# Patient Record
Sex: MALE | Race: BLACK OR AFRICAN AMERICAN | Employment: OTHER | URBAN - METROPOLITAN AREA
[De-identification: names, ages, dates, MRNs, and addresses within clinical notes are randomized per-mention and may not be internally consistent; named-entity substitution may affect disease eponyms.]

---

## 2018-12-20 ENCOUNTER — HOSPITAL ENCOUNTER (OUTPATIENT)
Dept: MRI IMAGING | Age: 63
Discharge: HOME | End: 2018-12-20
Attending: PHYSICAL MEDICINE & REHABILITATION

## 2018-12-20 ENCOUNTER — HOSPITAL ENCOUNTER (OUTPATIENT)
Dept: MRI IMAGING | Age: 63
Discharge: HOME | End: 2018-12-20
Attending: PHYSICAL MEDICINE & REHABILITATION
Payer: MEDICARE

## 2018-12-20 DIAGNOSIS — M54.2 CERVICALGIA: ICD-10-CM

## 2018-12-20 DIAGNOSIS — R20.1 HYPOESTHESIA OF SKIN: ICD-10-CM

## 2018-12-20 DIAGNOSIS — M47.817 LUMBOSACRAL SPONDYLOSIS WITHOUT MYELOPATHY: ICD-10-CM

## 2018-12-20 DIAGNOSIS — M47.812 CERVICAL SPONDYLOSIS WITHOUT MYELOPATHY: ICD-10-CM

## 2018-12-20 DIAGNOSIS — M54.32 LEFT SIDED SCIATICA: ICD-10-CM

## 2018-12-20 PROCEDURE — 72148 MRI LUMBAR SPINE W/O DYE: CPT

## 2019-01-07 ENCOUNTER — HOSPITAL ENCOUNTER (OUTPATIENT)
Dept: MRI IMAGING | Age: 64
Discharge: HOME | End: 2019-01-07
Attending: PHYSICAL MEDICINE & REHABILITATION
Payer: MEDICARE

## 2019-01-07 PROCEDURE — 72141 MRI NECK SPINE W/O DYE: CPT

## 2019-03-16 ENCOUNTER — APPOINTMENT (OUTPATIENT)
Dept: CT IMAGING | Age: 64
End: 2019-03-16
Attending: NURSE PRACTITIONER
Payer: MEDICARE

## 2019-03-16 ENCOUNTER — APPOINTMENT (OUTPATIENT)
Dept: GENERAL RADIOLOGY | Age: 64
End: 2019-03-16
Attending: NURSE PRACTITIONER
Payer: MEDICARE

## 2019-03-16 ENCOUNTER — HOSPITAL ENCOUNTER (EMERGENCY)
Age: 64
Discharge: HOME | End: 2019-03-16
Attending: EMERGENCY MEDICINE
Payer: MEDICARE

## 2019-03-16 VITALS
DIASTOLIC BLOOD PRESSURE: 82 MMHG | HEIGHT: 74 IN | BODY MASS INDEX: 33.11 KG/M2 | WEIGHT: 258 LBS | SYSTOLIC BLOOD PRESSURE: 121 MMHG | RESPIRATION RATE: 18 BRPM | TEMPERATURE: 97.2 F | HEART RATE: 57 BPM
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
## 2019-03-16 DIAGNOSIS — S33.5XXA LUMBAR SPRAIN, INITIAL ENCOUNTER: ICD-10-CM

## 2019-03-16 DIAGNOSIS — S13.9XXA NECK SPRAIN, INITIAL ENCOUNTER: ICD-10-CM

## 2019-03-16 PROCEDURE — 70450 CT HEAD/BRAIN W/O DYE: CPT

## 2019-03-16 PROCEDURE — 99283 EMERGENCY DEPT VISIT LOW MDM: CPT

## 2019-03-16 PROCEDURE — 72050 X-RAY EXAM NECK SPINE 4/5VWS: CPT

## 2019-03-16 RX ORDER — METHOCARBAMOL 500 MG/1
500 TABLET, FILM COATED ORAL 4 TIMES DAILY
Qty: 30 TAB | Refills: 0 | OUTPATIENT
Start: 2019-03-16 | End: 2020-04-26

## 2019-03-16 RX ORDER — DEXTROMETHORPHAN HYDROBROMIDE, GUAIFENESIN 5; 100 MG/5ML; MG/5ML
650 LIQUID ORAL EVERY 8 HOURS
Qty: 20 TAB | Refills: 0 | Status: SHIPPED | OUTPATIENT
Start: 2019-03-16
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
## 2019-03-16 NOTE — DISCHARGE INSTRUCTIONS
Patient Education        Back Care and Preventing Injuries: Care Instructions  Your Care Instructions    You can hurt your back doing many everyday activities: lifting a heavy box, bending down to garden, exercising at the gym, and even getting out of bed. But you can keep your back strong and healthy by doing some exercises. You also can follow a few tips for sitting, sleeping, and lifting to avoid hurting your back again. Talk to your doctor before you start an exercise program. Ask for help if you want to learn more about keeping your back healthy. Follow-up care is a key part of your treatment and safety. Be sure to make and go to all appointments, and call your doctor if you are having problems. It's also a good idea to know your test results and keep a list of the medicines you take. How can you care for yourself at home? · Stay at a healthy weight to avoid strain on your lower back. · Do not smoke. Smoking increases the risk of osteoporosis, which weakens the spine. If you need help quitting, talk to your doctor about stop-smoking programs and medicines. These can increase your chances of quitting for good. · Make sure you sleep in a position that maintains your back's normal curves and on a mattress that feels comfortable. Sleep on your side with a pillow between your knees, or sleep on your back with a pillow under your knees. These positions can reduce strain on your back. · When you get out of bed, lie on your side and bend both knees. Drop your feet over the edge of the bed as you push up with both arms. Scoot to the edge of the bed. Make sure your feet are in line with your rear end (buttocks), and then stand up. · If you must stand for a long time, put one foot on a stool, ledge, or box. Exercise to strengthen your back and other muscles  · Get at least 30 minutes of exercise on most days of the week. Walking is a good choice.  You also may want to do other activities, such as running, swimming, cycling, or playing tennis or team sports. · Stretch your back muscles. Here are few exercises to try:  ? Lie on your back with your knees bent and your feet flat on the floor. Gently pull one bent knee to your chest. Put that foot back on the floor, and then pull the other knee to your chest. Hold for 15 to 30 seconds. Repeat 2 to 4 times. ? Do pelvic tilts. Lie on your back with your knees bent. Tighten your stomach muscles. Pull your belly button (navel) in and up toward your ribs. You should feel like your back is pressing to the floor and your hips and pelvis are slightly lifting off the floor. Hold for 6 seconds while breathing smoothly. · Keep your core muscles strong. The muscles of your back, belly (abdomen), and buttocks support your spine. ? Pull in your belly, and imagine pulling your navel toward your spine. Hold this for 6 seconds, then relax. Remember to keep breathing normally as you tense your muscles. ? Do curl-ups. Always do them with your knees bent. Keep your low back on the floor, and curl your shoulders toward your knees using a smooth, slow motion. Keep your arms folded across your chest. If this bothers your neck, try putting your hands behind your neck (not your head), with your elbows spread apart. ? Lie on your back with your knees bent and your feet flat on the floor. Tighten your belly muscles, and then push with your feet and raise your buttocks up a few inches. Hold this position 6 seconds as you continue to breathe normally, then lower yourself slowly to the floor. Repeat 8 to 12 times. ? If you like group exercise, try Pilates or yoga. These classes have poses that strengthen the core muscles. Protect your back when you sit  · Place a small pillow, a rolled-up towel, or a lumbar roll in the curve of your back if you need extra support. · Sit in a chair that is low enough to let you place both feet flat on the floor with both knees nearly level with your hips.  If your chair or desk is too high, use a foot rest to raise your knees. · When driving, keep your knees nearly level with your hips. Sit straight, and drive with both hands on the steering wheel. Your arms should be in a slightly bent position. · Try a kneeling chair, which helps tilt your hips forward. This takes pressure off your lower back. · Try sitting on an exercise ball. It can rock from side to side, which helps keep your back loose. Lift properly  · Squat down, bending at the hips and knees only. If you need to, put one knee to the floor and extend your other knee in front of you, bent at a right angle (half kneeling). · Press your chest straight forward. This helps keep your upper back straight while keeping a slight arch in your low back. · Hold the load as close to your body as possible, at the level of your navel. · Use your feet to change direction, taking small steps. · Lead with your hips as you change direction. Keep your shoulders in line with your hips as you move. Do not twist your body. · Set down your load carefully, squatting with your knees and hips only. When should you call for help? Watch closely for changes in your health, and be sure to contact your doctor if you have any problems. Where can you learn more? Go to http://aden-jhoana.info/. Enter S810 in the search box to learn more about \"Back Care and Preventing Injuries: Care Instructions. \"  Current as of: September 20, 2018  Content Version: 11.9  © 0906-1856 [employer]. Care instructions adapted under license by [employer] (which disclaims liability or warranty for this information). If you have questions about a medical condition or this instruction, always ask your healthcare professional. [place] [address] any warranty or liability for your use of this information.          Patient Education        Neck Strain: Care Instructions  Your Care Instructions    You have strained the muscles and ligaments in your neck. A sudden, awkward movement can strain the neck. This often occurs with falls or car accidents or during certain sports. Everyday activities like working on a computer or sleeping can also cause neck strain if they force you to hold your neck in an awkward position for a long time. It is common for neck pain to get worse for a day or two after an injury, but it should start to feel better after that. You may have more pain and stiffness for several days before it gets better. This is expected. It may take a few weeks or longer for it to heal completely. Good home treatment can help you get better faster and avoid future neck problems. Follow-up care is a key part of your treatment and safety. Be sure to make and go to all appointments, and call your doctor if you are having problems. It's also a good idea to know your test results and keep a list of the medicines you take. How can you care for yourself at home? · If you were given a neck brace (cervical collar) to limit neck motion, wear it as instructed for as many days as your doctor tells you to. Do not wear it longer than you were told to. Wearing a brace for too long can make neck stiffness worse and weaken the neck muscles. · You can try using heat or ice to see if it helps. ? Try using a heating pad on a low or medium setting for 15 to 20 minutes every 2 to 3 hours. Try a warm shower in place of one session with the heating pad. You can also buy single-use heat wraps that last up to 8 hours. ? You can also try an ice pack for 10 to 15 minutes every 2 to 3 hours. · Take pain medicines exactly as directed. ? If the doctor gave you a prescription medicine for pain, take it as prescribed. ? If you are not taking a prescription pain medicine, ask your doctor if you can take an over-the-counter medicine. · Gently rub the area to relieve pain and help with blood flow.  Do not massage the area if it hurts to do so. · Do not do anything that makes the pain worse. Take it easy for a couple of days. You can do your usual activities if they do not hurt your neck or put it at risk for more stress or injury. · Try sleeping on a special neck pillow. Place it under your neck, not under your head. Placing a tightly rolled-up towel under your neck while you sleep will also work. If you use a neck pillow or rolled towel, do not use your regular pillow at the same time. · To prevent future neck pain, do exercises to stretch and strengthen your neck and back. Learn how to use good posture, safe lifting techniques, and proper body mechanics. When should you call for help? Call 911 anytime you think you may need emergency care. For example, call if:    · You are unable to move an arm or a leg at all.   [facility] your doctor now or seek immediate medical care if:    · You have new or worse symptoms in your arms, legs, chest, belly, or buttocks. Symptoms may include:  ? Numbness or tingling. ? Weakness. ? Pain.     · You lose bladder or bowel control.    Watch closely for changes in your health, and be sure to contact your doctor if:    · You are not getting better as expected. Where can you learn more? Go to http://aden-jhoana.info/. Enter M253 in the search box to learn more about \"Neck Strain: Care Instructions. \"  Current as of: September 20, 2018  Content Version: 11.9  © 4610-8222 [employer]. Care instructions adapted under license by [employer] (which disclaims liability or warranty for this information). If you have questions about a medical condition or this instruction, always ask your healthcare professional. [place] [address] any warranty or liability for your use of this information.
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
## 2019-04-17 ENCOUNTER — OFFICE VISIT (OUTPATIENT)
Dept: NEUROLOGY | Age: 64
End: 2019-04-17

## 2019-04-17 VITALS
RESPIRATION RATE: 16 BRPM | BODY MASS INDEX: 33.24 KG/M2 | WEIGHT: 259 LBS | HEART RATE: 60 BPM | TEMPERATURE: 98.4 F | DIASTOLIC BLOOD PRESSURE: 84 MMHG | OXYGEN SATURATION: 97 % | HEIGHT: 74 IN | SYSTOLIC BLOOD PRESSURE: 125 MMHG

## 2019-04-17 DIAGNOSIS — G62.9 NEUROPATHY: ICD-10-CM

## 2019-04-17 DIAGNOSIS — M79.18 MYOFASCIAL MUSCLE PAIN: ICD-10-CM

## 2019-04-17 DIAGNOSIS — G44.311 INTRACTABLE ACUTE POST-TRAUMATIC HEADACHE: ICD-10-CM

## 2019-04-17 DIAGNOSIS — M54.2 NECK PAIN: ICD-10-CM

## 2019-04-17 DIAGNOSIS — F07.81 POST CONCUSSION SYNDROME: Primary | ICD-10-CM
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
## 2019-04-17 DIAGNOSIS — R53.83 OTHER FATIGUE: ICD-10-CM

## 2019-04-17 DIAGNOSIS — G56.01 CARPAL TUNNEL SYNDROME OF RIGHT WRIST: ICD-10-CM

## 2019-04-17 RX ORDER — ERGOCALCIFEROL 1.25 MG/1
50000 CAPSULE ORAL
COMMUNITY

## 2019-04-17 RX ORDER — GABAPENTIN 100 MG/1
100 CAPSULE ORAL 3 TIMES DAILY
Qty: 90 CAP | Refills: 1 | Status: SHIPPED | OUTPATIENT
Start: 2019-04-17 | End: 2019-06-14 | Stop reason: DRUGHIGH

## 2019-04-17 RX ORDER — HYDROCHLOROTHIAZIDE 12.5 MG/1
12.5 TABLET ORAL DAILY
COMMUNITY

## 2019-04-17 RX ORDER — PREDNISONE 10 MG/1
10 TABLET ORAL 2 TIMES DAILY
Qty: 20 TAB | Refills: 0 | Status: SHIPPED | OUTPATIENT
Start: 2019-04-17 | End: 2019-06-14 | Stop reason: SDUPTHER

## 2019-04-17 RX ORDER — ACETAMINOPHEN AND CODEINE PHOSPHATE 300; 30 MG/1; MG/1
TABLET ORAL
COMMUNITY
Start: 2019-03-20

## 2019-04-17 RX ORDER — AMLODIPINE BESYLATE 5 MG/1
TABLET ORAL DAILY
Refills: 3 | COMMUNITY
Start: 2019-04-12
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
## 2019-04-26 ENCOUNTER — HOSPITAL ENCOUNTER (OUTPATIENT)
Dept: CT IMAGING | Age: 64
Discharge: HOME | End: 2019-04-26
Attending: PSYCHIATRY & NEUROLOGY
Payer: MEDICARE

## 2019-04-26 DIAGNOSIS — M79.18 MYOFASCIAL MUSCLE PAIN: ICD-10-CM

## 2019-04-26 DIAGNOSIS — M54.2 NECK PAIN: ICD-10-CM

## 2019-04-26 PROCEDURE — 72125 CT NECK SPINE W/O DYE: CPT

## 2019-06-05 ENCOUNTER — HOSPITAL ENCOUNTER (OUTPATIENT)
Dept: PHYSICAL THERAPY | Age: 64
Discharge: HOME | End: 2019-06-05
Payer: MEDICARE

## 2019-06-05 PROCEDURE — 97161 PT EVAL LOW COMPLEX 20 MIN: CPT

## 2019-06-05 PROCEDURE — 97110 THERAPEUTIC EXERCISES: CPT
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
## 2019-06-10 ENCOUNTER — HOSPITAL ENCOUNTER (OUTPATIENT)
Dept: PHYSICAL THERAPY | Age: 64
Discharge: HOME | End: 2019-06-10
Payer: MEDICARE

## 2019-06-10 PROCEDURE — 97110 THERAPEUTIC EXERCISES: CPT

## 2019-06-10 NOTE — PROGRESS NOTES
[facility]  [address], [address] [facility]    OUTPATIENT physical Therapy DAILY Treatment NOTe  Visit: 2    NAME: Cady Guadarrama AGE: 75 y.o. GENDER: male  DATE: 6/10/2019  REFERRING PHYSICIAN: Ella Cintron MD        GOALS  Short term goals  Time frame: 4  1. Patient will be compliant and independent with the initial HEP as evidenced by being able to perform without cuing. 2. Patient will report a 40% improvement in symptoms. 3. Patient will have an increased tolerance for standing to allow 10 minutes of the activity before symptoms start. 6. Patient will tolerate 30 minutes of clinic activities before onset of symptoms. Long term goals  Time frame: 8  1. Patient will report pain level decrease to 3/10 to allow increased ease of movement. 2. Patient will have an improved score on the Asia Reyna outcome measure by 5 points to demonstrate an increase in functional activity tolerance. 3. Patient will be independent in final individualized HEP. 4. Patient will return to bike riding >5 miles without being limited by symptoms. SUBJECTIVE:   Neck Pain, feels stiff and with movement hurts 5/10  Cervical extension increases pain, at base of skull. Low back pain increases with sitting, walking 6/10, lying down doesn't really help  L>right  No report of pain in hips or LE's  Pain In:     OBJECTIVE DATA SUMMARY:     Pain:  Location: Lumbar spine L4-5, base of C spine to mid thoracic  Quality: aching and tingling  Now: 8/10  Best:  Worst:  Factors that improve pain: ice, TENs unit,    Posture:    Increased thoracic kyphosis    Strength:   Grossly WNL except left bicep 4/5  Patient demonstrated double knee lift in supine with LEs moving in a circular pattern and no change in pain reported    Range of Motion:   Cervical Spine (AROM)  Flexion  Chin to chest  Extension 32  R side bend   L side bend   R rotation 54  L rotation 42    Lumbar Spine (AROM)  (*Measured 3rd finger from the floor)  Flexion* Fingers to floor  Extension 50% available  R side bend* 62  L side bend* 58  R rotation 75% available  L rotation 74% available      Spinal Assessment:   Cervical PA glides WNL, patient indicated pain with Gr 1 glides  Increased thoracic kyphosis      Joint Mobility:   Cavitation with right lumbar rotation      Palpation:   TTP bilateral cervical paraspinals, right UT > left  Increased turgor right UT and periscap compared to left    Neurologic Assessment:   Tone: normal   Sensation: Patient indicated diminished right hand 1, 2nd, 3rd fingers   Reflexes: NT    Special Tests:   - SLR  - Spurling        Functional Measure:   NDI: 15/50  Gabo Marco Antonio: 16/24     Physical Therapy Evaluation Charge Determination   History Examination Presentation Decision-Making   MEDIUM  Complexity : 1-2 comorbidities / personal factors will impact the outcome/ POC  MEDIUM Complexity : 3 Standardized tests and measures addressing body structure, function, activity limitation and / or participation in recreation  LOW Complexity : Stable, uncomplicated  LOW Complexity : FOTO score of       Based on the above components, the patient evaluation is determined to be of the following complexity level: LOW     TREATMENT/INTERVENTION:  Modalities (Rationale): MHP low back and neck post treatment to decrease pain and muscle gurading      Therapeutic Exercises:  Initial HEP: LTR, SKTC, cervical retraction, lower cervical/upper thoracic stretch    Clinic Activities    Supine  Cervical ROM with towel support  Flex/ext  Rotation  Chin retraction with Cx support 7 reps with 10 sec hold  LTR x 10 reps  SKTC x 10 reps  PPT 7 reps with 10 sec hold      Seated  UT stretch 3 reps 20 sec hold B  Shoulder blade squeeze  Trunk SB stretch 10 reps B  Lower trunk rotation 10 reps, arms across chest  Trunk flexion with blue physioball 10 reps  HS stretch 3 reps b with 15 sec hold    UBE/LBE 5 min resistance of 4      Manual Therapy:  None today    Activity tolerance and post treatment pain report:   Good tolerance  Pain Out:     Education:  Education was provided to the patient on the following topics: Pt instructed in PPT and encouraged to hold pelvic tilt as he performs core exercises. .  [x]    No changes were made to the home exercise program.  []    The following changes were made to the home exercise program: none this date  Patient verbalized understanding of the topics presented. ASSESSMENT:   Pt returns following IE, he reports continued low back pain L>R and neck pain with occasional HA's. Advanced clinic activities as above with good tolerance. Pt performed some core exercises he does frequently, very aggressive with little low back support. Pt instructed in PPT and encouraged to use this position for core exercises. , and delay aggressive core exercises until pain free. Advance clinic activities with transition to  HEP and gym. Head forward and rounded shoulder posture noted,   Patients progression toward goals is as follows:  []     Improving appropriately and progressing toward goals  []     Improving slowly and progressing toward goals  []     Not making progress toward goals and plan of care will be adjusted    PLAN OF CARE:   Patient continues to benefit from skilled intervention to address the above impairments. [x]    Continue treatment per established plan of care. []     Recommend the following changes to the plan of care:     Recommendations/Intent for next treatment: IASTM Cx paraspinals, UT and levator. LE band exercises. Instruct in use of tennis ball for lumbar paraspinals and over PSIS.     Radha Solis PT   Time Calculation: 55 mins  Patient Time in clinic:   Start Time: 0930   Stop Time: 1025

## 2019-06-12 ENCOUNTER — HOSPITAL ENCOUNTER (OUTPATIENT)
Dept: PHYSICAL THERAPY | Age: 64
Discharge: HOME | End: 2019-06-12
Payer: MEDICARE

## 2019-06-12 NOTE — PROGRESS NOTES
[facility]  [address], [address] [facility]    OUTPATIENT physical Therapy      6/12/2019:  Luis Armando Santana was not seen on this date for physical therapy for the following reasons:    [x]     Patient called to cancel the visit for the following reasons: conflict  []     Patient missed the visit and did not call to cancel.     Ede Rothman, PT

## 2019-06-14 ENCOUNTER — OFFICE VISIT (OUTPATIENT)
Dept: NEUROLOGY | Age: 64
End: 2019-06-14

## 2019-06-14 VITALS
SYSTOLIC BLOOD PRESSURE: 142 MMHG | RESPIRATION RATE: 16 BRPM | BODY MASS INDEX: 35.12 KG/M2 | DIASTOLIC BLOOD PRESSURE: 88 MMHG | WEIGHT: 265 LBS | OXYGEN SATURATION: 98 % | TEMPERATURE: 98.3 F | HEART RATE: 80 BPM | HEIGHT: 73 IN

## 2019-06-14 DIAGNOSIS — F07.81 POSTCONCUSSION SYNDROME: Primary | ICD-10-CM

## 2019-06-14 DIAGNOSIS — G44.309 POST-TRAUMATIC HEADACHE, NOT INTRACTABLE, UNSPECIFIED CHRONICITY PATTERN: ICD-10-CM

## 2019-06-14 DIAGNOSIS — G62.9 NEUROPATHY: ICD-10-CM

## 2019-06-14 DIAGNOSIS — G44.311 INTRACTABLE ACUTE POST-TRAUMATIC HEADACHE: ICD-10-CM

## 2019-06-14 DIAGNOSIS — M54.12 CERVICAL RADICULOPATHY: ICD-10-CM

## 2019-06-14 DIAGNOSIS — E04.1 THYROID NODULE: ICD-10-CM

## 2019-06-14 DIAGNOSIS — M79.18 MYOFASCIAL MUSCLE PAIN: ICD-10-CM

## 2019-06-14 RX ORDER — TIZANIDINE 4 MG/1
4 TABLET ORAL
Qty: 30 TAB | Refills: 2 | Status: SHIPPED | OUTPATIENT
Start: 2019-06-14 | End: 2020-04-26 | Stop reason: CLARIF

## 2019-06-14 RX ORDER — PREDNISONE 10 MG/1
10 TABLET ORAL 2 TIMES DAILY
Qty: 20 TAB | Refills: 0 | Status: SHIPPED | OUTPATIENT
Start: 2019-06-14 | End: 2019-08-14 | Stop reason: SDUPTHER

## 2019-06-14 RX ORDER — GABAPENTIN 300 MG/1
300 CAPSULE ORAL 2 TIMES DAILY
Qty: 60 CAP | Refills: 3 | Status: SHIPPED | OUTPATIENT
Start: 2019-06-14 | End: 2021-04-14 | Stop reason: SDUPTHER
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
## 2019-06-20 ENCOUNTER — HOSPITAL ENCOUNTER (OUTPATIENT)
Dept: PHYSICAL THERAPY | Age: 64
Discharge: HOME | End: 2019-06-20
Payer: MEDICARE

## 2019-06-20 NOTE — PROGRESS NOTES
[facility]  [address], [address] [facility]    OUTPATIENT physical Therapy      6/20/2019:  Alberto Vaughn was not seen on this date for physical therapy for the following reasons:    [x]     Patient called to cancel the visit for the following reasons: awaiting insurance approval  []     Patient missed the visit and did not call to cancel.     Jorge Caldwell, PT

## 2019-07-03 NOTE — PROGRESS NOTES
[facility]  [address], [address] [facility]    OUTPATIENT physical Therapy discharge note      7/3/2019:  Patient will be discharged from physical therapy at this time. Criteria for termination of care:    []           Patient has plateaued  []           Patient has not returned to therapy  []           Patient has missed three or more visits without prior notification  [x]           Other: insurance coverage did not cover this PT clinic. Patient to set up appointment with [facility]. Patient was seen for 2 visits from 6/5/19 to 6/10/19. Please refer to the most recent progress note for the latest PT info available. If you need anything further faxed to you, please contact us at [phone].     Thank you for this referral.  Chrissie West, PT

## 2019-07-09 ENCOUNTER — HOSPITAL ENCOUNTER (OUTPATIENT)
Dept: ULTRASOUND IMAGING | Age: 64
Discharge: HOME | End: 2019-07-09
Attending: PSYCHIATRY & NEUROLOGY
Payer: MEDICARE

## 2019-07-09 DIAGNOSIS — E04.1 THYROID NODULE: ICD-10-CM

## 2019-07-09 PROCEDURE — 76536 US EXAM OF HEAD AND NECK: CPT

## 2019-07-10 ENCOUNTER — TELEPHONE (OUTPATIENT)
Dept: NEUROLOGY | Age: 64
End: 2019-07-10

## 2019-07-10 NOTE — TELEPHONE ENCOUNTER
Patient had an Ultrasound of the parathyroid on 7/9/19 inquiring if Dr. Celina Mullen received the results.

## 2019-07-18 ENCOUNTER — TELEPHONE (OUTPATIENT)
Dept: NEUROLOGY | Age: 64
End: 2019-07-18

## 2019-07-18 DIAGNOSIS — G44.301 INTRACTABLE POST-TRAUMATIC HEADACHE, UNSPECIFIED CHRONICITY PATTERN: ICD-10-CM

## 2019-07-18 DIAGNOSIS — F07.81 POSTCONCUSSION SYNDROME: ICD-10-CM

## 2019-07-18 DIAGNOSIS — F07.81 POSTTRAUMATIC BRAIN SYNDROME, NONPSYCHOTIC: Primary | ICD-10-CM

## 2019-07-18 NOTE — TELEPHONE ENCOUNTER
Tere sequeira from [employer] for therapist, Marcos No. She wants to know if she can get an order to treat him for concussion therapy.  Please fax to [phone]

## 2019-08-14 ENCOUNTER — HOSPITAL ENCOUNTER (OUTPATIENT)
Dept: GENERAL RADIOLOGY | Age: 64
Discharge: HOME | End: 2019-08-14
Payer: MEDICARE

## 2019-08-14 ENCOUNTER — OFFICE VISIT (OUTPATIENT)
Dept: NEUROLOGY | Age: 64
End: 2019-08-14

## 2019-08-14 VITALS
DIASTOLIC BLOOD PRESSURE: 85 MMHG | HEART RATE: 71 BPM | TEMPERATURE: 97.9 F | HEIGHT: 73 IN | OXYGEN SATURATION: 97 % | RESPIRATION RATE: 16 BRPM | WEIGHT: 266.4 LBS | SYSTOLIC BLOOD PRESSURE: 144 MMHG | BODY MASS INDEX: 35.31 KG/M2

## 2019-08-14 DIAGNOSIS — G31.84 MCI (MILD COGNITIVE IMPAIRMENT): ICD-10-CM

## 2019-08-14 DIAGNOSIS — R42 DIZZINESS: ICD-10-CM

## 2019-08-14 DIAGNOSIS — M54.12 CERVICAL RADICULOPATHY: ICD-10-CM

## 2019-08-14 DIAGNOSIS — E04.9 ENLARGEMENT OF THYROID: ICD-10-CM

## 2019-08-14 DIAGNOSIS — F07.81 POST CONCUSSION SYNDROME: Primary | ICD-10-CM

## 2019-08-14 DIAGNOSIS — G44.321 INTRACTABLE CHRONIC POST-TRAUMATIC HEADACHE: ICD-10-CM

## 2019-08-14 DIAGNOSIS — M79.18 MYOFASCIAL MUSCLE PAIN: ICD-10-CM

## 2019-08-14 DIAGNOSIS — M79.641 PAIN OF RIGHT HAND: ICD-10-CM

## 2019-08-14 DIAGNOSIS — G62.9 NEUROPATHY: ICD-10-CM

## 2019-08-14 PROBLEM — E66.01 SEVERE OBESITY (HCC): Status: ACTIVE | Noted: 2019-08-14

## 2019-08-14 PROCEDURE — 73130 X-RAY EXAM OF HAND: CPT

## 2019-08-14 RX ORDER — PREDNISONE 10 MG/1
10 TABLET ORAL 2 TIMES DAILY
Qty: 20 TAB | Refills: 0 | OUTPATIENT
Start: 2019-08-14 | End: 2022-02-20
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
## 2019-08-15 ENCOUNTER — TELEPHONE (OUTPATIENT)
Dept: NEUROLOGY | Age: 64
End: 2019-08-15

## 2019-08-15 DIAGNOSIS — G31.84 MCI (MILD COGNITIVE IMPAIRMENT): ICD-10-CM

## 2019-08-15 DIAGNOSIS — F07.81 POSTCONCUSSION SYNDROME: Primary | ICD-10-CM

## 2019-08-15 DIAGNOSIS — R42 DIZZINESS: ICD-10-CM

## 2019-08-26 ENCOUNTER — TELEPHONE (OUTPATIENT)
Dept: NEUROLOGY | Age: 64
End: 2019-08-26

## 2019-08-26 DIAGNOSIS — F41.1 GAD (GENERALIZED ANXIETY DISORDER): Primary | ICD-10-CM

## 2019-08-26 RX ORDER — DIAZEPAM 10 MG/1
TABLET ORAL
Qty: 2 TAB | Refills: 0 | Status: SHIPPED | OUTPATIENT
Start: 2019-08-26 | End: 2019-10-07 | Stop reason: SDUPTHER

## 2019-08-26 NOTE — TELEPHONE ENCOUNTER
Patient calling for (valium) for MRI. Patient refuses MRI without medication. Please advise.     Best # [phone]

## 2019-08-27 ENCOUNTER — TELEPHONE (OUTPATIENT)
Dept: NEUROLOGY | Age: 64
End: 2019-08-27

## 2019-08-29 ENCOUNTER — TELEPHONE (OUTPATIENT)
Dept: NEUROLOGY | Age: 64
End: 2019-08-29

## 2019-08-29 ENCOUNTER — HOSPITAL ENCOUNTER (OUTPATIENT)
Dept: MRI IMAGING | Age: 64
Discharge: HOME | End: 2019-08-29
Attending: PSYCHIATRY & NEUROLOGY

## 2019-08-29 DIAGNOSIS — F07.81 POSTCONCUSSION SYNDROME: ICD-10-CM

## 2019-08-29 DIAGNOSIS — R42 DIZZINESS: ICD-10-CM

## 2019-08-29 DIAGNOSIS — G31.84 MCI (MILD COGNITIVE IMPAIRMENT): ICD-10-CM

## 2019-08-29 NOTE — TELEPHONE ENCOUNTER
Patient went for MRI this morning and says he could not complete it. He was severely claustrophobic even after taking the medicine,  Needs to have reordered under IV sedation or with anesthesia, however they do.

## 2019-10-07 DIAGNOSIS — F41.1 GAD (GENERALIZED ANXIETY DISORDER): ICD-10-CM

## 2019-10-07 RX ORDER — DIAZEPAM 10 MG/1
TABLET ORAL
Qty: 2 TAB | Refills: 0 | Status: SHIPPED | OUTPATIENT
Start: 2019-10-07

## 2019-10-07 NOTE — TELEPHONE ENCOUNTER
Patient called asking to speak to Dr. Jesusita Faustin, I told him I would have to leave a message for his nurse. Did not say what it was regarding.

## 2019-10-09 ENCOUNTER — TELEPHONE (OUTPATIENT)
Dept: NEUROLOGY | Age: 64
End: 2019-10-09

## 2019-10-23 ENCOUNTER — TELEPHONE (OUTPATIENT)
Dept: NEUROLOGY | Age: 64
End: 2019-10-23

## 2019-10-23 ENCOUNTER — HOSPITAL ENCOUNTER (OUTPATIENT)
Dept: MRI IMAGING | Age: 64
Discharge: HOME | End: 2019-10-23
Attending: PSYCHIATRY & NEUROLOGY
Payer: MEDICARE

## 2019-10-23 DIAGNOSIS — G31.84 MCI (MILD COGNITIVE IMPAIRMENT): ICD-10-CM

## 2019-10-23 DIAGNOSIS — F07.81 POSTCONCUSSION SYNDROME: ICD-10-CM

## 2019-10-23 DIAGNOSIS — R42 DIZZINESS: ICD-10-CM

## 2019-10-23 PROCEDURE — 70551 MRI BRAIN STEM W/O DYE: CPT

## 2019-10-23 NOTE — TELEPHONE ENCOUNTER
Patient had MRI this yesterday and calling to make an appointment,  I told him I had nothing till January which he was not happy with. Wants an appointment sooner.

## 2019-10-28 ENCOUNTER — OFFICE VISIT (OUTPATIENT)
Dept: NEUROLOGY | Age: 64
End: 2019-10-28

## 2019-10-28 VITALS
WEIGHT: 269.2 LBS | BODY MASS INDEX: 35.68 KG/M2 | TEMPERATURE: 98.5 F | HEART RATE: 89 BPM | DIASTOLIC BLOOD PRESSURE: 80 MMHG | OXYGEN SATURATION: 96 % | SYSTOLIC BLOOD PRESSURE: 148 MMHG | HEIGHT: 73 IN | RESPIRATION RATE: 18 BRPM

## 2019-10-28 DIAGNOSIS — G44.311 INTRACTABLE ACUTE POST-TRAUMATIC HEADACHE: Primary | ICD-10-CM

## 2019-10-28 DIAGNOSIS — F07.81 POST CONCUSSION SYNDROME: ICD-10-CM

## 2019-10-28 DIAGNOSIS — M47.812 ARTHROPATHY OF CERVICAL FACET JOINT: ICD-10-CM

## 2019-10-28 DIAGNOSIS — M79.18 MYOFASCIAL MUSCLE PAIN: ICD-10-CM

## 2019-10-28 DIAGNOSIS — G62.9 NEUROPATHY: ICD-10-CM
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
## 2019-11-21 ENCOUNTER — TELEPHONE (OUTPATIENT)
Dept: NEUROLOGY | Age: 64
End: 2019-11-21

## 2019-11-21 NOTE — TELEPHONE ENCOUNTER
Patient states Dr Chacon Client wanted him to have some thyroid test and when he called dr Steve Villarreal office they told him if this was something you wanted then you would have  to order.   I told him this message would be waiting until you return as [place]

## 2019-12-12 ENCOUNTER — HOSPITAL ENCOUNTER (OUTPATIENT)
Dept: NUCLEAR MEDICINE | Age: 64
Discharge: HOME | End: 2019-12-12
Attending: INTERNAL MEDICINE
Payer: MEDICARE

## 2019-12-12 DIAGNOSIS — E04.1 THYROID NODULE: ICD-10-CM

## 2019-12-12 PROCEDURE — 78014 THYROID IMAGING W/BLOOD FLOW: CPT

## 2019-12-13 ENCOUNTER — HOSPITAL ENCOUNTER (OUTPATIENT)
Dept: NUCLEAR MEDICINE | Age: 64
Discharge: HOME | End: 2019-12-13
Attending: INTERNAL MEDICINE
Payer: MEDICARE

## 2020-04-26 ENCOUNTER — HOSPITAL ENCOUNTER (EMERGENCY)
Age: 65
Discharge: HOME | End: 2020-04-26
Attending: EMERGENCY MEDICINE
Payer: SELF-PAY

## 2020-04-26 ENCOUNTER — APPOINTMENT (OUTPATIENT)
Dept: GENERAL RADIOLOGY | Age: 65
End: 2020-04-26
Attending: EMERGENCY MEDICINE
Payer: SELF-PAY

## 2020-04-26 ENCOUNTER — APPOINTMENT (OUTPATIENT)
Dept: CT IMAGING | Age: 65
End: 2020-04-26
Attending: EMERGENCY MEDICINE
Payer: SELF-PAY

## 2020-04-26 VITALS
HEIGHT: 73 IN | BODY MASS INDEX: 32.37 KG/M2 | WEIGHT: 244.27 LBS | OXYGEN SATURATION: 96 % | DIASTOLIC BLOOD PRESSURE: 96 MMHG | HEART RATE: 68 BPM | RESPIRATION RATE: 18 BRPM | SYSTOLIC BLOOD PRESSURE: 165 MMHG | TEMPERATURE: 98.5 F

## 2020-04-26 DIAGNOSIS — E87.6 HYPOKALEMIA: ICD-10-CM

## 2020-04-26 DIAGNOSIS — K57.90 DIVERTICULOSIS: ICD-10-CM

## 2020-04-26 DIAGNOSIS — R10.12 ABDOMINAL PAIN, LUQ (LEFT UPPER QUADRANT): Primary | ICD-10-CM

## 2020-04-26 DIAGNOSIS — K29.00 OTHER ACUTE GASTRITIS WITHOUT HEMORRHAGE: ICD-10-CM

## 2020-04-26 DIAGNOSIS — R07.9 ACUTE CHEST PAIN: ICD-10-CM

## 2020-04-26 LAB
ALBUMIN SERPL-MCNC: 4 G/DL (ref 3.5–5)
ALBUMIN/GLOB SERPL: 1 {RATIO} (ref 1.1–2.2)
ALP SERPL-CCNC: 84 U/L (ref 45–117)
ALT SERPL-CCNC: 41 U/L (ref 12–78)
ANION GAP SERPL CALC-SCNC: 8 MMOL/L (ref 5–15)
APPEARANCE UR: CLEAR
AST SERPL-CCNC: 24 U/L (ref 15–37)
ATRIAL RATE: 68 BPM
BACTERIA URNS QL MICRO: ABNORMAL /HPF
BASOPHILS # BLD: 0.1 K/UL (ref 0–0.1)
BASOPHILS NFR BLD: 1 % (ref 0–1)
BILIRUB SERPL-MCNC: 1.3 MG/DL (ref 0.2–1)
BILIRUB UR QL: NEGATIVE
BUN SERPL-MCNC: 15 MG/DL (ref 6–20)
BUN/CREAT SERPL: 10 (ref 12–20)
CALCIUM SERPL-MCNC: 9.8 MG/DL (ref 8.5–10.1)
CALCULATED P AXIS, ECG09: 52 DEGREES
CALCULATED R AXIS, ECG10: -71 DEGREES
CALCULATED T AXIS, ECG11: 23 DEGREES
CHLORIDE SERPL-SCNC: 100 MMOL/L (ref 97–108)
CO2 SERPL-SCNC: 30 MMOL/L (ref 21–32)
COLOR UR: ABNORMAL
CREAT SERPL-MCNC: 1.55 MG/DL (ref 0.7–1.3)
DIAGNOSIS, 93000: NORMAL
DIFFERENTIAL METHOD BLD: ABNORMAL
EOSINOPHIL # BLD: 0 K/UL (ref 0–0.4)
EOSINOPHIL NFR BLD: 0 % (ref 0–7)
EPITH CASTS URNS QL MICRO: ABNORMAL /LPF
ERYTHROCYTE [DISTWIDTH] IN BLOOD BY AUTOMATED COUNT: 12.7 % (ref 11.5–14.5)
GLOBULIN SER CALC-MCNC: 4.2 G/DL (ref 2–4)
GLUCOSE SERPL-MCNC: 156 MG/DL (ref 65–100)
GLUCOSE UR STRIP.AUTO-MCNC: NEGATIVE MG/DL
HCT VFR BLD AUTO: 47.8 % (ref 36.6–50.3)
HGB BLD-MCNC: 17.1 G/DL (ref 12.1–17)
HGB UR QL STRIP: NEGATIVE
HYALINE CASTS URNS QL MICRO: ABNORMAL /LPF (ref 0–5)
IMM GRANULOCYTES # BLD AUTO: 0 K/UL (ref 0–0.04)
IMM GRANULOCYTES NFR BLD AUTO: 0 % (ref 0–0.5)
KETONES UR QL STRIP.AUTO: NEGATIVE MG/DL
LACTATE SERPL-SCNC: 2.3 MMOL/L (ref 0.4–2)
LEUKOCYTE ESTERASE UR QL STRIP.AUTO: NEGATIVE
LIPASE SERPL-CCNC: 61 U/L (ref 73–393)
LYMPHOCYTES # BLD: 0.8 K/UL (ref 0.8–3.5)
LYMPHOCYTES NFR BLD: 6 % (ref 12–49)
MAGNESIUM SERPL-MCNC: 2.1 MG/DL (ref 1.6–2.4)
MCH RBC QN AUTO: 30.8 PG (ref 26–34)
MCHC RBC AUTO-ENTMCNC: 35.8 G/DL (ref 30–36.5)
MCV RBC AUTO: 86 FL (ref 80–99)
MONOCYTES # BLD: 0.6 K/UL (ref 0–1)
MONOCYTES NFR BLD: 5 % (ref 5–13)
NEUTS SEG # BLD: 11.1 K/UL (ref 1.8–8)
NEUTS SEG NFR BLD: 88 % (ref 32–75)
NITRITE UR QL STRIP.AUTO: NEGATIVE
NRBC # BLD: 0 K/UL (ref 0–0.01)
NRBC BLD-RTO: 0 PER 100 WBC
P-R INTERVAL, ECG05: 142 MS
PH UR STRIP: 8 [PH] (ref 5–8)
PLATELET # BLD AUTO: 314 K/UL (ref 150–400)
PMV BLD AUTO: 9.9 FL (ref 8.9–12.9)
POTASSIUM SERPL-SCNC: 2.7 MMOL/L (ref 3.5–5.1)
PROT SERPL-MCNC: 8.2 G/DL (ref 6.4–8.2)
PROT UR STRIP-MCNC: ABNORMAL MG/DL
Q-T INTERVAL, ECG07: 428 MS
QRS DURATION, ECG06: 98 MS
QTC CALCULATION (BEZET), ECG08: 455 MS
RBC # BLD AUTO: 5.56 M/UL (ref 4.1–5.7)
RBC #/AREA URNS HPF: ABNORMAL /HPF (ref 0–5)
RBC MORPH BLD: ABNORMAL
SODIUM SERPL-SCNC: 138 MMOL/L (ref 136–145)
SP GR UR REFRACTOMETRY: <1.005 (ref 1–1.03)
TROPONIN I SERPL-MCNC: <0.05 NG/ML
UROBILINOGEN UR QL STRIP.AUTO: 1 EU/DL (ref 0.2–1)
VENTRICULAR RATE, ECG03: 68 BPM
WBC # BLD AUTO: 12.6 K/UL (ref 4.1–11.1)
WBC URNS QL MICRO: ABNORMAL /HPF (ref 0–4)

## 2020-04-26 PROCEDURE — 84484 ASSAY OF TROPONIN QUANT: CPT

## 2020-04-26 PROCEDURE — 83605 ASSAY OF LACTIC ACID: CPT

## 2020-04-26 PROCEDURE — 85025 COMPLETE CBC W/AUTO DIFF WBC: CPT

## 2020-04-26 PROCEDURE — 93005 ELECTROCARDIOGRAM TRACING: CPT

## 2020-04-26 PROCEDURE — 96365 THER/PROPH/DIAG IV INF INIT: CPT

## 2020-04-26 PROCEDURE — 80053 COMPREHEN METABOLIC PANEL: CPT

## 2020-04-26 PROCEDURE — 74177 CT ABD & PELVIS W/CONTRAST: CPT

## 2020-04-26 PROCEDURE — 74011000250 HC RX REV CODE- 250: Performed by: EMERGENCY MEDICINE

## 2020-04-26 PROCEDURE — 71045 X-RAY EXAM CHEST 1 VIEW: CPT

## 2020-04-26 PROCEDURE — 83690 ASSAY OF LIPASE: CPT

## 2020-04-26 PROCEDURE — 74011250637 HC RX REV CODE- 250/637: Performed by: EMERGENCY MEDICINE

## 2020-04-26 PROCEDURE — 96375 TX/PRO/DX INJ NEW DRUG ADDON: CPT

## 2020-04-26 PROCEDURE — 96366 THER/PROPH/DIAG IV INF ADDON: CPT

## 2020-04-26 PROCEDURE — 74011250636 HC RX REV CODE- 250/636: Performed by: EMERGENCY MEDICINE

## 2020-04-26 PROCEDURE — 83735 ASSAY OF MAGNESIUM: CPT

## 2020-04-26 PROCEDURE — 99284 EMERGENCY DEPT VISIT MOD MDM: CPT

## 2020-04-26 PROCEDURE — 36415 COLL VENOUS BLD VENIPUNCTURE: CPT

## 2020-04-26 PROCEDURE — 74011636320 HC RX REV CODE- 636/320: Performed by: EMERGENCY MEDICINE

## 2020-04-26 PROCEDURE — 81001 URINALYSIS AUTO W/SCOPE: CPT

## 2020-04-26 RX ORDER — DICYCLOMINE HYDROCHLORIDE 10 MG/1
10 CAPSULE ORAL
Qty: 20 CAP | Refills: 0 | Status: SHIPPED | OUTPATIENT
Start: 2020-04-26 | End: 2020-05-01

## 2020-04-26 RX ORDER — METHOCARBAMOL 750 MG/1
750 TABLET, FILM COATED ORAL
Status: COMPLETED | OUTPATIENT
Start: 2020-04-26 | End: 2020-04-26

## 2020-04-26 RX ORDER — SODIUM CHLORIDE 0.9 % (FLUSH) 0.9 %
10 SYRINGE (ML) INJECTION
Status: COMPLETED | OUTPATIENT
Start: 2020-04-26 | End: 2020-04-26

## 2020-04-26 RX ORDER — ONDANSETRON 2 MG/ML
4 INJECTION INTRAMUSCULAR; INTRAVENOUS
Status: COMPLETED | OUTPATIENT
Start: 2020-04-26 | End: 2020-04-26

## 2020-04-26 RX ORDER — FAMOTIDINE 20 MG/1
20 TABLET, FILM COATED ORAL 2 TIMES DAILY
Qty: 20 TAB | Refills: 0 | OUTPATIENT
Start: 2020-04-26 | End: 2022-02-20

## 2020-04-26 RX ORDER — POTASSIUM CHLORIDE 7.45 MG/ML
10 INJECTION INTRAVENOUS
Status: COMPLETED | OUTPATIENT
Start: 2020-04-26 | End: 2020-04-26

## 2020-04-26 RX ORDER — METHOCARBAMOL 750 MG/1
750 TABLET, FILM COATED ORAL
Qty: 20 TAB | Refills: 0 | Status: SHIPPED | OUTPATIENT
Start: 2020-04-26 | End: 2021-04-14 | Stop reason: SDUPTHER

## 2020-04-26 RX ADMIN — ONDANSETRON 4 MG: 2 INJECTION INTRAMUSCULAR; INTRAVENOUS at 03:51

## 2020-04-26 RX ADMIN — Medication 10 ML: at 03:43

## 2020-04-26 RX ADMIN — LIDOCAINE HYDROCHLORIDE 40 ML: 20 SOLUTION ORAL; TOPICAL at 06:32

## 2020-04-26 RX ADMIN — POTASSIUM CHLORIDE 10 MEQ: 10 INJECTION, SOLUTION INTRAVENOUS at 05:27

## 2020-04-26 RX ADMIN — SODIUM CHLORIDE 1000 ML: 900 INJECTION, SOLUTION INTRAVENOUS at 05:27

## 2020-04-26 RX ADMIN — METHOCARBAMOL TABLETS 750 MG: 750 TABLET, COATED ORAL at 07:00

## 2020-04-26 RX ADMIN — IOPAMIDOL 100 ML: 755 INJECTION, SOLUTION INTRAVENOUS at 03:43

## 2020-04-26 RX ADMIN — FAMOTIDINE 20 MG: 10 INJECTION, SOLUTION INTRAVENOUS at 03:52

## 2020-04-26 NOTE — ED NOTES
Patient discharged by Micah Felty, MD. Patient provided with discharge instructions Rx and instructions on follow up care. Patient out of ED ambulatory accompanied by self.

## 2020-04-26 NOTE — ED TRIAGE NOTES
6354: Patient arrives to ED with complaints of LLQ pain since yesterday around 10:00 AM. Patient reports that he was using a push mower on Friday and believes that this pain may somehow be related. Also reports episodes of nausea/vomiting with his onset of pain. Patient arrives a/o x4. Placed on the monitor x2.

## 2020-04-26 NOTE — DISCHARGE INSTRUCTIONS
Patient Education        Abdominal Pain: Care Instructions  Your Care Instructions    Abdominal pain has many possible causes. Some aren't serious and get better on their own in a few days. Others need more testing and treatment. If your pain continues or gets worse, you need to be rechecked and may need more tests to find out what is wrong. You may need surgery to correct the problem. Don't ignore new symptoms, such as fever, nausea and vomiting, urination problems, pain that gets worse, and dizziness. These may be signs of a more serious problem. Your doctor may have recommended a follow-up visit in the next 8 to 12 hours. If you are not getting better, you may need more tests or treatment. The doctor has checked you carefully, but problems can develop later. If you notice any problems or new symptoms, get medical treatment right away. Follow-up care is a key part of your treatment and safety. Be sure to make and go to all appointments, and call your doctor if you are having problems. It's also a good idea to know your test results and keep a list of the medicines you take. How can you care for yourself at home? · Rest until you feel better. · To prevent dehydration, drink plenty of fluids, enough so that your urine is light yellow or clear like water. Choose water and other caffeine-free clear liquids until you feel better. If you have kidney, heart, or liver disease and have to limit fluids, talk with your doctor before you increase the amount of fluids you drink. · If your stomach is upset, eat mild foods, such as rice, dry toast or crackers, bananas, and applesauce. Try eating several small meals instead of two or three large ones. · Wait until 48 hours after all symptoms have gone away before you have spicy foods, alcohol, and drinks that contain caffeine. · Do not eat foods that are high in fat. · Avoid anti-inflammatory medicines such as aspirin, ibuprofen (Advil, Motrin), and naproxen (Aleve). These can cause stomach upset. Talk to your doctor if you take daily aspirin for another health problem. When should you call for help? Call 911 anytime you think you may need emergency care. For example, call if:    · You passed out (lost consciousness).     · You pass maroon or very bloody stools.     · You vomit blood or what looks like coffee grounds.     · You have new, severe belly pain.    Call your doctor now or seek immediate medical care if:    · Your pain gets worse, especially if it becomes focused in one area of your belly.     · You have a new or higher fever.     · Your stools are black and look like tar, or they have streaks of blood.     · You have unexpected vaginal bleeding.     · You have symptoms of a urinary tract infection. These may include:  ? Pain when you urinate. ? Urinating more often than usual.  ? Blood in your urine.     · You are dizzy or lightheaded, or you feel like you may faint.    Watch closely for changes in your health, and be sure to contact your doctor if:    · You are not getting better after 1 day (24 hours). Where can you learn more? Go to http://adenShopIgniterjhoana.info/  Enter Y439 in the search box to learn more about \"Abdominal Pain: Care Instructions. \"  Current as of: June 26, 2019Content Version: 12.4  © 1986-0498 [employer]. Care instructions adapted under license by [employer] (which disclaims liability or warranty for this information). If you have questions about a medical condition or this instruction, always ask your healthcare professional. [place] [address] any warranty or liability for your use of this information. Patient Education        Chest Pain: Care Instructions  Your Care Instructions    There are many things that can cause chest pain. Some are not serious and will get better on their own in a few days. But some kinds of chest pain need more testing and treatment.  Your doctor may have recommended a follow-up visit in the next 8 to 12 hours. If you are not getting better, you may need more tests or treatment. Even though your doctor has released you, you still need to watch for any problems. The doctor carefully checked you, but sometimes problems can develop later. If you have new symptoms or if your symptoms do not get better, get medical care right away. If you have worse or different chest pain or pressure that lasts more than 5 minutes or you passed out (lost consciousness), call 911 or seek other emergency help right away. A medical visit is only one step in your treatment. Even if you feel better, you still need to do what your doctor recommends, such as going to all suggested follow-up appointments and taking medicines exactly as directed. This will help you recover and help prevent future problems. How can you care for yourself at home? · Rest until you feel better. · Take your medicine exactly as prescribed. Call your doctor if you think you are having a problem with your medicine. · Do not drive after taking a prescription pain medicine. When should you call for help? Call 911 if:    · You passed out (lost consciousness).     · You have severe difficulty breathing.     · You have symptoms of a heart attack. These may include:  ? Chest pain or pressure, or a strange feeling in your chest.  ? Sweating. ? Shortness of breath. ? Nausea or vomiting. ? Pain, pressure, or a strange feeling in your back, neck, jaw, or upper belly or in one or both shoulders or arms. ? Lightheadedness or sudden weakness. ? A fast or irregular heartbeat. After you call  911, the  may tell you to chew 1 adult-strength or 2 to 4 low-dose aspirin. Wait for an ambulance.  Do not try to drive yourself.    Call your doctor today if:    · You have any trouble breathing.     · Your chest pain gets worse.     · You are dizzy or lightheaded, or you feel like you may faint.     · You are not getting better as expected.     · You are having new or different chest pain. Where can you learn more? Go to http://aden-jhoana.info/  Enter A120 in the search box to learn more about \"Chest Pain: Care Instructions. \"  Current as of: June 26, 2019Content Version: 12.4  © 4665-2370 [employer]. Care instructions adapted under license by [employer] (which disclaims liability or warranty for this information). If you have questions about a medical condition or this instruction, always ask your healthcare professional. [address] any warranty or liability for your use of this information. Patient Education        Diverticulosis: Care Instructions  Your Care Instructions  In diverticulosis, pouches called diverticula form in the wall of the large intestine (colon). The pouches do not cause any pain or other symptoms. Most people who have diverticulosis do not know they have it. But the pouches sometimes bleed, and if they become infected, they can cause pain and other symptoms. When this happens, it is called diverticulitis. Diverticula form when pressure pushes the wall of the colon outward at certain weak points. A diet that is too low in fiber can cause diverticula. Follow-up care is a key part of your treatment and safety. Be sure to make and go to all appointments, and call your doctor if you are having problems. It's also a good idea to know your test results and keep a list of the medicines you take. How can you care for yourself at home? · Include fruits, leafy green vegetables, beans, and whole grains in your diet each day. These foods are high in fiber. · Take a fiber supplement, such as Citrucel or Metamucil, every day if needed. Read and follow all instructions on the label. · Drink plenty of fluids, enough so that your urine is light yellow or clear like water.  If you have kidney, heart, or liver disease and have to limit fluids, talk with your doctor before you increase the amount of fluids you drink. · Get at least 30 minutes of exercise on most days of the week. Walking is a good choice. You also may want to do other activities, such as running, swimming, cycling, or playing tennis or team sports. · Cut out foods that cause gas, pain, or other symptoms. When should you call for help? Call your doctor now or seek immediate medical care if:    · You have belly pain.     · You pass maroon or very bloody stools.     · You have a fever.     · You have nausea and vomiting.     · You have unusual changes in your bowel movements or abdominal swelling.     · You have burning pain when you urinate.     · You have abnormal vaginal discharge.     · You have shoulder pain.     · You have cramping pain that does not get better when you have a bowel movement or pass gas.     · You pass gas or stool from your urethra while urinating.    Watch closely for changes in your health, and be sure to contact your doctor if you have any problems. Where can you learn more? Go to http://aden-jhoana.info/  Enter [id number] in the search box to learn more about \"Diverticulosis: Care Instructions. \"  Current as of: August 11, 2019Content Version: 12.4  © 3280-5481 Healthwise, Incorporated. Care instructions adapted under license by [employer] (which disclaims liability or warranty for this information). If you have questions about a medical condition or this instruction, always ask your healthcare professional. [place] [address] any warranty or liability for your use of this information. Patient Education        Gastritis: Care Instructions  Your Care Instructions    Gastritis is a sore and upset stomach. It happens when something irritates the stomach lining. Many things can cause it. These include an infection such as the flu or something you ate or drank.  Medicines or a sore on the lining of the stomach (ulcer) also can cause it. Your belly may bloat and ache. You may belch, vomit, and feel sick to your stomach. You should be able to relieve the problem by taking medicine. And it may help to change your diet. If gastritis lasts, your doctor may prescribe medicine. Follow-up care is a key part of your treatment and safety. Be sure to make and go to all appointments, and call your doctor if you are having problems. It's also a good idea to know your test results and keep a list of the medicines you take. How can you care for yourself at home? · If your doctor prescribed antibiotics, take them as directed. Do not stop taking them just because you feel better. You need to take the full course of antibiotics. · Be safe with medicines. If your doctor prescribed medicine to decrease stomach acid, take it as directed. Call your doctor if you think you are having a problem with your medicine. · Do not take any other medicine, including over-the-counter pain relievers, without talking to your doctor first.  · If your doctor recommends over-the-counter medicine to reduce stomach acid, such as Pepcid AC, Prilosec, Tagamet HB, or Zantac 75, follow the directions on the label. · Drink plenty of fluids (enough so that your urine is light yellow or clear like water) to prevent dehydration. Choose water and other caffeine-free clear liquids. If you have kidney, heart, or liver disease and have to limit fluids, talk with your doctor before you increase the amount of fluids you drink. · Limit how much alcohol you drink. · Avoid coffee, tea, cola drinks, chocolate, and other foods with caffeine. They increase stomach acid. When should you call for help? Call 911 anytime you think you may need emergency care.  For example, call if:    · You vomit blood or what looks like coffee grounds.     · You pass maroon or very bloody stools.    Call your doctor now or seek immediate medical care if:    · You start breathing fast and have not produced urine in the last 8 hours.     · You cannot keep fluids down.    Watch closely for changes in your health, and be sure to contact your doctor if:    · You do not get better as expected. Where can you learn more? Go to http://aden-jhoana.info/  Enter Z536 in the search box to learn more about \"Gastritis: Care Instructions. \"  Current as of: August 11, 2019Content Version: 12.4  © 2238-1335 [employer]. Care instructions adapted under license by [employer] (which disclaims liability or warranty for this information). If you have questions about a medical condition or this instruction, always ask your healthcare professional. [address] any warranty or liability for your use of this information. Patient Education        Hypokalemia: Care Instructions  Your Care Instructions    Hypokalemia (say \"xq-[id number]-pas-ROCIO-janes-uh\") is a low level of potassium. The heart, muscles, kidneys, and nervous system all need potassium to work well. This problem has many different causes. Kidney problems, diet, and medicines like diuretics and laxatives can cause it. So can vomiting or diarrhea. In some cases, cancer is the cause. Your doctor may do tests to find the cause of your low potassium levels. You may need medicines to bring your potassium levels back to normal. You may also need regular blood tests to check your potassium. If you have very low potassium, you may need intravenous (IV) medicines. You also may need tests to check the electrical activity of your heart. Heart problems caused by low potassium levels can be very serious. Follow-up care is a key part of your treatment and safety. Be sure to make and go to all appointments, and call your doctor if you are having problems. It's also a good idea to know your test results and keep a list of the medicines you take. How can you care for yourself at home?   · If your doctor recommends it, eat foods that have a lot of potassium. These include fresh fruits, juices, and vegetables. They also include nuts, beans, and milk. · Be safe with medicines. If your doctor prescribes medicines or potassium supplements, take them exactly as directed. Call your doctor if you have any problems with your medicines. · Get your potassium levels tested as often as your doctor tells you. When should you call for help?    · You feel like your heart is missing beats. Heart problems caused by low potassium can cause death.     · You passed out (lost consciousness).     · You have a seizure.    Call your doctor now or seek immediate medical care if:    · You feel weak or unusually tired.     · You have severe arm or leg cramps.     · You have tingling or numbness.     · You feel sick to your stomach, or you vomit.     · You have belly cramps.     · You feel bloated or constipated.     · You have to urinate a lot.     · You feel very thirsty most of the time.     · You are dizzy or lightheaded, or you feel like you may faint.     · You feel depressed, or you lose touch with reality.    Watch closely for changes in your health, and be sure to contact your doctor if:    · You do not get better as expected. Where can you learn more? Go to http://aden-jhoana.info/  Enter G358 in the search box to learn more about \"Hypokalemia: Care Instructions. \"  Current as of: July 28, 2019Content Version: 12.4  © 8820-9519 Healthwise, Incorporated. Care instructions adapted under license by [employer] (which disclaims liability or warranty for this information). If you have questions about a medical condition or this instruction, always ask your healthcare professional. [address] any warranty or liability for your use of this information.

## 2020-04-26 NOTE — ED PROVIDER NOTES
EMERGENCY DEPARTMENT HISTORY AND PHYSICAL EXAM      Date: 4/26/2020  Patient Name: Zayda Spicer    History of Presenting Illness     Chief Complaint   Patient presents with    Abdominal Pain       History Provided By: Patient and EMS    HPI: Zayda Spicer, 77 y.o. male presents to the ED with cc of abdominal pain and chest pain. Patient's symptoms began yesterday at 10 AM.  He believes they were precipitated by using a lawnmower on the previous day and also eating chicken which had been in the freezer for 2 years. He states that his pain is intermittent and lasts for hours at a time. It is located primarily in the left upper quadrant. He denies any pain involving the back. He has had 6 episodes of emesis, where he states he produced bile. Denies diarrhea, but does state he has not had a bowel movement in over a day. He also denies dysuria, fever, cough or shortness of breath. He indicates that he has had heartburn off and on for the last 6 hours. That is located in the left chest and does not radiate. The pains are a 4 out of 10 in intensity. He denies leg pain, headache or dizziness. There are no other complaints, changes, or physical findings at this time. Smoking cessation note: The patient was encouraged to quit smoking. PCP: Dayana Chamorro MD    No current facility-administered medications on file prior to encounter. Current Outpatient Medications on File Prior to Encounter   Medication Sig Dispense Refill    gabapentin (NEURONTIN) 300 mg capsule Take 1 Cap by mouth two (2) times a day. 60 Cap 3    amLODIPine (NORVASC) 5 mg tablet daily. 3    hydroCHLOROthiazide (HYDRODIURIL) 12.5 mg tablet Take 12.5 mg by mouth daily.  diazePAM (VALIUM) 10 mg tablet Take 1 tablet 1 hour before the MRI procedure. 2 Tab 0    predniSONE (DELTASONE) 10 mg tablet Take 10 mg by mouth two (2) times a day. 20 Tab 0    acetaminophen-codeine (TYLENOL #3) 300-30 mg per tablet nightly.       ergocalciferol (VITAMIN D2) 50,000 unit capsule Take 50,000 Units by mouth every thirty (30) days.  methocarbamol (ROBAXIN) 500 mg tablet Take 1 Tab by mouth four (4) times daily. 30 Tab 0    acetaminophen (TYLENOL ARTHRITIS PAIN) 650 mg TbER Take 1 Tab by mouth every eight (8) hours. 20 Tab 0       Past History     Past Medical History:  Past Medical History:   Diagnosis Date    Frequent headaches     Hypertension     Muscle pain     Neuropathy     Visual disturbance        Past Surgical History:  Past Surgical History:   Procedure Laterality Date    HX HERNIA REPAIR      times 2       Family History:  Family History   Problem Relation Age of Onset    Stroke Mother     Heart Disease Father     Neuropathy Father        Social History:  Social History     Tobacco Use    Smoking status: Former Smoker    Smokeless tobacco: Never Used    Tobacco comment: quit 6 years ago   Substance Use Topics    Alcohol use: No    Drug use: Not on file       Allergies:  No Known Allergies      Review of Systems   Review of Systems   Constitutional: Negative for fever. HENT: Negative for congestion. Eyes: Negative. Respiratory: Negative for cough and shortness of breath. Cardiovascular: Positive for chest pain. Gastrointestinal: Positive for abdominal pain, constipation, nausea and vomiting. Negative for diarrhea. Endocrine: Negative for heat intolerance. Genitourinary: Negative. Musculoskeletal: Negative for back pain. Skin: Negative for rash. Allergic/Immunologic: Negative for immunocompromised state. Neurological: Negative for dizziness and headaches. Hematological: Does not bruise/bleed easily. Psychiatric/Behavioral: Negative. All other systems reviewed and are negative. Physical Exam   Physical Exam  Vitals signs and nursing note reviewed. Constitutional:       General: He is not in acute distress. Appearance: He is well-developed.    HENT:      Head: Normocephalic and atraumatic. Neck:      Musculoskeletal: Normal range of motion. Cardiovascular:      Rate and Rhythm: Normal rate and regular rhythm. Heart sounds: Normal heart sounds. Pulmonary:      Effort: Pulmonary effort is normal.      Breath sounds: Normal breath sounds. Abdominal:      General: Bowel sounds are normal.      Palpations: Abdomen is soft. Tenderness: There is abdominal tenderness in the left upper quadrant. Skin:     General: Skin is warm and dry. Neurological:      General: No focal deficit present. Mental Status: He is alert and oriented to person, place, and time. Coordination: Coordination normal.   Psychiatric:         Mood and Affect: Mood normal.         Behavior: Behavior normal.         Diagnostic Study Results     Labs -     Recent Results (from the past 12 hour(s))   EKG, 12 LEAD, INITIAL    Collection Time: 04/26/20  3:35 AM   Result Value Ref Range    Ventricular Rate 68 BPM    Atrial Rate 68 BPM    P-R Interval 142 ms    QRS Duration 98 ms    Q-T Interval 428 ms    QTC Calculation (Bezet) 455 ms    Calculated P Axis 52 degrees    Calculated R Axis -71 degrees    Calculated T Axis 23 degrees    Diagnosis       Normal sinus rhythm  Left axis deviation  Nonspecific T wave abnormality  No previous ECGs available     CBC WITH AUTOMATED DIFF    Collection Time: 04/26/20  3:46 AM   Result Value Ref Range    WBC 12.6 (H) 4.1 - 11.1 K/uL    RBC 5.56 4.10 - 5.70 M/uL    HGB 17.1 (H) 12.1 - 17.0 g/dL    HCT 47.8 36.6 - 50.3 %    MCV 86.0 80.0 - 99.0 FL    MCH 30.8 26.0 - 34.0 PG    MCHC 35.8 30.0 - 36.5 g/dL    RDW 12.7 11.5 - 14.5 %    PLATELET 946 591 - 891 K/uL    MPV 9.9 8.9 - 12.9 FL    NRBC 0.0 0  WBC    ABSOLUTE NRBC 0.00 0.00 - 0.01 K/uL    NEUTROPHILS 88 (H) 32 - 75 %    LYMPHOCYTES 6 (L) 12 - 49 %    MONOCYTES 5 5 - 13 %    EOSINOPHILS 0 0 - 7 %    BASOPHILS 1 0 - 1 %    IMMATURE GRANULOCYTES 0 0.0 - 0.5 %    ABS. NEUTROPHILS 11.1 (H) 1.8 - 8.0 K/UL    ABS. LYMPHOCYTES 0.8 0.8 - 3.5 K/UL    ABS. MONOCYTES 0.6 0.0 - 1.0 K/UL    ABS. EOSINOPHILS 0.0 0.0 - 0.4 K/UL    ABS. BASOPHILS 0.1 0.0 - 0.1 K/UL    ABS. IMM. GRANS. 0.0 0.00 - 0.04 K/UL    DF AUTOMATED      RBC COMMENTS NORMOCYTIC, NORMOCHROMIC     METABOLIC PANEL, COMPREHENSIVE    Collection Time: 04/26/20  3:46 AM   Result Value Ref Range    Sodium 138 136 - 145 mmol/L    Potassium 2.7 (LL) 3.5 - 5.1 mmol/L    Chloride 100 97 - 108 mmol/L    CO2 30 21 - 32 mmol/L    Anion gap 8 5 - 15 mmol/L    Glucose 156 (H) 65 - 100 mg/dL    BUN 15 6 - 20 MG/DL    Creatinine 1.55 (H) 0.70 - 1.30 MG/DL    BUN/Creatinine ratio 10 (L) 12 - 20      GFR est AA 54 (L) >60 ml/min/1.73m2    GFR est non-AA 45 (L) >60 ml/min/1.73m2    Calcium 9.8 8.5 - 10.1 MG/DL    Bilirubin, total 1.3 (H) 0.2 - 1.0 MG/DL    ALT (SGPT) 41 12 - 78 U/L    AST (SGOT) 24 15 - 37 U/L    Alk.  phosphatase 84 45 - 117 U/L    Protein, total 8.2 6.4 - 8.2 g/dL    Albumin 4.0 3.5 - 5.0 g/dL    Globulin 4.2 (H) 2.0 - 4.0 g/dL    A-G Ratio 1.0 (L) 1.1 - 2.2     LIPASE    Collection Time: 04/26/20  3:46 AM   Result Value Ref Range    Lipase 61 (L) 73 - 393 U/L   TROPONIN I    Collection Time: 04/26/20  3:46 AM   Result Value Ref Range    Troponin-I, Qt. <0.05 <0.05 ng/mL   LACTIC ACID    Collection Time: 04/26/20  3:46 AM   Result Value Ref Range    Lactic acid 2.3 (HH) 0.4 - 2.0 MMOL/L   MAGNESIUM    Collection Time: 04/26/20  3:46 AM   Result Value Ref Range    Magnesium 2.1 1.6 - 2.4 mg/dL   URINALYSIS W/ RFLX MICROSCOPIC    Collection Time: 04/26/20  6:42 AM   Result Value Ref Range    Color YELLOW/STRAW      Appearance CLEAR CLEAR      Specific gravity <1.005 1.003 - 1.030    pH (UA) 8.0 5.0 - 8.0      Protein TRACE (A) NEG mg/dL    Glucose Negative NEG mg/dL    Ketone Negative NEG mg/dL    Bilirubin Negative NEG      Blood Negative NEG      Urobilinogen 1.0 0.2 - 1.0 EU/dL    Nitrites Negative NEG      Leukocyte Esterase Negative NEG      WBC 0-4 0 - 4 /hpf RBC 0-5 0 - 5 /hpf    Epithelial cells FEW FEW /lpf    Bacteria 2+ (A) NEG /hpf    Hyaline cast 0-2 0 - 5 /lpf       Radiologic Studies -   CT ABD PELV W CONT   Final Result   IMPRESSION:      1. Mural thickening of the gastric antrum due to incomplete distention,   gastritis, or peptic ulcer disease. 2. Minimal colonic diverticulosis. No diverticulitis. 3. Probable hepatic steatosis. XR CHEST PORT   Final Result   IMPRESSION:      No acute process on portable chest.           CT Results  (Last 48 hours)    None        CXR Results  (Last 48 hours)    None          Medical Decision Making   I am the first provider for this patient. I reviewed the vital signs, available nursing notes, past medical history, past surgical history, family history and social history. Vital Signs-Reviewed the patient's vital signs. Patient Vitals for the past 12 hrs:   Temp Pulse Resp BP SpO2   04/26/20 0304 98.5 °F (36.9 °C) 68 18 143/77 100 %       EKG interpretation: (Preliminary)  Rhythm: normal sinus rhythm; and regular . Rate (approx.): 68; Axis: left axis deviation; AR interval: normal; QRS interval: normal ; ST/T wave: non-specific changes; Other findings: No previous EKG. Records Reviewed: Nursing Notes, Old Medical Records, Ambulance Run Sheet and Previous Radiology Studies    Provider Notes (Medical Decision Making):   Pancreatitis, diverticulitis, peptic ulcer disease, reflux, gastritis, CAD, kidney stone, UTI, musculoskeletal pain    ED Course:   Initial assessment performed. The patients presenting problems have been discussed, and they are in agreement with the care plan formulated and outlined with them. I have encouraged them to ask questions as they arise throughout their visit. Progress note:    Patient is feeling better. His results were reviewed. He is advised to follow-up and return to ER if worse         Critical Care Time:   0    Disposition:  home    DISCHARGE PLAN:  1.    Current Discharge Medication List      START taking these medications    Details   famotidine (Pepcid) 20 mg tablet Take 1 Tab by mouth two (2) times a day. Qty: 20 Tab, Refills: 0      dicyclomine (BentyL) 10 mg capsule Take 1 Cap by mouth four (4) times daily as needed for Abdominal Cramps (abd cramps) for up to 5 days. Qty: 20 Cap, Refills: 0      potassium chloride (Klor-Con/25) 25 mEq pack Take 1 Packet by mouth daily. Qty: 7 Each, Refills: 0         CONTINUE these medications which have CHANGED    Details   methocarbamoL (Robaxin-750) 750 mg tablet Take 1 Tab by mouth four (4) times daily as needed for Muscle Spasm(s). Qty: 20 Tab, Refills: 0           2. Follow-up Information     Follow up With Specialties Details Why Contact Info    Von Cardozo MD Internal Medicine In 2 days As needed [address] [facility]      Brian Velasco MD Gastroenterology  As needed [address]  [address] [facility]  [phone] [place] [address] EMERGENCY DEPT Emergency Medicine  If symptoms worsen [address]  [address] [facility]  [phone]        3. Return to ED if worse     Diagnosis     Clinical Impression:   1. Abdominal pain, LUQ (left upper quadrant)    2. Acute chest pain    3. Hypokalemia    4. Other acute gastritis without hemorrhage    5. Diverticulosis        Attestations:    Any Duran MD    Please note that this dictation was completed with [employer], the computer voice recognition software. Quite often unanticipated grammatical, syntax, homophones, and other interpretive errors are inadvertently transcribed by the computer software. Please disregard these errors. Please excuse any errors that have escaped final proofreading. Thank you.

## 2020-04-28 RX ORDER — CEPHALEXIN 500 MG/1
500 CAPSULE ORAL 3 TIMES DAILY
Qty: 21 CAP | Refills: 0 | Status: SHIPPED | OUTPATIENT
Start: 2020-04-28 | End: 2020-05-05

## 2020-07-08 ENCOUNTER — OFFICE VISIT (OUTPATIENT)
Dept: NEUROLOGY | Age: 65
End: 2020-07-08

## 2020-07-08 DIAGNOSIS — H53.2 DIPLOPIA: ICD-10-CM

## 2020-07-08 DIAGNOSIS — M79.18 MYOFASCIAL MUSCLE PAIN: ICD-10-CM

## 2020-07-08 DIAGNOSIS — R41.3 MEMORY DIFFICULTY: ICD-10-CM

## 2020-07-08 DIAGNOSIS — F07.81 POSTCONCUSSION SYNDROME: ICD-10-CM

## 2020-07-08 DIAGNOSIS — G44.329 CHRONIC POST-TRAUMATIC HEADACHE, NOT INTRACTABLE: Primary | ICD-10-CM

## 2020-07-08 RX ORDER — RIZATRIPTAN BENZOATE 10 MG/1
TABLET, ORALLY DISINTEGRATING ORAL
Qty: 12 TAB | Refills: 2 | Status: SHIPPED | OUTPATIENT
Start: 2020-07-08 | End: 2021-04-14 | Stop reason: SDUPTHER
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
## 2020-12-14 ENCOUNTER — VIRTUAL VISIT (OUTPATIENT)
Dept: NEUROLOGY | Age: 65
End: 2020-12-14
Payer: MEDICARE

## 2020-12-14 DIAGNOSIS — R42 DIZZINESS: ICD-10-CM

## 2020-12-14 DIAGNOSIS — R41.3 MEMORY DIFFICULTY: ICD-10-CM

## 2020-12-14 DIAGNOSIS — G31.84 MCI (MILD COGNITIVE IMPAIRMENT): ICD-10-CM

## 2020-12-14 DIAGNOSIS — H53.2 DIPLOPIA: ICD-10-CM

## 2020-12-14 DIAGNOSIS — F07.81 POSTCONCUSSION SYNDROME: Primary | ICD-10-CM

## 2020-12-14 PROCEDURE — G8417 CALC BMI ABV UP PARAM F/U: HCPCS | Performed by: PSYCHIATRY & NEUROLOGY

## 2020-12-14 PROCEDURE — 99214 OFFICE O/P EST MOD 30 MIN: CPT | Performed by: PSYCHIATRY & NEUROLOGY

## 2020-12-14 PROCEDURE — G8432 DEP SCR NOT DOC, RNG: HCPCS | Performed by: PSYCHIATRY & NEUROLOGY

## 2020-12-14 PROCEDURE — G8536 NO DOC ELDER MAL SCRN: HCPCS | Performed by: PSYCHIATRY & NEUROLOGY

## 2020-12-14 PROCEDURE — G8427 DOCREV CUR MEDS BY ELIG CLIN: HCPCS | Performed by: PSYCHIATRY & NEUROLOGY

## 2020-12-14 PROCEDURE — 1101F PT FALLS ASSESS-DOCD LE1/YR: CPT | Performed by: PSYCHIATRY & NEUROLOGY

## 2020-12-14 PROCEDURE — 3017F COLORECTAL CA SCREEN DOC REV: CPT | Performed by: PSYCHIATRY & NEUROLOGY
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
## 2021-01-07 ENCOUNTER — TELEPHONE (OUTPATIENT)
Dept: NEUROLOGY | Age: 66
End: 2021-01-07
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
## 2021-01-13 ENCOUNTER — TELEPHONE (OUTPATIENT)
Dept: NEUROLOGY | Age: 66
End: 2021-01-13

## 2021-01-13 NOTE — TELEPHONE ENCOUNTER
----- Message from [employer] sent at 1/12/2021  3:37 PM EST -----  Regarding: Dr. Kasper/ telephone  General Message/Vendor Calls    Caller's first and last name:      Reason for call: appointment with Dr. Candis Wilkins required yes/no and why:      Best contact number(s): [phone]      Details to clarify the request: Patient believes that he was referred to Dr Taran Mcclellan by Dr. Inez Ferreira and is requesting an appointment.  Pt lost one year of memory and was told that he was needing to see a Psy MD. Pt is requesting a virtual visit       Tonio Sanchez

## 2021-01-26 ENCOUNTER — TELEPHONE (OUTPATIENT)
Dept: NEUROLOGY | Age: 66
End: 2021-01-26

## 2021-01-26 ENCOUNTER — VIRTUAL VISIT (OUTPATIENT)
Dept: NEUROLOGY | Age: 66
End: 2021-01-26

## 2021-01-26 DIAGNOSIS — F07.81 POSTCONCUSSION SYNDROME: Primary | ICD-10-CM

## 2021-01-26 NOTE — TELEPHONE ENCOUNTER
----- Message from Umang Vizcaino sent at 1/26/2021  1:34 PM EST -----  Regarding: Dr. Lolly Hassan  General Message/Vendor Calls    Caller's first and last name: Pt      Reason for call: VV appt      Callback required yes/no and why:      Best contact number(s): [phone]      Details to clarify the request:Pt had a VV with Dr. Esteban Nagel this afternoon at 1 and although they did connect over the phone, pt states the call was dropped and he would like to know if there is anything else Dr. Esteban Nagel needed to speak w/ him about or if he got enough information. Please advise.       Umang Vizcaino
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
## 2021-01-29 ENCOUNTER — TELEPHONE (OUTPATIENT)
Dept: NEUROLOGY | Age: 66
End: 2021-01-29

## 2021-01-29 NOTE — TELEPHONE ENCOUNTER
Duplicate. .. pt was also called the same day his appt call was dropped and a v/m was left to reschedule appt to in person at that time.

## 2021-01-29 NOTE — TELEPHONE ENCOUNTER
----- Message from Walker Moore sent at 1/29/2021  8:35 AM EST -----  Regarding: Dr Mcginnis/Telephone  General Message/Vendor Calls    Caller's first and last name: N/A      Reason for call: Pt would like a call back today regarding the referral to Karena from Dr Batsheva Olmos required yes/no and why: yes, to discuss the referral to Karena from Dr Jailene Lantigua contact number(s): [phone]      Details to clarify the request: Pt would like a call back regarding the referral to Karena from Dr Rosa Ruiz. Pt stated during his appt with Psy Doctor Stephan Lauren the call got dropped, pt tried to reconnect soon after the disconnection and it stated the caller was offline.  Pt has not received a callback to complete the video visit and is not pleased with how things are being handled and would like a call back today      Walker Moore

## 2021-02-09 ENCOUNTER — OFFICE VISIT (OUTPATIENT)
Dept: NEUROLOGY | Age: 66
End: 2021-02-09
Payer: MEDICARE

## 2021-02-09 DIAGNOSIS — F07.81 POST CONCUSSIVE SYNDROME: Primary | ICD-10-CM

## 2021-02-09 PROCEDURE — 96116 NUBHVL XM PHYS/QHP 1ST HR: CPT | Performed by: PSYCHOLOGIST
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
## 2021-02-18 ENCOUNTER — TELEPHONE (OUTPATIENT)
Dept: NEUROLOGY | Age: 66
End: 2021-02-18

## 2021-03-12 ENCOUNTER — OFFICE VISIT (OUTPATIENT)
Dept: NEUROLOGY | Age: 66
End: 2021-03-12

## 2021-03-19 ENCOUNTER — OFFICE VISIT (OUTPATIENT)
Dept: NEUROLOGY | Age: 66
End: 2021-03-19
Payer: MEDICARE

## 2021-03-19 DIAGNOSIS — G31.84 MILD COGNITIVE IMPAIRMENT WITH MEMORY LOSS: Primary | ICD-10-CM

## 2021-03-19 PROCEDURE — 96139 PSYCL/NRPSYC TST TECH EA: CPT | Performed by: PSYCHOLOGIST

## 2021-03-19 PROCEDURE — 96132 NRPSYC TST EVAL PHYS/QHP 1ST: CPT | Performed by: PSYCHOLOGIST

## 2021-03-19 PROCEDURE — 96138 PSYCL/NRPSYC TECH 1ST: CPT | Performed by: PSYCHOLOGIST

## 2021-03-19 PROCEDURE — 96136 PSYCL/NRPSYC TST PHY/QHP 1ST: CPT | Performed by: PSYCHOLOGIST

## 2021-03-19 PROCEDURE — 96137 PSYCL/NRPSYC TST PHY/QHP EA: CPT | Performed by: PSYCHOLOGIST

## 2021-03-19 PROCEDURE — 96133 NRPSYC TST EVAL PHYS/QHP EA: CPT | Performed by: PSYCHOLOGIST
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
## 2021-04-01 ENCOUNTER — OFFICE VISIT (OUTPATIENT)
Dept: NEUROLOGY | Age: 66
End: 2021-04-01
Payer: MEDICARE

## 2021-04-01 DIAGNOSIS — G31.84 MILD COGNITIVE IMPAIRMENT WITH MEMORY LOSS: Primary | ICD-10-CM

## 2021-04-01 PROCEDURE — G8427 DOCREV CUR MEDS BY ELIG CLIN: HCPCS | Performed by: PSYCHOLOGIST

## 2021-04-01 PROCEDURE — 90832 PSYTX W PT 30 MINUTES: CPT | Performed by: PSYCHOLOGIST

## 2021-04-01 PROCEDURE — G8432 DEP SCR NOT DOC, RNG: HCPCS | Performed by: PSYCHOLOGIST

## 2021-04-01 NOTE — PROGRESS NOTES
Pursuant to the emergency declaration under the [address] [facility] waiver authority and the [employer] Act, this Virtual Visit was conducted, with appropriate consent obtained, to reduce the patient's risk of exposure to COVID-19 and provide continuity of care   Services were provided in this manner to substitute for in-person clinic visit. The originating site is the patient's home and the distance site is [facility] at [facility]. These types of teleneuropsychology/telehealth/telemedicine visits were authorized by the President of the [employer], though I/we cannot guarantee what a third party payor will do reimbursement/coverage wise. I indicated that I would evaluate the patient and recommend diagnostics and treatment based on my assessment and impressions, and that our sessions are not being recorded and that personal health information is protected to the best of our abilities. [facility] at [address]    Office:  [phone]  Fax: [phone]                 Follow-up Session    Patient Aviva Martin y.o. [address]   [address] [facility]   Presenting Concern: Post-Concussive Injury   Primary Care Physician: Jackson, Jens Burkitt, MD  Referring Provider: No ref. provider found    Wily Whipple is a 74 y.o. male who presents today for feedback following recent neuropsychological testing. The results were reviewed of the recent neuropsychological evaluation, including discussing individual tests as well as the patient's areas of neurocognitive strength versus their weaknesses. Supportive counseling discussing cognitive changes and the potential impact of those changes.  Education was provided regarding my diagnostic impressions, and we discussed next steps for further evaluation down the road. I also answered numerous questions related to the clinical findings, including discussing various methods to improve cognitive cognition and mood when relevant. The patient is encouraged to follow-up with the referring provider. DIAGNOSTIC IMPRESSIONS:    ICD-10-CM ICD-9-CM    1. Mild cognitive impairment with memory loss  G31.84 331.83        Time spent with patient in face to face conversation: 16 mins.     86303 30 minutes x 1    Sammie Guillermo, PHD

## 2021-04-14 ENCOUNTER — OFFICE VISIT (OUTPATIENT)
Dept: NEUROLOGY | Age: 66
End: 2021-04-14
Payer: MEDICARE

## 2021-04-14 VITALS
RESPIRATION RATE: 20 BRPM | HEIGHT: 73 IN | OXYGEN SATURATION: 96 % | WEIGHT: 254 LBS | BODY MASS INDEX: 33.66 KG/M2 | HEART RATE: 86 BPM | DIASTOLIC BLOOD PRESSURE: 70 MMHG | TEMPERATURE: 97.3 F | SYSTOLIC BLOOD PRESSURE: 124 MMHG

## 2021-04-14 DIAGNOSIS — F32.89 OTHER DEPRESSION: ICD-10-CM

## 2021-04-14 DIAGNOSIS — R42 DIZZINESS: ICD-10-CM
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
## 2021-04-14 DIAGNOSIS — F07.81 POST CONCUSSION SYNDROME: Primary | ICD-10-CM

## 2021-04-14 DIAGNOSIS — Z13.31 POSITIVE DEPRESSION SCREENING: ICD-10-CM

## 2021-04-14 DIAGNOSIS — R41.3 MEMORY DIFFICULTY: ICD-10-CM

## 2021-04-14 DIAGNOSIS — M54.2 CERVICALGIA: ICD-10-CM

## 2021-04-14 DIAGNOSIS — G47.9 SLEEP DISORDER: ICD-10-CM

## 2021-04-14 DIAGNOSIS — G62.9 NEUROPATHY: ICD-10-CM

## 2021-04-14 PROCEDURE — 99214 OFFICE O/P EST MOD 30 MIN: CPT | Performed by: PSYCHIATRY & NEUROLOGY

## 2021-04-14 PROCEDURE — 1101F PT FALLS ASSESS-DOCD LE1/YR: CPT | Performed by: PSYCHIATRY & NEUROLOGY

## 2021-04-14 PROCEDURE — G8427 DOCREV CUR MEDS BY ELIG CLIN: HCPCS | Performed by: PSYCHIATRY & NEUROLOGY

## 2021-04-14 PROCEDURE — G8536 NO DOC ELDER MAL SCRN: HCPCS | Performed by: PSYCHIATRY & NEUROLOGY

## 2021-04-14 PROCEDURE — G8417 CALC BMI ABV UP PARAM F/U: HCPCS | Performed by: PSYCHIATRY & NEUROLOGY

## 2021-04-14 PROCEDURE — G8431 POS CLIN DEPRES SCRN F/U DOC: HCPCS | Performed by: PSYCHIATRY & NEUROLOGY

## 2021-04-14 PROCEDURE — 3017F COLORECTAL CA SCREEN DOC REV: CPT | Performed by: PSYCHIATRY & NEUROLOGY

## 2021-04-14 RX ORDER — METHOCARBAMOL 750 MG/1
750 TABLET, FILM COATED ORAL
Qty: 20 TAB | Refills: 0 | OUTPATIENT
Start: 2021-04-14 | End: 2022-07-07

## 2021-04-14 RX ORDER — GABAPENTIN 300 MG/1
300 CAPSULE ORAL 2 TIMES DAILY
Qty: 60 CAP | Refills: 3 | Status: SHIPPED | OUTPATIENT
Start: 2021-04-14

## 2021-04-14 RX ORDER — ESCITALOPRAM OXALATE 10 MG/1
10 TABLET ORAL DAILY
Qty: 30 TAB | Refills: 2 | Status: SHIPPED | OUTPATIENT
Start: 2021-04-14 | End: 2021-07-06

## 2021-04-14 RX ORDER — RIZATRIPTAN BENZOATE 10 MG/1
TABLET, ORALLY DISINTEGRATING ORAL
Qty: 12 TAB | Refills: 2 | Status: SHIPPED | OUTPATIENT
Start: 2021-04-14
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
## 2021-07-06 RX ORDER — ESCITALOPRAM OXALATE 10 MG/1
TABLET ORAL
Qty: 30 TABLET | Refills: 2 | Status: SHIPPED | OUTPATIENT
Start: 2021-07-06

## 2022-02-20 ENCOUNTER — HOSPITAL ENCOUNTER (EMERGENCY)
Age: 67
Discharge: HOME | End: 2022-02-20
Attending: EMERGENCY MEDICINE
Payer: MEDICARE

## 2022-02-20 VITALS
SYSTOLIC BLOOD PRESSURE: 156 MMHG | BODY MASS INDEX: 35.56 KG/M2 | DIASTOLIC BLOOD PRESSURE: 100 MMHG | WEIGHT: 254 LBS | HEIGHT: 71 IN | OXYGEN SATURATION: 99 % | TEMPERATURE: 98.4 F | HEART RATE: 66 BPM | RESPIRATION RATE: 18 BRPM

## 2022-02-20 DIAGNOSIS — G89.29 CHRONIC BILATERAL LOW BACK PAIN WITH RIGHT-SIDED SCIATICA: Primary | ICD-10-CM

## 2022-02-20 DIAGNOSIS — M54.41 CHRONIC BILATERAL LOW BACK PAIN WITH RIGHT-SIDED SCIATICA: Primary | ICD-10-CM

## 2022-02-20 PROCEDURE — 74011250637 HC RX REV CODE- 250/637: Performed by: NURSE PRACTITIONER

## 2022-02-20 PROCEDURE — 99283 EMERGENCY DEPT VISIT LOW MDM: CPT

## 2022-02-20 RX ORDER — PREDNISONE 5 MG/1
TABLET ORAL
Qty: 21 TABLET | Refills: 0 | Status: SHIPPED | OUTPATIENT
Start: 2022-02-20

## 2022-02-20 RX ORDER — NAPROXEN 250 MG/1
500 TABLET ORAL
Status: COMPLETED | OUTPATIENT
Start: 2022-02-20 | End: 2022-02-20

## 2022-02-20 RX ORDER — DICLOFENAC SODIUM 75 MG/1
75 TABLET, DELAYED RELEASE ORAL 2 TIMES DAILY
Qty: 30 TABLET | Refills: 0 | Status: SHIPPED | OUTPATIENT
Start: 2022-02-20

## 2022-02-20 RX ORDER — FAMOTIDINE 20 MG/1
20 TABLET, FILM COATED ORAL 2 TIMES DAILY
Qty: 20 TABLET | Refills: 0 | Status: SHIPPED | OUTPATIENT
Start: 2022-02-20 | End: 2022-03-02

## 2022-02-20 RX ADMIN — NAPROXEN 500 MG: 250 TABLET ORAL at 16:10
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
## 2022-02-24 ENCOUNTER — TELEPHONE (OUTPATIENT)
Dept: NEUROLOGY | Age: 67
End: 2022-02-24

## 2022-02-24 NOTE — TELEPHONE ENCOUNTER
VOICEMAIL    Pt is using friend's phone please make sure to call [phone]    Pt needs a letter for his [employer] person. 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 Wants a letter from Dr. 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 Pt states he needs the letter today 2/24. Please call for more details.  [phone]

## 2022-02-28 ENCOUNTER — HOSPITAL ENCOUNTER (EMERGENCY)
Age: 67
Discharge: HOME | End: 2022-03-01
Attending: EMERGENCY MEDICINE
Payer: MEDICARE

## 2022-02-28 ENCOUNTER — APPOINTMENT (OUTPATIENT)
Dept: CT IMAGING | Age: 67
End: 2022-02-28
Attending: PHYSICIAN ASSISTANT
Payer: MEDICARE

## 2022-02-28 ENCOUNTER — TELEPHONE (OUTPATIENT)
Dept: NEUROLOGY | Age: 67
End: 2022-02-28

## 2022-02-28 DIAGNOSIS — R42 EPISODIC LIGHTHEADEDNESS: Primary | ICD-10-CM

## 2022-02-28 DIAGNOSIS — H53.8 HAZY VISION: ICD-10-CM

## 2022-02-28 DIAGNOSIS — I10 HYPERTENSION, UNSPECIFIED TYPE: ICD-10-CM

## 2022-02-28 DIAGNOSIS — E87.6 HYPOKALEMIA: ICD-10-CM

## 2022-02-28 LAB
ALBUMIN SERPL-MCNC: 3.6 G/DL (ref 3.5–5)
ALBUMIN/GLOB SERPL: 1 {RATIO} (ref 1.1–2.2)
ALP SERPL-CCNC: 88 U/L (ref 45–117)
ALT SERPL-CCNC: 29 U/L (ref 12–78)
ANION GAP SERPL CALC-SCNC: 8 MMOL/L (ref 5–15)
AST SERPL-CCNC: 22 U/L (ref 15–37)
BASOPHILS # BLD: 0.1 K/UL (ref 0–0.1)
BASOPHILS NFR BLD: 1 % (ref 0–1)
BILIRUB SERPL-MCNC: 0.6 MG/DL (ref 0.2–1)
BUN SERPL-MCNC: 18 MG/DL (ref 6–20)
BUN/CREAT SERPL: 13 (ref 12–20)
CALCIUM SERPL-MCNC: 9.1 MG/DL (ref 8.5–10.1)
CHLORIDE SERPL-SCNC: 105 MMOL/L (ref 97–108)
CO2 SERPL-SCNC: 29 MMOL/L (ref 21–32)
CREAT SERPL-MCNC: 1.39 MG/DL (ref 0.7–1.3)
DIFFERENTIAL METHOD BLD: ABNORMAL
EOSINOPHIL # BLD: 0.1 K/UL (ref 0–0.4)
EOSINOPHIL NFR BLD: 1 % (ref 0–7)
ERYTHROCYTE [DISTWIDTH] IN BLOOD BY AUTOMATED COUNT: 12.6 % (ref 11.5–14.5)
GLOBULIN SER CALC-MCNC: 3.7 G/DL (ref 2–4)
GLUCOSE SERPL-MCNC: 87 MG/DL (ref 65–100)
HCT VFR BLD AUTO: 43.8 % (ref 36.6–50.3)
HGB BLD-MCNC: 15.2 G/DL (ref 12.1–17)
IMM GRANULOCYTES # BLD AUTO: 0.1 K/UL (ref 0–0.04)
IMM GRANULOCYTES NFR BLD AUTO: 1 % (ref 0–0.5)
INR PPP: 1.2 (ref 0.9–1.1)
LYMPHOCYTES # BLD: 1.7 K/UL (ref 0.8–3.5)
LYMPHOCYTES NFR BLD: 18 % (ref 12–49)
MCH RBC QN AUTO: 30.9 PG (ref 26–34)
MCHC RBC AUTO-ENTMCNC: 34.7 G/DL (ref 30–36.5)
MCV RBC AUTO: 89 FL (ref 80–99)
MONOCYTES # BLD: 0.7 K/UL (ref 0–1)
MONOCYTES NFR BLD: 8 % (ref 5–13)
NEUTS SEG # BLD: 6.7 K/UL (ref 1.8–8)
NEUTS SEG NFR BLD: 71 % (ref 32–75)
NRBC # BLD: 0 K/UL (ref 0–0.01)
NRBC BLD-RTO: 0 PER 100 WBC
PLATELET # BLD AUTO: 283 K/UL (ref 150–400)
PMV BLD AUTO: 9.5 FL (ref 8.9–12.9)
POTASSIUM SERPL-SCNC: 3.4 MMOL/L (ref 3.5–5.1)
PROT SERPL-MCNC: 7.3 G/DL (ref 6.4–8.2)
PROTHROMBIN TIME: 11.5 SEC (ref 9–11.1)
RBC # BLD AUTO: 4.92 M/UL (ref 4.1–5.7)
SODIUM SERPL-SCNC: 142 MMOL/L (ref 136–145)
TROPONIN-HIGH SENSITIVITY: 15 NG/L (ref 0–76)
WBC # BLD AUTO: 9.4 K/UL (ref 4.1–11.1)

## 2022-02-28 PROCEDURE — 70450 CT HEAD/BRAIN W/O DYE: CPT

## 2022-02-28 PROCEDURE — 99284 EMERGENCY DEPT VISIT MOD MDM: CPT

## 2022-02-28 PROCEDURE — 85025 COMPLETE CBC W/AUTO DIFF WBC: CPT

## 2022-02-28 PROCEDURE — 80053 COMPREHEN METABOLIC PANEL: CPT

## 2022-02-28 PROCEDURE — 85610 PROTHROMBIN TIME: CPT

## 2022-02-28 PROCEDURE — 84484 ASSAY OF TROPONIN QUANT: CPT

## 2022-02-28 PROCEDURE — 93005 ELECTROCARDIOGRAM TRACING: CPT

## 2022-02-28 PROCEDURE — 36415 COLL VENOUS BLD VENIPUNCTURE: CPT

## 2022-03-01 VITALS
RESPIRATION RATE: 16 BRPM | OXYGEN SATURATION: 95 % | BODY MASS INDEX: 35.56 KG/M2 | TEMPERATURE: 98.1 F | HEART RATE: 73 BPM | SYSTOLIC BLOOD PRESSURE: 152 MMHG | DIASTOLIC BLOOD PRESSURE: 78 MMHG | WEIGHT: 254 LBS | HEIGHT: 71 IN

## 2022-03-01 LAB
ATRIAL RATE: 81 BPM
CALCULATED P AXIS, ECG09: 30 DEGREES
CALCULATED R AXIS, ECG10: -48 DEGREES
CALCULATED T AXIS, ECG11: 23 DEGREES
DIAGNOSIS, 93000: NORMAL
P-R INTERVAL, ECG05: 156 MS
Q-T INTERVAL, ECG07: 412 MS
QRS DURATION, ECG06: 100 MS
QTC CALCULATION (BEZET), ECG08: 478 MS
TROPONIN-HIGH SENSITIVITY: 14 NG/L (ref 0–76)
VENTRICULAR RATE, ECG03: 81 BPM

## 2022-03-01 PROCEDURE — 74011250637 HC RX REV CODE- 250/637: Performed by: PHYSICIAN ASSISTANT

## 2022-03-01 PROCEDURE — 74011250637 HC RX REV CODE- 250/637: Performed by: EMERGENCY MEDICINE

## 2022-03-01 PROCEDURE — 84484 ASSAY OF TROPONIN QUANT: CPT

## 2022-03-01 RX ORDER — CLONIDINE HYDROCHLORIDE 0.1 MG/1
0.1 TABLET ORAL
Status: COMPLETED | OUTPATIENT
Start: 2022-03-01 | End: 2022-03-01

## 2022-03-01 RX ORDER — POTASSIUM CHLORIDE 750 MG/1
20 TABLET, FILM COATED, EXTENDED RELEASE ORAL
Status: COMPLETED | OUTPATIENT
Start: 2022-03-01 | End: 2022-03-01

## 2022-03-01 RX ORDER — BUTALBITAL, ACETAMINOPHEN AND CAFFEINE 50; 325; 40 MG/1; MG/1; MG/1
2 TABLET ORAL
Status: COMPLETED | OUTPATIENT
Start: 2022-03-01 | End: 2022-03-01

## 2022-03-01 RX ADMIN — POTASSIUM CHLORIDE 20 MEQ: 750 TABLET, FILM COATED, EXTENDED RELEASE ORAL at 01:12

## 2022-03-01 RX ADMIN — CLONIDINE HYDROCHLORIDE 0.1 MG: 0.1 TABLET ORAL at 02:54

## 2022-03-01 RX ADMIN — BUTALBITAL, ACETAMINOPHEN, AND CAFFEINE 2 TABLET: 50; 325; 40 TABLET ORAL at 02:54
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
## 2022-03-01 NOTE — DISCHARGE INSTRUCTIONS
Thank You! It was a pleasure taking care of you in our Emergency Department today. We know that when you come to [email or web address], you are entrusting us with your health, comfort, and safety. Our physicians and nurses honor that trust, and truly appreciate the opportunity to care for you and your loved ones. We also value your feedback. If you receive a survey about your Emergency Department experience today, please fill it out. We care about our patients' feedback, and we listen to what you have to say. Thank you. Dr. Isaac Vigil M.D.      ____________________________________________________________________  I have included a copy of your lab results and/or radiologic studies from today's visit so you can have them easily available at your follow-up visit. We hope you feel better and please do not hesitate to contact the ED if you have any questions at all!     Recent Results (from the past 12 hour(s))   EKG, 12 LEAD, INITIAL    Collection Time: 02/28/22 10:49 PM   Result Value Ref Range    Ventricular Rate 81 BPM    Atrial Rate 81 BPM    P-R Interval 156 ms    QRS Duration 100 ms    Q-T Interval 412 ms    QTC Calculation (Bezet) 478 ms    Calculated P Axis 30 degrees    Calculated R Axis -48 degrees    Calculated T Axis 23 degrees    Diagnosis       Sinus rhythm with marked sinus arrhythmia  Left axis deviation  Nonspecific T wave abnormality  Prolonged QT  When compared with ECG of 26-APR-2020 03:35,  ST elevation now present in Anterior leads     CBC WITH AUTOMATED DIFF    Collection Time: 02/28/22 11:04 PM   Result Value Ref Range    WBC 9.4 4.1 - 11.1 K/uL    RBC 4.92 4.10 - 5.70 M/uL    HGB 15.2 12.1 - 17.0 g/dL    HCT 43.8 36.6 - 50.3 %    MCV 89.0 80.0 - 99.0 FL    MCH 30.9 26.0 - 34.0 PG    MCHC 34.7 30.0 - 36.5 g/dL    RDW 12.6 11.5 - 14.5 %    PLATELET 940 406 - 015 K/uL    MPV 9.5 8.9 - 12.9 FL    NRBC 0.0 0  WBC    ABSOLUTE NRBC 0.00 0.00 - 0.01 K/uL NEUTROPHILS 71 32 - 75 %    LYMPHOCYTES 18 12 - 49 %    MONOCYTES 8 5 - 13 %    EOSINOPHILS 1 0 - 7 %    BASOPHILS 1 0 - 1 %    IMMATURE GRANULOCYTES 1 (H) 0.0 - 0.5 %    ABS. NEUTROPHILS 6.7 1.8 - 8.0 K/UL    ABS. LYMPHOCYTES 1.7 0.8 - 3.5 K/UL    ABS. MONOCYTES 0.7 0.0 - 1.0 K/UL    ABS. EOSINOPHILS 0.1 0.0 - 0.4 K/UL    ABS. BASOPHILS 0.1 0.0 - 0.1 K/UL    ABS. IMM. GRANS. 0.1 (H) 0.00 - 0.04 K/UL    DF AUTOMATED     METABOLIC PANEL, COMPREHENSIVE    Collection Time: 02/28/22 11:04 PM   Result Value Ref Range    Sodium 142 136 - 145 mmol/L    Potassium 3.4 (L) 3.5 - 5.1 mmol/L    Chloride 105 97 - 108 mmol/L    CO2 29 21 - 32 mmol/L    Anion gap 8 5 - 15 mmol/L    Glucose 87 65 - 100 mg/dL    BUN 18 6 - 20 MG/DL    Creatinine 1.39 (H) 0.70 - 1.30 MG/DL    BUN/Creatinine ratio 13 12 - 20      GFR est AA >60 >60 ml/min/1.73m2    GFR est non-AA 51 (L) >60 ml/min/1.73m2    Calcium 9.1 8.5 - 10.1 MG/DL    Bilirubin, total 0.6 0.2 - 1.0 MG/DL    ALT (SGPT) 29 12 - 78 U/L    AST (SGOT) 22 15 - 37 U/L    Alk. phosphatase 88 45 - 117 U/L    Protein, total 7.3 6.4 - 8.2 g/dL    Albumin 3.6 3.5 - 5.0 g/dL    Globulin 3.7 2.0 - 4.0 g/dL    A-G Ratio 1.0 (L) 1.1 - 2.2     TROPONIN-HIGH SENSITIVITY    Collection Time: 02/28/22 11:04 PM   Result Value Ref Range    Troponin-High Sensitivity 15 0 - 76 ng/L   PROTHROMBIN TIME + INR    Collection Time: 02/28/22 11:04 PM   Result Value Ref Range    INR 1.2 (H) 0.9 - 1.1      Prothrombin time 11.5 (H) 9.0 - 11.1 sec   TROPONIN-HIGH SENSITIVITY    Collection Time: 03/01/22  1:11 AM   Result Value Ref Range    Troponin-High Sensitivity 14 0 - 76 ng/L       CT HEAD WO CONT   Final Result   1. No evidence of acute intracranial abnormality. 2. Moderate to severe chronic microvascular ischemic disease, which has   progressed since 2019. 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            CT Results  (Last 48 hours)                 03/01/22 0032  CT HEAD WO CONT Final result Impression:  1. No evidence of acute intracranial abnormality. 2. Moderate to severe chronic microvascular ischemic disease, which has   progressed since 2019. 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 Narrative:  EXAM:  CT head without contrast       INDICATION:   Blurred vision and lightheadedness. COMPARISON: MRI brain 10/23/2019, CT head 3/16/2019. TECHNIQUE: Unenhanced CT of the head was performed using 5 mm images. Brain and   bone windows were generated. CT dose reduction was achieved through use of a   standardized protocol tailored for this examination and automatic exposure   control for dose modulation. FINDINGS:   The ventricles are normal in size and position. Scattered periventricular and   deep white matter hypodensities, confluent in regions, consistent with moderate   to severe chronic microangiopathic ischemic changes, and progressed since prior   exam. 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 Basilar cisterns are   patent. No midline shift. There is no evidence of acute infarct, hemorrhage, or   extraaxial fluid collection. The paranasal sinuses, mastoid air cells, and middle ears are clear. The orbital   contents are within normal limits. There are no significant osseous or   extracranial soft tissue lesions. The exam and treatment you received in the Emergency Department were for an urgent problem and are not intended as complete care. It is important that you follow up with a doctor, nurse practitioner, or physician assistant for ongoing care. If your symptoms become worse or you do not improve as expected and you are unable to reach your usual health care provider, you should return to the Emergency Department. We are available 24 hours a day. Please take your discharge instructions with you when you go to your follow-up appointment.      If a prescription has been provided, please have it filled as soon as possible to prevent a delay in treatment. Read the entire medication instruction sheet provided to you by the pharmacy. If you have any questions or reservations about taking the medication due to side effects or interactions with other medications, please call your primary care physician or contact the ER to speak with the charge nurse. Please make an appointment with your family doctor or the physician you were referred to for follow-up of this visit as instructed on your discharge paperwork. Return to the ER if you are unable to be seen or if you are unable to be seen in a timely manner. If you have any problem arranging the follow-up visit, contact the Emergency Department immediately.

## 2022-03-01 NOTE — ED NOTES
Discharge instructions were given to the patient by Lisha Lopez    . The patient left the Emergency Department ambulatory, alert and oriented and in no acute distress with no prescriptions. The patient was encouraged to call or return to the ED for worsening issues or problems and was encouraged to schedule a follow up appointment for continuing care. The patient verbalized understanding of discharge instructions and prescriptions, all questions were answered. The patient has no further concerns at this time.
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
## 2022-03-17 ENCOUNTER — TELEPHONE (OUTPATIENT)
Dept: NEUROLOGY | Age: 67
End: 2022-03-17

## 2022-03-17 NOTE — TELEPHONE ENCOUNTER
Patient would like a letter stating that he is not able to due community service.  Please call pt once completed

## 2022-03-20 PROBLEM — E66.01 SEVERE OBESITY (HCC): Status: ACTIVE | Noted: 2019-08-14

## 2022-03-22 NOTE — TELEPHONE ENCOUNTER
No showed at appointment 3/17/22 to discuss this. Will need an appointment to discuss thi further as his last appointment [facility] 4/14/2021    I left a message of this and asked to call back to set up the appointment.  I did note that appointments are out a few months

## 2022-05-21 PROCEDURE — 99283 EMERGENCY DEPT VISIT LOW MDM: CPT

## 2022-05-22 ENCOUNTER — HOSPITAL ENCOUNTER (EMERGENCY)
Age: 67
Discharge: HOME | End: 2022-05-22
Attending: EMERGENCY MEDICINE
Payer: MEDICARE

## 2022-05-22 VITALS
OXYGEN SATURATION: 98 % | HEIGHT: 71 IN | RESPIRATION RATE: 20 BRPM | DIASTOLIC BLOOD PRESSURE: 91 MMHG | BODY MASS INDEX: 35.56 KG/M2 | WEIGHT: 254 LBS | HEART RATE: 62 BPM | TEMPERATURE: 98 F | SYSTOLIC BLOOD PRESSURE: 147 MMHG

## 2022-05-22 DIAGNOSIS — M72.2 PLANTAR FASCIITIS: Primary | ICD-10-CM

## 2022-05-22 RX ORDER — IBUPROFEN 600 MG/1
600 TABLET ORAL
Status: DISCONTINUED | OUTPATIENT
Start: 2022-05-22 | End: 2022-05-22 | Stop reason: HOSPADM

## 2022-05-22 NOTE — ED PROVIDER NOTES
EMERGENCY DEPARTMENT HISTORY AND PHYSICAL EXAM      Date: 5/22/2022  Patient Name: Kim Urena    Please note that this dictation was completed with [employer], the computer voice recognition software. Quite often unanticipated grammatical, syntax, homophones, and other interpretive errors are inadvertently transcribed by the computer software. Please disregard these errors. Please excuse any errors that have escaped final proofreading. History of Presenting Illness     Chief Complaint   Patient presents with    Foot Pain       History Provided By: Patient     HPI: Kim Urena, 69 y.o. male, presenting the emergency department complaining of right foot pain. Patient states he has a longstanding history of Planter fasciitis. He lost his orthotics he has been taping his foot, but not significantly improving his pain. He is requesting some tape for his foot and a chance to charge his phone. PCP: María Fernandez MD    No current facility-administered medications on file prior to encounter. Current Outpatient Medications on File Prior to Encounter   Medication Sig Dispense Refill    diclofenac EC (VOLTAREN) 75 mg EC tablet Take 1 Tablet by mouth two (2) times a day. 30 Tablet 0    predniSONE (STERAPRED) 5 mg dose pack See administration instruction per 5mg dose pack 21 Tablet 0    escitalopram oxalate (LEXAPRO) 10 mg tablet TAKE 1 TABLET BY MOUTH DAILY 30 Tablet 2    gabapentin (NEURONTIN) 300 mg capsule Take 1 Cap by mouth two (2) times a day. 60 Cap 3    methocarbamoL (Robaxin-750) 750 mg tablet Take 1 Tab by mouth four (4) times daily as needed for Muscle Spasm(s). 20 Tab 0    rizatriptan (MAXALT-MLT) 10 mg disintegrating tablet Take 1 tablet as needed for severe headache, repeat if headache persist after 1 to 2 hours, not to exceed 2 doses in 24 hours. 12 Tab 2    mirabegron ER (Myrbetriq) 50 mg ER tablet Take 50 mg by mouth daily.       potassium chloride (Klor-Con/25) 25 mEq pack Take 1 Packet by mouth daily. 7 Each 0    diazePAM (VALIUM) 10 mg tablet Take 1 tablet 1 hour before the MRI procedure. 2 Tab 0    acetaminophen-codeine (TYLENOL #3) 300-30 mg per tablet nightly.  amLODIPine (NORVASC) 5 mg tablet daily. 3    hydroCHLOROthiazide (HYDRODIURIL) 12.5 mg tablet Take 12.5 mg by mouth daily.  ergocalciferol (VITAMIN D2) 50,000 unit capsule Take 50,000 Units by mouth every thirty (30) days.  acetaminophen (TYLENOL ARTHRITIS PAIN) 650 mg TbER Take 1 Tab by mouth every eight (8) hours. 20 Tab 0       Past History     Past Medical History:  Past Medical History:   Diagnosis Date    Frequent headaches     Hypertension     Muscle pain     Neuropathy     Visual disturbance        Past Surgical History:  Past Surgical History:   Procedure Laterality Date    HX HERNIA REPAIR      times 2       Family History:  Family History   Problem Relation Age of Onset    Stroke Mother     Heart Disease Father     Neuropathy Father        Social History:  Social History     Tobacco Use    Smoking status: Current Some Day Smoker    Smokeless tobacco: Never Used    Tobacco comment: quit 6 years ago   Substance Use Topics    Alcohol use: No    Drug use: Not Currently       Allergies:  No Known Allergies      Review of Systems   Review of Systems   Constitutional: Negative for chills and fever. Gastrointestinal: Negative for nausea and vomiting. Musculoskeletal: Positive for arthralgias. Skin: Negative for rash and wound. Physical Exam   Physical Exam  Constitutional:       Appearance: Normal appearance. HENT:      Head: Normocephalic and atraumatic. Nose: Nose normal.      Mouth/Throat:      Mouth: Mucous membranes are moist.   Neck:      Comments: Trachea midline  Cardiovascular:      Comments: Normal peripheral perfusion  Pulmonary:      Effort: Pulmonary effort is normal. No respiratory distress. Abdominal:      General: Abdomen is flat. There is no distension. Musculoskeletal:         General: No deformity. Normal range of motion. Comments: No gross deformity, palpable pulses in the DP. Mild tenderness to palpation on the plantar surface of the foot   Skin:     General: Skin is warm and dry. Neurological:      General: No focal deficit present. Mental Status: He is alert and oriented to person, place, and time. Psychiatric:         Mood and Affect: Mood normal.         Diagnostic Study Results     Labs -   No results found for this or any previous visit (from the past 12 hour(s)). Radiologic Studies -   No orders to display     CT Results  (Last 48 hours)    None        CXR Results  (Last 48 hours)    None            Medical Decision Making   I am the first provider for this patient. I reviewed the vital signs, available nursing notes, past medical history, past surgical history, family history and social history. Vital Signs-Reviewed the patient's vital signs. Patient Vitals for the past 12 hrs:   Temp Pulse Resp BP SpO2   05/22/22 0108 -- -- -- (!) 147/91 --   05/21/22 2253 98 °F (36.7 °C) 62 20 -- 98 %           Records Reviewed:   Nursing notes, Prior visits     Provider Notes (Medical Decision Making):   Patient evaluated found to be in no acute cardiopulmonary distress. Pain is consistent with Planter fasciitis    ED Course:   Initial assessment performed. The patients presenting problems have been discussed, and they are in agreement with the care plan formulated and outlined with them. I have encouraged them to ask questions as they arise throughout their visit. Critical Care Time:   none    Disposition:    DISCHARGE NOTE  Patients results have been reviewed with them.   Patient and/or family have verbally conveyed their understanding and agreement of the patient's signs, symptoms, diagnosis, treatment and prognosis and additionally agree to follow up as recommended or return to the Emergency Room should their condition change or have any new concerns prior to their follow-up appointment. Patient verbally agrees with the care-plan and verbally conveys that all of their questions have been answered. Discharge instructions have also been provided to the patient with some educational information regarding their diagnosis as well a list of reasons why they would want to return to the ER prior to their follow-up appointment should their condition change. PLAN:  1. Discharge Medication List as of 5/22/2022 12:42 AM        2. Follow-up Information     Follow up With Specialties Details Why [address] EMERGENCY DEPT Emergency Medicine  If symptoms worsen [address] [facility]    Desiree Jose DPM Podiatry Schedule an appointment as soon as possible for a visit   [facility]  [phone]            Return to ED if worse     Diagnosis     Clinical Impression:   1. Plantar fasciitis        Attestations:   This note was completed by Khari Kim DO

## 2022-05-22 NOTE — ED NOTES
Emergency Department Nursing Plan of Care       The Nursing Plan of Care is developed from the Nursing assessment and Emergency Department Attending provider initial evaluation. The plan of care may be reviewed in the ED Provider note.     The Plan of Care was developed with the following considerations:   Patient / Family readiness to learn indicated by:verbalized understanding  Persons(s) to be included in education: patient  Barriers to Learning/Limitations:No    Signed     Philip Jaquez RN    5/22/2022   1:12 AM

## 2022-05-22 NOTE — ED NOTES
Discharge instructions were given to the patient by Fish Mcintosh RN. The patient left the Emergency Department ambulatory, alert and oriented and in no acute distress with no prescriptions. The patient was encouraged to call or return to the ED for worsening issues or problems and was encouraged to schedule a follow up appointment for continuing care. The patient verbalized understanding of discharge instructions and prescriptions, all questions were answered. The patient has no further concerns at this time.

## 2022-07-07 ENCOUNTER — APPOINTMENT (OUTPATIENT)
Dept: GENERAL RADIOLOGY | Age: 67
End: 2022-07-07
Attending: EMERGENCY MEDICINE
Payer: MEDICARE

## 2022-07-07 ENCOUNTER — HOSPITAL ENCOUNTER (EMERGENCY)
Age: 67
Discharge: HOME | End: 2022-07-07
Attending: EMERGENCY MEDICINE
Payer: MEDICARE

## 2022-07-07 VITALS
SYSTOLIC BLOOD PRESSURE: 151 MMHG | OXYGEN SATURATION: 95 % | HEIGHT: 73 IN | WEIGHT: 269 LBS | HEART RATE: 69 BPM | TEMPERATURE: 98 F | BODY MASS INDEX: 35.65 KG/M2 | RESPIRATION RATE: 18 BRPM | DIASTOLIC BLOOD PRESSURE: 86 MMHG

## 2022-07-07 DIAGNOSIS — S20.212A CONTUSION OF LEFT CHEST WALL, INITIAL ENCOUNTER: ICD-10-CM

## 2022-07-07 DIAGNOSIS — T14.8XXA ABRASION: ICD-10-CM
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
## 2022-07-07 PROCEDURE — 93005 ELECTROCARDIOGRAM TRACING: CPT

## 2022-07-07 PROCEDURE — 74011000250 HC RX REV CODE- 250: Performed by: EMERGENCY MEDICINE

## 2022-07-07 PROCEDURE — 99284 EMERGENCY DEPT VISIT MOD MDM: CPT

## 2022-07-07 PROCEDURE — 74011250637 HC RX REV CODE- 250/637: Performed by: EMERGENCY MEDICINE

## 2022-07-07 PROCEDURE — 71046 X-RAY EXAM CHEST 2 VIEWS: CPT

## 2022-07-07 RX ORDER — IBUPROFEN 400 MG/1
800 TABLET ORAL
Status: COMPLETED | OUTPATIENT
Start: 2022-07-07 | End: 2022-07-07

## 2022-07-07 RX ORDER — METHOCARBAMOL 500 MG/1
1000 TABLET, FILM COATED ORAL ONCE
Status: COMPLETED | OUTPATIENT
Start: 2022-07-07 | End: 2022-07-07

## 2022-07-07 RX ORDER — IBUPROFEN 800 MG/1
800 TABLET ORAL
Qty: 30 TABLET | Refills: 0 | Status: SHIPPED | OUTPATIENT
Start: 2022-07-07

## 2022-07-07 RX ORDER — BACITRACIN 500 UNIT/G
1 PACKET (EA) TOPICAL ONCE
Status: COMPLETED | OUTPATIENT
Start: 2022-07-07 | End: 2022-07-07

## 2022-07-07 RX ORDER — METHOCARBAMOL 750 MG/1
750 TABLET, FILM COATED ORAL 4 TIMES DAILY
Qty: 20 TABLET | Refills: 0 | Status: SHIPPED | OUTPATIENT
Start: 2022-07-07

## 2022-07-07 RX ADMIN — METHOCARBAMOL 1000 MG: 500 TABLET ORAL at 16:37

## 2022-07-07 RX ADMIN — IBUPROFEN 800 MG: 400 TABLET, FILM COATED ORAL at 16:38

## 2022-07-07 RX ADMIN — BACITRACIN 1 PACKET: 500 OINTMENT TOPICAL at 16:38
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
## 2022-07-07 NOTE — ED NOTES
Emergency Department Nursing Plan of Care       The Nursing Plan of Care is developed from the Nursing assessment and Emergency Department Attending provider initial evaluation. The plan of care may be reviewed in the ED Provider note.     The Plan of Care was developed with the following considerations:   Patient / Family readiness to learn indicated by:verbalized understanding  Persons(s) to be included in education: patient  Barriers to Learning/Limitations:No    Signed     Marcos Marin RN    7/7/2022   4:23 PM

## 2022-07-10 LAB
ATRIAL RATE: 80 BPM
CALCULATED P AXIS, ECG09: 21 DEGREES
CALCULATED R AXIS, ECG10: -57 DEGREES
CALCULATED T AXIS, ECG11: 51 DEGREES
DIAGNOSIS, 93000: NORMAL
P-R INTERVAL, ECG05: 142 MS
Q-T INTERVAL, ECG07: 400 MS
QRS DURATION, ECG06: 102 MS
QTC CALCULATION (BEZET), ECG08: 461 MS
VENTRICULAR RATE, ECG03: 80 BPM

## 2022-07-11 ENCOUNTER — APPOINTMENT (OUTPATIENT)
Dept: CT IMAGING | Age: 67
End: 2022-07-11
Attending: STUDENT IN AN ORGANIZED HEALTH CARE EDUCATION/TRAINING PROGRAM
Payer: MEDICARE

## 2022-07-11 ENCOUNTER — HOSPITAL ENCOUNTER (EMERGENCY)
Age: 67
Discharge: HOME | End: 2022-07-11
Attending: STUDENT IN AN ORGANIZED HEALTH CARE EDUCATION/TRAINING PROGRAM
Payer: MEDICARE

## 2022-07-11 VITALS
BODY MASS INDEX: 35.78 KG/M2 | SYSTOLIC BLOOD PRESSURE: 145 MMHG | WEIGHT: 270 LBS | HEIGHT: 73 IN | OXYGEN SATURATION: 98 % | DIASTOLIC BLOOD PRESSURE: 87 MMHG | HEART RATE: 68 BPM | TEMPERATURE: 99.1 F | RESPIRATION RATE: 20 BRPM

## 2022-07-11 DIAGNOSIS — H53.8 BLURRY VISION, BILATERAL: ICD-10-CM

## 2022-07-11 DIAGNOSIS — R51.9 ACUTE NONINTRACTABLE HEADACHE, UNSPECIFIED HEADACHE TYPE: Primary | ICD-10-CM

## 2022-07-11 PROCEDURE — 74011250637 HC RX REV CODE- 250/637: Performed by: STUDENT IN AN ORGANIZED HEALTH CARE EDUCATION/TRAINING PROGRAM

## 2022-07-11 PROCEDURE — 72125 CT NECK SPINE W/O DYE: CPT

## 2022-07-11 PROCEDURE — 70450 CT HEAD/BRAIN W/O DYE: CPT

## 2022-07-11 PROCEDURE — 99284 EMERGENCY DEPT VISIT MOD MDM: CPT

## 2022-07-11 RX ORDER — BUTALBITAL, ACETAMINOPHEN AND CAFFEINE 300; 40; 50 MG/1; MG/1; MG/1
1 CAPSULE ORAL
Qty: 12 CAPSULE | Refills: 0 | Status: SHIPPED | OUTPATIENT
Start: 2022-07-11 | End: 2022-07-11 | Stop reason: SINTOL

## 2022-07-11 RX ORDER — BUTALBITAL, ACETAMINOPHEN AND CAFFEINE 50; 325; 40 MG/1; MG/1; MG/1
1 TABLET ORAL
Status: COMPLETED | OUTPATIENT
Start: 2022-07-11 | End: 2022-07-11

## 2022-07-11 RX ADMIN — BUTALBITAL, ACETAMINOPHEN, AND CAFFEINE 1 TABLET: 50; 325; 40 TABLET ORAL at 21:57

## 2022-07-12 ENCOUNTER — OFFICE VISIT (OUTPATIENT)
Dept: NEUROLOGY | Age: 67
End: 2022-07-12
Payer: MEDICARE

## 2022-07-12 VITALS
TEMPERATURE: 97.4 F | BODY MASS INDEX: 36.28 KG/M2 | HEART RATE: 74 BPM | SYSTOLIC BLOOD PRESSURE: 161 MMHG | WEIGHT: 275 LBS | OXYGEN SATURATION: 93 % | DIASTOLIC BLOOD PRESSURE: 93 MMHG

## 2022-07-12 DIAGNOSIS — G62.9 NEUROPATHY: ICD-10-CM
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
## 2022-07-12 DIAGNOSIS — F07.81 POST CONCUSSION SYNDROME: ICD-10-CM

## 2022-07-12 DIAGNOSIS — M54.2 CERVICALGIA: ICD-10-CM

## 2022-07-12 DIAGNOSIS — G31.84 MILD COGNITIVE IMPAIRMENT WITH MEMORY LOSS: ICD-10-CM

## 2022-07-12 DIAGNOSIS — M54.50 MYOFASCIAL LOW BACK PAIN: Primary | ICD-10-CM

## 2022-07-12 DIAGNOSIS — R42 DIZZINESS: ICD-10-CM

## 2022-07-12 PROCEDURE — G8432 DEP SCR NOT DOC, RNG: HCPCS | Performed by: PSYCHIATRY & NEUROLOGY

## 2022-07-12 PROCEDURE — G8755 DIAS BP > OR = 90: HCPCS | Performed by: PSYCHIATRY & NEUROLOGY

## 2022-07-12 PROCEDURE — 3017F COLORECTAL CA SCREEN DOC REV: CPT | Performed by: PSYCHIATRY & NEUROLOGY

## 2022-07-12 PROCEDURE — G8427 DOCREV CUR MEDS BY ELIG CLIN: HCPCS | Performed by: PSYCHIATRY & NEUROLOGY

## 2022-07-12 PROCEDURE — 99214 OFFICE O/P EST MOD 30 MIN: CPT | Performed by: PSYCHIATRY & NEUROLOGY

## 2022-07-12 PROCEDURE — 1101F PT FALLS ASSESS-DOCD LE1/YR: CPT | Performed by: PSYCHIATRY & NEUROLOGY

## 2022-07-12 PROCEDURE — G8536 NO DOC ELDER MAL SCRN: HCPCS | Performed by: PSYCHIATRY & NEUROLOGY

## 2022-07-12 PROCEDURE — 1123F ACP DISCUSS/DSCN MKR DOCD: CPT | Performed by: PSYCHIATRY & NEUROLOGY

## 2022-07-12 PROCEDURE — G8417 CALC BMI ABV UP PARAM F/U: HCPCS | Performed by: PSYCHIATRY & NEUROLOGY

## 2022-07-12 PROCEDURE — G8753 SYS BP > OR = 140: HCPCS | Performed by: PSYCHIATRY & NEUROLOGY
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
## 2022-07-12 NOTE — ED NOTES
Discharge instructions were given to the patient by Brittny Chery RN. The patient left the Emergency Department ambulatory, alert and oriented and in no acute distress with 0 prescriptions. The patient was encouraged to call or return to the ED for worsening issues or problems and was encouraged to schedule a follow up appointment for continuing care. The patient verbalized understanding of discharge instructions and prescriptions, all questions were answered. The patient has no further concerns at this time.         Pt to f.u with neurology tomorrow

## 2022-07-12 NOTE — PROGRESS NOTES
Chief Complaint   Patient presents with    Follow-up    Migraine    Dizziness     weak     Visit Vitals  BP (!) 161/93   Pulse 74   Temp 97.4 °F (36.3 °C)   Wt 275 lb (124.7 kg)   SpO2 93%   BMI 36.28 kg/m²
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
## 2022-08-23 ENCOUNTER — TELEPHONE (OUTPATIENT)
Dept: NEUROLOGY | Age: 67
End: 2022-08-23

## 2022-08-23 NOTE — TELEPHONE ENCOUNTER
Patient came into clinic today to [facility] on the status of his letter for disability stating his dx and was unable to work

## 2022-08-25 ENCOUNTER — APPOINTMENT (OUTPATIENT)
Dept: GENERAL RADIOLOGY | Age: 67
End: 2022-08-25
Attending: STUDENT IN AN ORGANIZED HEALTH CARE EDUCATION/TRAINING PROGRAM
Payer: MEDICARE

## 2022-08-25 ENCOUNTER — HOSPITAL ENCOUNTER (EMERGENCY)
Age: 67
Discharge: HOME | End: 2022-08-26
Attending: STUDENT IN AN ORGANIZED HEALTH CARE EDUCATION/TRAINING PROGRAM
Payer: MEDICARE

## 2022-08-25 VITALS
BODY MASS INDEX: 36.98 KG/M2 | WEIGHT: 279 LBS | SYSTOLIC BLOOD PRESSURE: 105 MMHG | DIASTOLIC BLOOD PRESSURE: 89 MMHG | RESPIRATION RATE: 20 BRPM | HEIGHT: 73 IN | OXYGEN SATURATION: 96 % | TEMPERATURE: 98.1 F | HEART RATE: 87 BPM

## 2022-08-25 DIAGNOSIS — F07.81 POST CONCUSSIVE SYNDROME: ICD-10-CM

## 2022-08-25 DIAGNOSIS — F17.200 SMOKER: ICD-10-CM

## 2022-08-25 DIAGNOSIS — J44.9 CHRONIC OBSTRUCTIVE PULMONARY DISEASE, UNSPECIFIED COPD TYPE (HCC): ICD-10-CM

## 2022-08-25 DIAGNOSIS — I49.1 PREMATURE ATRIAL CONTRACTION: Primary | ICD-10-CM

## 2022-08-25 LAB
ALBUMIN SERPL-MCNC: 3.7 G/DL (ref 3.5–5)
ALBUMIN/GLOB SERPL: 1 {RATIO} (ref 1.1–2.2)
ALP SERPL-CCNC: 78 U/L (ref 45–117)
ALT SERPL-CCNC: 23 U/L (ref 12–78)
ANION GAP SERPL CALC-SCNC: 9 MMOL/L (ref 5–15)
AST SERPL-CCNC: 10 U/L (ref 15–37)
ATRIAL RATE: 89 BPM
BASOPHILS # BLD: 0.1 K/UL (ref 0–0.1)
BASOPHILS NFR BLD: 2 % (ref 0–1)
BILIRUB SERPL-MCNC: 0.4 MG/DL (ref 0.2–1)
BNP SERPL-MCNC: 67 PG/ML (ref 0–125)
BUN SERPL-MCNC: 14 MG/DL (ref 6–20)
BUN/CREAT SERPL: 10 (ref 12–20)
CALCIUM SERPL-MCNC: 8.9 MG/DL (ref 8.5–10.1)
CALCULATED P AXIS, ECG09: 11 DEGREES
CALCULATED R AXIS, ECG10: -46 DEGREES
CALCULATED T AXIS, ECG11: 43 DEGREES
CHLORIDE SERPL-SCNC: 103 MMOL/L (ref 97–108)
CO2 SERPL-SCNC: 28 MMOL/L (ref 21–32)
CREAT SERPL-MCNC: 1.4 MG/DL (ref 0.7–1.3)
DIAGNOSIS, 93000: NORMAL
DIFFERENTIAL METHOD BLD: ABNORMAL
EOSINOPHIL # BLD: 0.2 K/UL (ref 0–0.4)
EOSINOPHIL NFR BLD: 2 % (ref 0–7)
ERYTHROCYTE [DISTWIDTH] IN BLOOD BY AUTOMATED COUNT: 13.2 % (ref 11.5–14.5)
GLOBULIN SER CALC-MCNC: 3.6 G/DL (ref 2–4)
GLUCOSE SERPL-MCNC: 107 MG/DL (ref 65–100)
HCT VFR BLD AUTO: 42.4 % (ref 36.6–50.3)
HGB BLD-MCNC: 14.6 G/DL (ref 12.1–17)
IMM GRANULOCYTES # BLD AUTO: 0 K/UL (ref 0–0.04)
IMM GRANULOCYTES NFR BLD AUTO: 0 % (ref 0–0.5)
LYMPHOCYTES # BLD: 2.3 K/UL (ref 0.8–3.5)
LYMPHOCYTES NFR BLD: 31 % (ref 12–49)
MCH RBC QN AUTO: 30.7 PG (ref 26–34)
MCHC RBC AUTO-ENTMCNC: 34.4 G/DL (ref 30–36.5)
MCV RBC AUTO: 89.1 FL (ref 80–99)
MONOCYTES # BLD: 0.8 K/UL (ref 0–1)
MONOCYTES NFR BLD: 11 % (ref 5–13)
NEUTS SEG # BLD: 4.1 K/UL (ref 1.8–8)
NEUTS SEG NFR BLD: 54 % (ref 32–75)
NRBC # BLD: 0 K/UL (ref 0–0.01)
NRBC BLD-RTO: 0 PER 100 WBC
P-R INTERVAL, ECG05: 146 MS
PLATELET # BLD AUTO: 277 K/UL (ref 150–400)
PMV BLD AUTO: 9.8 FL (ref 8.9–12.9)
POTASSIUM SERPL-SCNC: 3.7 MMOL/L (ref 3.5–5.1)
PROT SERPL-MCNC: 7.3 G/DL (ref 6.4–8.2)
Q-T INTERVAL, ECG07: 384 MS
QRS DURATION, ECG06: 102 MS
QTC CALCULATION (BEZET), ECG08: 467 MS
RBC # BLD AUTO: 4.76 M/UL (ref 4.1–5.7)
SODIUM SERPL-SCNC: 140 MMOL/L (ref 136–145)
TROPONIN-HIGH SENSITIVITY: 9 NG/L (ref 0–76)
VENTRICULAR RATE, ECG03: 89 BPM
WBC # BLD AUTO: 7.5 K/UL (ref 4.1–11.1)

## 2022-08-25 PROCEDURE — 36415 COLL VENOUS BLD VENIPUNCTURE: CPT

## 2022-08-25 PROCEDURE — 80053 COMPREHEN METABOLIC PANEL: CPT

## 2022-08-25 PROCEDURE — 71046 X-RAY EXAM CHEST 2 VIEWS: CPT

## 2022-08-25 PROCEDURE — 84484 ASSAY OF TROPONIN QUANT: CPT

## 2022-08-25 PROCEDURE — 99285 EMERGENCY DEPT VISIT HI MDM: CPT

## 2022-08-25 PROCEDURE — 74011250637 HC RX REV CODE- 250/637: Performed by: STUDENT IN AN ORGANIZED HEALTH CARE EDUCATION/TRAINING PROGRAM

## 2022-08-25 PROCEDURE — 85025 COMPLETE CBC W/AUTO DIFF WBC: CPT

## 2022-08-25 PROCEDURE — 83880 ASSAY OF NATRIURETIC PEPTIDE: CPT

## 2022-08-25 RX ORDER — ALBUTEROL SULFATE 90 UG/1
2 AEROSOL, METERED RESPIRATORY (INHALATION)
Status: COMPLETED | OUTPATIENT
Start: 2022-08-25 | End: 2022-08-25

## 2022-08-25 RX ADMIN — ALBUTEROL SULFATE 2 PUFF: 90 AEROSOL, METERED RESPIRATORY (INHALATION) at 22:50

## 2022-08-25 NOTE — ED PROVIDER NOTES
EMERGENCY DEPARTMENT HISTORY AND PHYSICAL EXAM      Date: 8/25/2022  Patient Name: Samaria Wan    History of Presenting Illness     Chief Complaint   Patient presents with    Dizziness     History Provided By: Patient    HPI: Samaria Wan, 69 y.o. male with past medical history of postconcussive syndrome, dizziness, neuropathy, chronic low back pain, chronic neck pain, hypertension, frequent headaches, presents to the ED with cc of chest tightness, racing heartbeat sensation, along with shortness of breath. Patient reports he takes several medications whenever he gets dizzy, which he frequently experiences from his post postconcussive syndrome. States that the combination of these medications can sometimes make him lethargic, and not feel normal.  He feels some of his chest tightness, shortness of breath, and racing heartbeat sensation comes from medication side effects as well. He tells me that he has had similar symptoms in the past when he has taken this combination of medications to treat his dizziness. He follows with Dr. Raine Philippe with neurology for these sxs, and tells me that he has previously mentioned his concerns regarding medication side effects to his neurologist, but is unsure if anything can be done about it. Patient tells me when he discussed his current chest tightness symptoms with his normal doctor, they advised that he should come to the ER for evaluation to make sure that his heart and lungs were okay. He denies prior history of CAD. No known history of asthma or COPD, though he does admit to several decade history of smoking. No history of CHF. He reports some baseline mild swelling in bilateral lower ankles, which has not changed. He denies exercise intolerance or STEVENS. States that he is able to bike for an hour daily without difficulty. PCP: Claudene Elm, MD    No current facility-administered medications on file prior to encounter.      Current Outpatient Medications on File Prior to Encounter   Medication Sig Dispense Refill    ibuprofen (MOTRIN) 800 mg tablet Take 1 Tablet by mouth every eight (8) hours as needed for Pain. 30 Tablet 0    methocarbamoL (ROBAXIN) 750 mg tablet Take 1 Tablet by mouth four (4) times daily. 20 Tablet 0    diclofenac EC (VOLTAREN) 75 mg EC tablet Take 1 Tablet by mouth two (2) times a day. 30 Tablet 0    predniSONE (STERAPRED) 5 mg dose pack See administration instruction per 5mg dose pack 21 Tablet 0    escitalopram oxalate (LEXAPRO) 10 mg tablet TAKE 1 TABLET BY MOUTH DAILY (Patient not taking: Reported on 7/12/2022) 30 Tablet 2    gabapentin (NEURONTIN) 300 mg capsule Take 1 Cap by mouth two (2) times a day. 60 Cap 3    rizatriptan (MAXALT-MLT) 10 mg disintegrating tablet Take 1 tablet as needed for severe headache, repeat if headache persist after 1 to 2 hours, not to exceed 2 doses in 24 hours. 12 Tab 2    mirabegron ER (Myrbetriq) 50 mg ER tablet Take 50 mg by mouth daily. potassium chloride (Klor-Con/25) 25 mEq pack Take 1 Packet by mouth daily. (Patient not taking: Reported on 7/12/2022) 7 Each 0    diazePAM (VALIUM) 10 mg tablet Take 1 tablet 1 hour before the MRI procedure. (Patient not taking: Reported on 7/12/2022) 2 Tab 0    acetaminophen-codeine (TYLENOL #3) 300-30 mg per tablet nightly. (Patient not taking: Reported on 7/12/2022)      amLODIPine (NORVASC) 5 mg tablet daily. 3    hydroCHLOROthiazide (HYDRODIURIL) 12.5 mg tablet Take 12.5 mg by mouth daily. ergocalciferol (VITAMIN D2) 50,000 unit capsule Take 50,000 Units by mouth every thirty (30) days. (Patient not taking: Reported on 7/12/2022)      acetaminophen (TYLENOL ARTHRITIS PAIN) 650 mg TbER Take 1 Tab by mouth every eight (8) hours.  (Patient not taking: Reported on 7/12/2022) 20 Tab 0       Past History     Past Medical History:  Past Medical History:   Diagnosis Date    Frequent headaches     Hypertension     Muscle pain     Neuropathy     Visual disturbance        Past Surgical History:  Past Surgical History:   Procedure Laterality Date    HX HERNIA REPAIR      times 2       Family History:  Family History   Problem Relation Age of Onset    Stroke Mother     Heart Disease Father     Neuropathy Father        Social History:  Social History     Tobacco Use    Smoking status: Some Days     Packs/day: 0.25     Types: Cigarettes    Smokeless tobacco: Never   Vaping Use    Vaping Use: Never used   Substance Use Topics    Alcohol use: No    Drug use: Yes     Types: Cocaine     Comment: per pt last used crack/cocaine 7/1/22        Allergies:  No Known Allergies      Review of Systems   Review of Systems   Constitutional:  Negative for chills and fever. HENT:  Negative for congestion and rhinorrhea. Eyes:  Negative for visual disturbance. Respiratory:  Positive for chest tightness and shortness of breath. Negative for wheezing. Cardiovascular:  Positive for palpitations. Negative for chest pain and leg swelling. Gastrointestinal:  Negative for abdominal pain, diarrhea, nausea and vomiting. Genitourinary:  Negative for dysuria, flank pain and hematuria. Musculoskeletal:  Negative for back pain and neck pain. Skin:  Negative for rash. Allergic/Immunologic: Negative for immunocompromised state. Neurological:  Negative for dizziness, speech difficulty, weakness and headaches. Hematological:  Negative for adenopathy. Psychiatric/Behavioral:  Negative for dysphoric mood and suicidal ideas. Physical Exam   Physical Exam  Vitals and nursing note reviewed. Constitutional:       General: He is not in acute distress. Appearance: Normal appearance. He is not ill-appearing or toxic-appearing. HENT:      Head: Normocephalic and atraumatic. Nose: Nose normal.      Mouth/Throat:      Mouth: Mucous membranes are moist.   Eyes:      Extraocular Movements: Extraocular movements intact. Pupils: Pupils are equal, round, and reactive to light.    Cardiovascular: Rate and Rhythm: Normal rate. Rhythm irregular. Pulses: Normal pulses. Pulmonary:      Effort: Pulmonary effort is normal. No respiratory distress. Breath sounds: Decreased air movement present. No stridor. Decreased breath sounds present. No wheezing or rhonchi. Abdominal:      General: Abdomen is flat. There is no distension. Palpations: There is no mass. Tenderness: There is no abdominal tenderness. Musculoskeletal:         General: Normal range of motion. Cervical back: Normal range of motion and neck supple. Right lower leg: No edema. Left lower leg: No edema. Skin:     General: Skin is warm and dry. Neurological:      General: No focal deficit present. Mental Status: He is alert. Mental status is at baseline. Sensory: No sensory deficit. Motor: No weakness.        Diagnostic Study Results     Labs -     Recent Results (from the past 124 hour(s))   EKG, 12 LEAD, INITIAL    Collection Time: 08/25/22  8:48 PM   Result Value Ref Range    Ventricular Rate 89 BPM    Atrial Rate 89 BPM    P-R Interval 146 ms    QRS Duration 102 ms    Q-T Interval 384 ms    QTC Calculation (Bezet) 467 ms    Calculated P Axis 11 degrees    Calculated R Axis -46 degrees    Calculated T Axis 43 degrees    Diagnosis       Sinus rhythm with premature atrial complexes  Left anterior fascicular block  Nonspecific T wave abnormality  Prolonged QT  Abnormal ECG  When compared with ECG of 07-JUL-2022 16:16,  No significant change was found     CBC WITH AUTOMATED DIFF    Collection Time: 08/25/22 10:36 PM   Result Value Ref Range    WBC 7.5 4.1 - 11.1 K/uL    RBC 4.76 4.10 - 5.70 M/uL    HGB 14.6 12.1 - 17.0 g/dL    HCT 42.4 36.6 - 50.3 %    MCV 89.1 80.0 - 99.0 FL    MCH 30.7 26.0 - 34.0 PG    MCHC 34.4 30.0 - 36.5 g/dL    RDW 13.2 11.5 - 14.5 %    PLATELET 372 617 - 297 K/uL    MPV 9.8 8.9 - 12.9 FL    NRBC 0.0 0  WBC    ABSOLUTE NRBC 0.00 0.00 - 0.01 K/uL    NEUTROPHILS 54 32 - 75 %    LYMPHOCYTES 31 12 - 49 %    MONOCYTES 11 5 - 13 %    EOSINOPHILS 2 0 - 7 %    BASOPHILS 2 (H) 0 - 1 %    IMMATURE GRANULOCYTES 0 0.0 - 0.5 %    ABS. NEUTROPHILS 4.1 1.8 - 8.0 K/UL    ABS. LYMPHOCYTES 2.3 0.8 - 3.5 K/UL    ABS. MONOCYTES 0.8 0.0 - 1.0 K/UL    ABS. EOSINOPHILS 0.2 0.0 - 0.4 K/UL    ABS. BASOPHILS 0.1 0.0 - 0.1 K/UL    ABS. IMM. GRANS. 0.0 0.00 - 0.04 K/UL    DF AUTOMATED     METABOLIC PANEL, COMPREHENSIVE    Collection Time: 08/25/22 10:36 PM   Result Value Ref Range    Sodium 140 136 - 145 mmol/L    Potassium 3.7 3.5 - 5.1 mmol/L    Chloride 103 97 - 108 mmol/L    CO2 28 21 - 32 mmol/L    Anion gap 9 5 - 15 mmol/L    Glucose 107 (H) 65 - 100 mg/dL    BUN 14 6 - 20 MG/DL    Creatinine 1.40 (H) 0.70 - 1.30 MG/DL    BUN/Creatinine ratio 10 (L) 12 - 20      GFR est AA >60 >60 ml/min/1.73m2    GFR est non-AA 50 (L) >60 ml/min/1.73m2    Calcium 8.9 8.5 - 10.1 MG/DL    Bilirubin, total 0.4 0.2 - 1.0 MG/DL    ALT (SGPT) 23 12 - 78 U/L    AST (SGOT) 10 (L) 15 - 37 U/L    Alk. phosphatase 78 45 - 117 U/L    Protein, total 7.3 6.4 - 8.2 g/dL    Albumin 3.7 3.5 - 5.0 g/dL    Globulin 3.6 2.0 - 4.0 g/dL    A-G Ratio 1.0 (L) 1.1 - 2.2     TROPONIN-HIGH SENSITIVITY    Collection Time: 08/25/22 10:36 PM   Result Value Ref Range    Troponin-High Sensitivity 9 0 - 76 ng/L   NT-PRO BNP    Collection Time: 08/25/22 10:36 PM   Result Value Ref Range    NT pro-BNP 67 0 - 125 PG/ML       Radiologic Studies -   XR CHEST PA LAT   Final Result   No acute findings. COPD. CT Results  (Last 48 hours)      None          CXR Results  (Last 48 hours)                 08/25/22 2034  XR CHEST PA LAT Final result    Impression:  No acute findings. COPD. Narrative:  EXAM: XR CHEST PA LAT       INDICATION: Chest Pain       COMPARISON: Chest x-ray 7/7/2022. FINDINGS: PA and lateral radiographs of the chest demonstrate hyperinflated but   otherwise clear lungs.  The cardiac and mediastinal contours and pulmonary   vascularity are normal. The bones and soft tissues are within normal limits. Medical Decision Making   I, Pepe Mondragon MD am the first provider for this patient, and I am the attending of record for this patient encounter. I reviewed the vital signs, available nursing notes, past medical history, past surgical history, family history and social history. Vital Signs-Reviewed the patient's vital signs. Patient Vitals for the past 24 hrs:   Temp Pulse Resp BP SpO2   08/25/22 1832 98.1 °F (36.7 °C) 88 18 (!) 135/96 99 %       EKG interpretation: (Preliminary)  Sinus rhythm with PACs. Nonspecific ST changes. No significant change when compared to previous EKG. EKG interpretation by Pepe Mondragon MD    Records Reviewed: Nursing Notes and Old Medical Records    Provider Notes (Medical Decision Making):   Ddx: asthma/copd, paroxysmal symptomatic arrhythmia, ACS, anxiety, dehydration, pneumonia, medication side effect, CHF, etc.    Plan: EKG, troponin, proBNP, CBC, CMP, chest x-ray. EKG show frequent PACs, no STEMI. Labs largely reassuring. Chest x-ray without acute process, but does show findings consistent with COPD. Results of work-up today discussed with the patient. He was given an albuterol inhaler to try in the ED, which he feels noticeably helped with his symptoms. Suspect patient likely has undiagnosed COPD, which is likely contributing to some of his shortness of breath sensations. Though he does not appear to be in respiratory distress, and I do not feel he is in COPD exacerbation. We will dispense albuterol inhaler for patient's home use should symptoms recur. Smoking cessation was reviewed with the patient. Will refer to pulmonology for ongoing management. In terms of his PACs that are rather frequent, this may also be contributing to some of his symptomatic palpitations. There is no evidence of acute cardiac ischemia.   Though I feel he would benefit from dedicated cardiology evaluation with Holter monitoring and echocardiogram, etc.  Will refer to cardiology for ongoing assessment. Patient feeling better at this time, will plan to discharge to follow-up with PCP and relevant specialists. All of his questions were answered to his full satisfaction. Return precautions discussed. Tobacco Counseling  Discussed the risks of smoking and the benefits of smoking cessation as well as the long term sequelae of smoking with the patient. The patient was advised to quit. Reviewed strategies to maximize success, including removing cigarettes and smoking materials from environment, stress management, substitution of other forms of reinforcement and support of family/friends. The patient verbalized their understanding. Counseled patient for approximately 3 minutes. ED Course:   Initial assessment performed. The patient's presenting problems have been discussed, and they are in agreement with the care plan formulated and outlined with them. I have encouraged them to ask questions as they arise throughout their visit. Lalo Snyder MD      Disposition:  DC      DISCHARGE PLAN:  1. Discharge Medication List as of 8/25/2022 11:49 PM        2. Follow-up Information       Follow up With Specialties Details Why Contact Info    Ronaldo Bryan MD Pulmonary Disease Schedule an appointment as soon as possible for a visit   [phone] [facility]  [facility]  [address]  [facility] [address].  [phone]      [facility]  Schedule an appointment as soon as possible for a visit   [address]  Schedule an appointment as soon as possible for a visit   [address] [facility]  [address]          3. Return to ED if worse     Diagnosis     Clinical Impression:   1. Premature atrial contraction    2.  Chronic obstructive pulmonary disease, unspecified COPD type ([facility] [address]    3. Post concussive syndrome    4. Smoker        Attestations:    Nando Mosley MD    Please note that this dictation was completed with [employer], the computer voice recognition software. Quite often unanticipated grammatical, syntax, homophones, and other interpretive errors are inadvertently transcribed by the computer software. Please disregard these errors. Please excuse any errors that have escaped final proofreading. Thank you.

## 2022-08-25 NOTE — TELEPHONE ENCOUNTER
Called pt. Used 2 patient identifiers. Advised pt that he must first submit completed disability paperwork to Dr. Tristan Closs in order for Dr. Tristan Closs to send a letter for disability benefits. Pt was advised to bring paperwork to [facility] on either a Tuesday or a Thursday. Pt indicated understanding.

## 2022-08-25 NOTE — ED TRIAGE NOTES
C/o dizziness and rapid heart rate that started around 1500 this afternoon. Pt states he believes it his medications that are causing these symptoms.
Discharged

## 2022-08-26 NOTE — ED NOTES
Pt presents to ED complaining of Dizziness. Pt reports about 4pm today he felt dizzy, he also felt like his heart was racing and his breathing was labored. Pt reports that all the symptoms have since resolved but he wanted to come in to get checked out. Pt is alert and oriented x 4, RR even and unlabored, skin is warm and dry. Assessment completed and pt updated on plan of care. Call bell in reach. Emergency Department Nursing Plan of Care       The Nursing Plan of Care is developed from the Nursing assessment and Emergency Department Attending provider initial evaluation. The plan of care may be reviewed in the ED Provider note.     The Plan of Care was developed with the following considerations:   Patient / Family readiness to learn indicated by:verbalized understanding  Persons(s) to be included in education: patient  Barriers to Learning/Limitations:No    Signed     Precious Lafleur RN    8/25/2022

## 2022-08-26 NOTE — ED NOTES
Pt would only allow for his blood to be drawn in his L hand only, 1 stick was attempted in his L hand but was unsuccessful and pt is now refusing to have any blood work done. Dr. Angelic Huitron was made aware.

## 2022-08-26 NOTE — ED NOTES
Discharge instructions were given to the patient by Melina Reynolds RN  . The patient left the Emergency Department ambulatory, alert and oriented and in no acute distress with 0 prescriptions. The patient was encouraged to call or return to the ED for worsening issues or problems and was encouraged to schedule a follow up appointment for continuing care. The patient verbalized understanding of discharge instructions and prescriptions, all questions were answered. The patient has no further concerns at this time.

## 2022-09-20 ENCOUNTER — DOCUMENTATION ONLY (OUTPATIENT)
Dept: NEUROLOGY | Age: 67
End: 2022-09-20

## 2022-10-24 ENCOUNTER — TELEPHONE (OUTPATIENT)
Dept: NEUROLOGY | Age: 67
End: 2022-10-24

## 2022-10-24 NOTE — TELEPHONE ENCOUNTER
VOICEMAIL    Pt states he has an interview with [employer] on Friday about his disabilty benefits. He needs a letter from us with the DX that caused him to file for disability. He would like to  letter this Thursday. Please call with questions.  [phone]

## 2022-10-26 ENCOUNTER — TELEPHONE (OUTPATIENT)
Dept: NEUROLOGY | Age: 67
End: 2022-10-26

## 2022-10-26 NOTE — TELEPHONE ENCOUNTER
VOICEMAIL    Shira w/ [facility] called to find out if we received the fax they sent about pt's medications.  Please call [phone]

## 2022-10-28 NOTE — TELEPHONE ENCOUNTER
Spoke with Shira @ [facility]. Requested that the information be re faxed to our [facility] office.

## 2022-11-10 ENCOUNTER — TELEPHONE (OUTPATIENT)
Dept: NEUROLOGY | Age: 67
End: 2022-11-10

## 2022-11-10 NOTE — TELEPHONE ENCOUNTER
Call placed to [address] [place] [address]. Spoke with Kandi Paez regarding patient ( received). Letty Jean that our office continues to receive notification from [address] [place] [address] that Dr. Reid Allen needs to sign form to transfer medication to [facility]. Letty Jean that Dr. Reid Allen has signed form and it has been faxed multiple times to [facility]. Kandi Paez acknowledged confirmation of said form. Kandi Paez stated he would make sure this issue is taken care of.for our office.

## 2023-04-06 ENCOUNTER — HOSPITAL ENCOUNTER (OUTPATIENT)
Age: 68
End: 2023-04-06
Attending: EMERGENCY MEDICINE
Payer: MEDICARE

## 2023-04-06 ENCOUNTER — APPOINTMENT (OUTPATIENT)
Dept: GENERAL RADIOLOGY | Age: 68
End: 2023-04-06
Attending: EMERGENCY MEDICINE
Payer: MEDICARE

## 2023-04-06 PROCEDURE — 71046 X-RAY EXAM CHEST 2 VIEWS: CPT

## 2023-04-06 PROCEDURE — 77030029684 HC NEB SM VOL KT MONA -A

## 2023-04-06 PROCEDURE — 94640 AIRWAY INHALATION TREATMENT: CPT

## 2023-04-06 PROCEDURE — 74011000250 HC RX REV CODE- 250: Performed by: EMERGENCY MEDICINE

## 2023-04-06 RX ADMIN — IPRATROPIUM BROMIDE AND ALBUTEROL SULFATE 3 ML: 2.5; .5 SOLUTION RESPIRATORY (INHALATION) at 21:47

## 2023-04-07 NOTE — DISCHARGE INSTRUCTIONS
You are seen in the emergency department for your symptoms. Please follow-up with your primary doctor. Please use the nebulizer as needed. Please return for new or worsening symptoms anytime.

## 2023-04-07 NOTE — ED PROVIDER NOTES
[facility] EMERGENCY DEPT  EMERGENCY DEPARTMENT ENCOUNTER       Pt Name: Kamran Mcdonough  MRN: [id number]  [place] 1955  Date of evaluation: 4/6/2023  Provider: Dc Naylor MD   PCP: Myron Gaming MD  Note Started: 9:28 PM 4/6/23     CHIEF COMPLAINT       Chief Complaint   Patient presents with    Shortness of Breath     Pt reports SOB this evening, pt reports he was moving furniture when it started. History of COPD. Denies any oxygen use at home         HISTORY OF PRESENT ILLNESS: 1 or more elements      History From: patient, History limited by: none     Kamran Mcdonough is a 77 y.o. male with a history of COPD presents emerged department chief plaint of shortness of breath. Patient reports he was busy moving furniture all day. He reports as a result of this he feels a \"fullness\" in his neck and a feeling as though he cannot get his [place] in.\"  He reports some chest tightness. No cough. Does report that he has a history of COPD and is requesting a nebulizer. Denies chest pain. Denies abdominal pain, nausea, vomiting or diarrhea. Denies leg swelling. No history of CAD. Please See MDM for Additional Details of the HPI/PMH  Nursing Notes were all reviewed and agreed with or any disagreements were addressed in the HPI. REVIEW OF SYSTEMS        Positives and Pertinent negatives as per HPI.     PAST HISTORY     Past Medical History:  Past Medical History:   Diagnosis Date    Frequent headaches     Hypertension     Muscle pain     Neuropathy     Visual disturbance        Past Surgical History:  Past Surgical History:   Procedure Laterality Date    HX HERNIA REPAIR      times 2       Family History:  Family History   Problem Relation Age of Onset    Stroke Mother     Heart Disease Father     Neuropathy Father        Social History:  Social History     Tobacco Use    Smoking status: Some Days     Packs/day: 0.25     Types: Cigarettes    Smokeless tobacco: Never   Vaping Use    Vaping Use: Never used Substance Use Topics    Alcohol use: No    Drug use: Yes     Types: Cocaine     Comment: per pt last used crack/cocaine 8/20/22       Allergies:  No Known Allergies    CURRENT MEDICATIONS      Previous Medications    ACETAMINOPHEN (TYLENOL ARTHRITIS PAIN) 650 MG TBER    Take 1 Tab by mouth every eight (8) hours. ACETAMINOPHEN-CODEINE (TYLENOL #3) 300-30 MG PER TABLET    nightly. AMLODIPINE (NORVASC) 5 MG TABLET    daily. DIAZEPAM (VALIUM) 10 MG TABLET    Take 1 tablet 1 hour before the MRI procedure. DICLOFENAC EC (VOLTAREN) 75 MG EC TABLET    Take 1 Tablet by mouth two (2) times a day. ERGOCALCIFEROL (ERGOCALCIFEROL) 1,250 MCG (50,000 UNIT) CAPSULE    Take 50,000 Units by mouth every thirty (30) days. ESCITALOPRAM OXALATE (LEXAPRO) 10 MG TABLET    TAKE 1 TABLET BY MOUTH DAILY    GABAPENTIN (NEURONTIN) 300 MG CAPSULE    Take 1 Cap by mouth two (2) times a day. HYDROCHLOROTHIAZIDE (HYDRODIURIL) 12.5 MG TABLET    Take 12.5 mg by mouth daily. IBUPROFEN (MOTRIN) 800 MG TABLET    Take 1 Tablet by mouth every eight (8) hours as needed for Pain. METHOCARBAMOL (ROBAXIN) 750 MG TABLET    Take 1 Tablet by mouth four (4) times daily. MIRABEGRON ER (MYRBETRIQ) 50 MG ER TABLET    Take 50 mg by mouth daily. POTASSIUM CHLORIDE (KLOR-CON/25) 25 MEQ PACK    Take 1 Packet by mouth daily. PREDNISONE (STERAPRED) 5 MG DOSE PACK    See administration instruction per 5mg dose pack    RIZATRIPTAN (MAXALT-MLT) 10 MG DISINTEGRATING TABLET    Take 1 tablet as needed for severe headache, repeat if headache persist after 1 to 2 hours, not to exceed 2 doses in 24 hours.        SCREENINGS               No data recorded         PHYSICAL EXAM      ED Triage Vitals [04/06/23 2107]   ED Encounter Vitals Group      /83      Pulse (Heart Rate) 64      Resp Rate 22      Temp 98.3 °F (36.8 °C)      Temp src       O2 Sat (%) 95 %      Weight 222 lb      Height 5' 11.5\"        Physical Exam  Vitals and nursing note reviewed. Constitutional:       Comments: 26-year-old male, resting bed, no acute distress   HENT:      Head: Normocephalic. Cardiovascular:      Rate and Rhythm: Normal rate and regular rhythm. Comments: 2+ radial pulses bilaterally  Pulmonary:      Effort: Pulmonary effort is normal. No tachypnea. Breath sounds: Examination of the right-upper field reveals wheezing. Examination of the left-upper field reveals wheezing. Examination of the right-middle field reveals wheezing. Examination of the left-middle field reveals wheezing. Wheezing present. Abdominal:      General: Abdomen is flat. Tenderness: There is no abdominal tenderness. Musculoskeletal:         General: Normal range of motion. Right lower leg: No edema. Left lower leg: No edema. Skin:     General: Skin is warm. Neurological:      General: No focal deficit present. Mental Status: He is alert. Psychiatric:         Mood and Affect: Mood normal.        DIAGNOSTIC RESULTS   LABS:     No results found for this or any previous visit (from the past 12 hour(s)). EKG: If performed, independent interpretation documented below in the MDM section     RADIOLOGY:  Non-plain film images such as CT, Ultrasound and MRI are read by the radiologist. Plain radiographic images are visualized and preliminarily interpreted by the ED Provider with the findings documented in the MDM section. Interpretation per the Radiologist below, if available at the time of this note:     No results found.       PROCEDURES   Unless otherwise noted below, none  Procedures     CRITICAL CARE TIME   ***    EMERGENCY DEPARTMENT COURSE and DIFFERENTIAL DIAGNOSIS/MDM   Vitals:    Vitals:    04/06/23 2107   BP: 112/83   Pulse: 64   Resp: 22   Temp: 98.3 °F (36.8 °C)   SpO2: 95%   Weight: 100.7 kg (222 lb)   Height: 5' 11.5\" (1.816 m)        Patient was given the following medications:  Medications   albuterol-ipratropium (DUO-NEB) 2.5 MG-0.5 MG/3 ML (has no administration in time range)       Medical Decision Making  7year-old male presents emerged department chief plaint of shortness of breath. Vital signs are unremarkable. Patient does have some faint wheezing on exam, primarily suspect COPD exacerbation. Will give nebulizer. Will check plain film to exclude pneumothorax or infiltrate. Patient does report chest \"tightness\" associate with a globus sensation. Low suspicion for ACS but will check a EKG and troponin as well as basic labs to exclude this. Anticipate patient be discharged if ED work-up is unremarkable. Amount and/or Complexity of Data Reviewed  Labs: ordered. Decision-making details documented in ED Course. Radiology: ordered and independent interpretation performed. Decision-making details documented in ED Course. ECG/medicine tests: ordered and independent interpretation performed. Decision-making details documented in ED Course. Risk  Prescription drug management. FINAL IMPRESSION   No diagnosis found. DISPOSITION/PLAN   Obi Manjarrez's  results have been reviewed with him. He has been counseled regarding his diagnosis, treatment, and plan. He verbally conveys understanding and agreement of the signs, symptoms, diagnosis, treatment and prognosis and additionally agrees to follow up as discussed. He also agrees with the care-plan and conveys that all of his questions have been answered. I have also provided discharge instructions for him that include: educational information regarding their diagnosis and treatment, and list of reasons why they would want to return to the ED prior to their follow-up appointment, should his condition change.      CLINICAL IMPRESSION    {Disposition:75959}     PATIENT REFERRED TO:  Follow-up Information    None           DISCHARGE MEDICATIONS:  Current Discharge Medication List            DISCONTINUED MEDICATIONS:  Current Discharge Medication List          I am the Primary Clinician of Record. Dc Naylor MD (electronically signed)    (Please note that parts of this dictation were completed with voice recognition software. Quite often unanticipated grammatical, syntax, homophones, and other interpretive errors are inadvertently transcribed by the computer software. Please disregards these errors.  Please excuse any errors that have escaped final proofreading.)

## 2023-04-07 NOTE — ED NOTES
Pt called out and reports SOB. Assessed pt, noted pt has irregular HR with many PVCs noted. Pt 98% on RA. Pt HOB adjusted to greater than 30 degrees. Pt respirations are regular rate, depth. Pt does not appear to be in acute distress at this time. Will continue to monitor. Primary RN and MD made aware.

## 2023-04-07 NOTE — ED NOTES
Emergency Department Nursing Plan of Care      The Nursing Plan of Care is developed from the Nursing assessment and Emergency Department Attending provider initial evaluation. The plan of care may be reviewed in the \"ED Provider note\".     The Plan of Care was developed with the following considerations:   Patient / Family readiness to learn indicated by: verbalized understanding  Persons(s) to be included in education: patient  Barriers to Learning/Limitations:none    Signed    Fawn Del Rio RN    4/6/2023  3948

## 2023-04-07 NOTE — ED NOTES
DISCHARGE INSTRUCTIONS    Patient given discharge instructions regarding admitting diagnosis. A total of 0 written and 2 electronic prescriptions were given and discussed with patient. Education re: indication, frequency, and route of prescribed meds given. Patient was provided medication reconciliation, follow up appointments, and educational print out related to medical condition. Work/school note not provided  Patient is stable and medically cleared to go home. IV removed, catheter intact. Belongings gathered by patient. Wheelchair was offered. Patient refused wheelchair.

## 2023-07-18 ENCOUNTER — HOSPITAL ENCOUNTER (INPATIENT)
Facility: HOSPITAL | Age: 68
LOS: 2 days | Discharge: HOME | DRG: 103 | End: 2023-07-20
Attending: EMERGENCY MEDICINE | Admitting: INTERNAL MEDICINE
Payer: MEDICARE

## 2023-07-18 ENCOUNTER — APPOINTMENT (OUTPATIENT)
Facility: HOSPITAL | Age: 68
DRG: 103 | End: 2023-07-18
Attending: INTERNAL MEDICINE
Payer: MEDICARE

## 2023-07-18 ENCOUNTER — APPOINTMENT (OUTPATIENT)
Facility: HOSPITAL | Age: 68
DRG: 103 | End: 2023-07-18
Payer: MEDICARE

## 2023-07-18 DIAGNOSIS — M54.31 SCIATICA OF RIGHT SIDE: ICD-10-CM

## 2023-07-18 DIAGNOSIS — R26.81 GAIT INSTABILITY: ICD-10-CM

## 2023-07-18 DIAGNOSIS — I63.9 ACUTE CVA (CEREBROVASCULAR ACCIDENT) (HCC): Primary | ICD-10-CM

## 2023-07-18 DIAGNOSIS — E87.6 ACUTE HYPOKALEMIA: ICD-10-CM

## 2023-07-18 DIAGNOSIS — N17.9 AKI (ACUTE KIDNEY INJURY) (HCC): ICD-10-CM

## 2023-07-18 DIAGNOSIS — R47.01 EXPRESSIVE APHASIA: ICD-10-CM

## 2023-07-18 DIAGNOSIS — R42 POSTURAL DIZZINESS: ICD-10-CM

## 2023-07-18 DIAGNOSIS — F14.10 COCAINE ABUSE (HCC): ICD-10-CM

## 2023-07-18 LAB
ALBUMIN SERPL-MCNC: 4.2 G/DL (ref 3.5–5)
ALBUMIN/GLOB SERPL: 1.1 (ref 1.1–2.2)
ALP SERPL-CCNC: 77 U/L (ref 45–117)
ALT SERPL-CCNC: 25 U/L (ref 12–78)
AMPHET UR QL SCN: NEGATIVE
ANION GAP SERPL CALC-SCNC: 12 MMOL/L (ref 5–15)
ANION GAP SERPL CALC-SCNC: 9 MMOL/L (ref 5–15)
APPEARANCE UR: CLEAR
AST SERPL-CCNC: 22 U/L (ref 15–37)
BACTERIA URNS QL MICRO: NEGATIVE /HPF
BARBITURATES UR QL SCN: NEGATIVE
BASOPHILS # BLD: 0.1 K/UL (ref 0–0.1)
BASOPHILS NFR BLD: 1 % (ref 0–1)
BENZODIAZ UR QL: NEGATIVE
BILIRUB SERPL-MCNC: 0.8 MG/DL (ref 0.2–1)
BILIRUB UR QL: NEGATIVE
BUN SERPL-MCNC: 20 MG/DL (ref 6–20)
BUN SERPL-MCNC: 25 MG/DL (ref 6–20)
BUN/CREAT SERPL: 13 (ref 12–20)
BUN/CREAT SERPL: 9 (ref 12–20)
CALCIUM SERPL-MCNC: 8.9 MG/DL (ref 8.5–10.1)
CALCIUM SERPL-MCNC: 9.7 MG/DL (ref 8.5–10.1)
CANNABINOIDS UR QL SCN: NEGATIVE
CHLORIDE SERPL-SCNC: 100 MMOL/L (ref 97–108)
CHLORIDE SERPL-SCNC: 104 MMOL/L (ref 97–108)
CHOLEST SERPL-MCNC: 165 MG/DL
CO2 SERPL-SCNC: 27 MMOL/L (ref 21–32)
CO2 SERPL-SCNC: 31 MMOL/L (ref 21–32)
COCAINE UR QL SCN: POSITIVE
COLOR UR: ABNORMAL
COMMENT:: NORMAL
CREAT SERPL-MCNC: 1.93 MG/DL (ref 0.7–1.3)
CREAT SERPL-MCNC: 2.21 MG/DL (ref 0.7–1.3)
CRP SERPL-MCNC: 0.37 MG/DL (ref 0–0.6)
DIFFERENTIAL METHOD BLD: NORMAL
ECHO AO ROOT DIAM: 3 CM
ECHO AO ROOT INDEX: 1.28 CM/M2
ECHO AV AREA PEAK VELOCITY: 3.2 CM2
ECHO AV AREA PLAN/BSA: 1.28 CM2/M2
ECHO AV AREA PLAN: 3 CM2
ECHO AV AREA/BSA PEAK VELOCITY: 1.4 CM2/M2
ECHO AV MEAN GRADIENT: 3 MMHG
ECHO AV MEAN VELOCITY: 0.8 M/S
ECHO AV PEAK GRADIENT: 6 MMHG
ECHO AV PEAK VELOCITY: 1.2 M/S
ECHO AV VELOCITY RATIO: 0.83
ECHO AV VTI: 23.9 CM
ECHO BSA: 2.31 M2
ECHO EST RA PRESSURE: 5 MMHG
ECHO LA DIAMETER INDEX: 1.53 CM/M2
ECHO LA DIAMETER: 3.6 CM
ECHO LA TO AORTIC ROOT RATIO: 1.2
ECHO LA VOL 4C: 49 ML (ref 18–58)
ECHO LA VOLUME INDEX A4C: 21 ML/M2 (ref 16–34)
ECHO LV EDV A4C: 180 ML
ECHO LV EDV INDEX A4C: 77 ML/M2
ECHO LV EJECTION FRACTION A4C: 69 %
ECHO LV ESV A4C: 55 ML
ECHO LV ESV INDEX A4C: 23 ML/M2
ECHO LV FRACTIONAL SHORTENING: 27 % (ref 28–44)
ECHO LV INTERNAL DIMENSION DIASTOLE INDEX: 2.34 CM/M2
ECHO LV INTERNAL DIMENSION DIASTOLIC: 5.5 CM (ref 4.2–5.9)
ECHO LV INTERNAL DIMENSION SYSTOLIC INDEX: 1.7 CM/M2
ECHO LV INTERNAL DIMENSION SYSTOLIC: 4 CM
ECHO LV IVSD: 1.2 CM (ref 0.6–1)
ECHO LV MASS 2D: 227.4 G (ref 88–224)
ECHO LV MASS INDEX 2D: 96.8 G/M2 (ref 49–115)
ECHO LV POSTERIOR WALL DIASTOLIC: 0.9 CM (ref 0.6–1)
ECHO LV RELATIVE WALL THICKNESS RATIO: 0.33
ECHO LVOT AREA: 4.2 CM2
ECHO LVOT DIAM: 2.3 CM
ECHO LVOT PEAK GRADIENT: 4 MMHG
ECHO LVOT PEAK VELOCITY: 1 M/S
ECHO MV A VELOCITY: 0.77 M/S
ECHO MV AREA PHT: 3.4 CM2
ECHO MV E DECELERATION TIME (DT): 224.5 MS
ECHO MV E VELOCITY: 0.35 M/S
ECHO MV E/A RATIO: 0.45
ECHO MV MAX VELOCITY: 1 M/S
ECHO MV MEAN GRADIENT: 1 MMHG
ECHO MV MEAN VELOCITY: 0.4 M/S
ECHO MV PEAK GRADIENT: 4 MMHG
ECHO MV PRESSURE HALF TIME (PHT): 65.1 MS
ECHO MV VTI: 23.1 CM
ECHO PULMONARY ARTERY END DIASTOLIC PRESSURE: 16 MMHG
ECHO PV MAX VELOCITY: 0.7 M/S
ECHO PV PEAK GRADIENT: 2 MMHG
ECHO RIGHT VENTRICULAR SYSTOLIC PRESSURE (RVSP): 39 MMHG
ECHO TV REGURGITANT MAX VELOCITY: 2.9 M/S
ECHO TV REGURGITANT PEAK GRADIENT: 34 MMHG
EOSINOPHIL # BLD: 0.1 K/UL (ref 0–0.4)
EOSINOPHIL NFR BLD: 1 % (ref 0–7)
EPITH CASTS URNS QL MICRO: ABNORMAL /LPF
ERYTHROCYTE [DISTWIDTH] IN BLOOD BY AUTOMATED COUNT: 13.1 % (ref 11.5–14.5)
EST. AVERAGE GLUCOSE BLD GHB EST-MCNC: 120 MG/DL
ETHANOL SERPL-MCNC: <10 MG/DL (ref 0–0.08)
FLUAV RNA SPEC QL NAA+PROBE: NOT DETECTED
FLUBV RNA SPEC QL NAA+PROBE: NOT DETECTED
FOLATE SERPL-MCNC: 15.2 NG/ML (ref 5–21)
GLOBULIN SER CALC-MCNC: 3.8 G/DL (ref 2–4)
GLUCOSE BLD STRIP.AUTO-MCNC: 121 MG/DL (ref 65–117)
GLUCOSE SERPL-MCNC: 114 MG/DL (ref 65–100)
GLUCOSE SERPL-MCNC: 125 MG/DL (ref 65–100)
GLUCOSE UR STRIP.AUTO-MCNC: NEGATIVE MG/DL
HBA1C MFR BLD: 5.8 % (ref 4–5.6)
HCT VFR BLD AUTO: 44.8 % (ref 36.6–50.3)
HDLC SERPL-MCNC: 51 MG/DL
HDLC SERPL: 3.2 (ref 0–5)
HGB BLD-MCNC: 15.6 G/DL (ref 12.1–17)
HGB UR QL STRIP: NEGATIVE
IMM GRANULOCYTES # BLD AUTO: 0 K/UL (ref 0–0.04)
IMM GRANULOCYTES NFR BLD AUTO: 0 % (ref 0–0.5)
INR PPP: 1.2 (ref 0.9–1.1)
KETONES UR QL STRIP.AUTO: ABNORMAL MG/DL
LDLC SERPL CALC-MCNC: 78.6 MG/DL (ref 0–100)
LEUKOCYTE ESTERASE UR QL STRIP.AUTO: NEGATIVE
LYMPHOCYTES # BLD: 1.9 K/UL (ref 0.8–3.5)
LYMPHOCYTES NFR BLD: 19 % (ref 12–49)
Lab: ABNORMAL
MAGNESIUM SERPL-MCNC: 1.9 MG/DL (ref 1.6–2.4)
MAGNESIUM SERPL-MCNC: 1.9 MG/DL (ref 1.6–2.4)
MCH RBC QN AUTO: 30.2 PG (ref 26–34)
MCHC RBC AUTO-ENTMCNC: 34.8 G/DL (ref 30–36.5)
MCV RBC AUTO: 86.7 FL (ref 80–99)
METHADONE UR QL: NEGATIVE
MONOCYTES # BLD: 1 K/UL (ref 0–1)
MONOCYTES NFR BLD: 9 % (ref 5–13)
NEUTS SEG # BLD: 7 K/UL (ref 1.8–8)
NEUTS SEG NFR BLD: 70 % (ref 32–75)
NITRITE UR QL STRIP.AUTO: NEGATIVE
NRBC # BLD: 0 K/UL (ref 0–0.01)
NRBC BLD-RTO: 0 PER 100 WBC
OPIATES UR QL: NEGATIVE
PCP UR QL: NEGATIVE
PH UR STRIP: 5 (ref 5–8)
PHOSPHATE SERPL-MCNC: 4.3 MG/DL (ref 2.6–4.7)
PLATELET # BLD AUTO: 326 K/UL (ref 150–400)
PMV BLD AUTO: 9.8 FL (ref 8.9–12.9)
POTASSIUM SERPL-SCNC: 3 MMOL/L (ref 3.5–5.1)
POTASSIUM SERPL-SCNC: 3.1 MMOL/L (ref 3.5–5.1)
PROT SERPL-MCNC: 8 G/DL (ref 6.4–8.2)
PROT UR STRIP-MCNC: ABNORMAL MG/DL
PROTHROMBIN TIME: 11.7 SEC (ref 9–11.1)
RBC # BLD AUTO: 5.17 M/UL (ref 4.1–5.7)
RBC #/AREA URNS HPF: ABNORMAL /HPF (ref 0–5)
SARS-COV-2 RNA RESP QL NAA+PROBE: NOT DETECTED
SERVICE CMNT-IMP: ABNORMAL
SODIUM SERPL-SCNC: 139 MMOL/L (ref 136–145)
SODIUM SERPL-SCNC: 144 MMOL/L (ref 136–145)
SP GR UR REFRACTOMETRY: >1.03 (ref 1–1.03)
SPECIMEN HOLD: NORMAL
TRIGL SERPL-MCNC: 177 MG/DL
TROPONIN I SERPL HS-MCNC: 16 NG/L (ref 0–76)
TROPONIN I SERPL HS-MCNC: 16 NG/L (ref 0–76)
TSH SERPL DL<=0.05 MIU/L-ACNC: 1.2 UIU/ML (ref 0.36–3.74)
URINE CULTURE IF INDICATED: ABNORMAL
UROBILINOGEN UR QL STRIP.AUTO: 1 EU/DL (ref 0.2–1)
VIT B12 SERPL-MCNC: 420 PG/ML (ref 193–986)
VLDLC SERPL CALC-MCNC: 35.4 MG/DL
WBC # BLD AUTO: 10.1 K/UL (ref 4.1–11.1)
WBC URNS QL MICRO: ABNORMAL /HPF (ref 0–4)

## 2023-07-18 PROCEDURE — 82607 VITAMIN B-12: CPT

## 2023-07-18 PROCEDURE — 71045 X-RAY EXAM CHEST 1 VIEW: CPT

## 2023-07-18 PROCEDURE — 96375 TX/PRO/DX INJ NEW DRUG ADDON: CPT

## 2023-07-18 PROCEDURE — 84100 ASSAY OF PHOSPHORUS: CPT

## 2023-07-18 PROCEDURE — 80061 LIPID PANEL: CPT

## 2023-07-18 PROCEDURE — 6360000002 HC RX W HCPCS: Performed by: STUDENT IN AN ORGANIZED HEALTH CARE EDUCATION/TRAINING PROGRAM

## 2023-07-18 PROCEDURE — 80307 DRUG TEST PRSMV CHEM ANLYZR: CPT

## 2023-07-18 PROCEDURE — 82077 ASSAY SPEC XCP UR&BREATH IA: CPT

## 2023-07-18 PROCEDURE — 96376 TX/PRO/DX INJ SAME DRUG ADON: CPT

## 2023-07-18 PROCEDURE — 6360000002 HC RX W HCPCS: Performed by: INTERNAL MEDICINE

## 2023-07-18 PROCEDURE — 83036 HEMOGLOBIN GLYCOSYLATED A1C: CPT

## 2023-07-18 PROCEDURE — 84443 ASSAY THYROID STIM HORMONE: CPT

## 2023-07-18 PROCEDURE — 84484 ASSAY OF TROPONIN QUANT: CPT

## 2023-07-18 PROCEDURE — 6370000000 HC RX 637 (ALT 250 FOR IP): Performed by: EMERGENCY MEDICINE

## 2023-07-18 PROCEDURE — 6370000000 HC RX 637 (ALT 250 FOR IP): Performed by: STUDENT IN AN ORGANIZED HEALTH CARE EDUCATION/TRAINING PROGRAM

## 2023-07-18 PROCEDURE — 81001 URINALYSIS AUTO W/SCOPE: CPT

## 2023-07-18 PROCEDURE — 86140 C-REACTIVE PROTEIN: CPT

## 2023-07-18 PROCEDURE — 83735 ASSAY OF MAGNESIUM: CPT

## 2023-07-18 PROCEDURE — 2580000003 HC RX 258: Performed by: INTERNAL MEDICINE

## 2023-07-18 PROCEDURE — 99285 EMERGENCY DEPT VISIT HI MDM: CPT

## 2023-07-18 PROCEDURE — 93306 TTE W/DOPPLER COMPLETE: CPT | Performed by: SPECIALIST

## 2023-07-18 PROCEDURE — 97161 PT EVAL LOW COMPLEX 20 MIN: CPT | Performed by: PHYSICAL THERAPIST

## 2023-07-18 PROCEDURE — 82746 ASSAY OF FOLIC ACID SERUM: CPT

## 2023-07-18 PROCEDURE — 93005 ELECTROCARDIOGRAM TRACING: CPT | Performed by: EMERGENCY MEDICINE

## 2023-07-18 PROCEDURE — 36415 COLL VENOUS BLD VENIPUNCTURE: CPT

## 2023-07-18 PROCEDURE — 93880 EXTRACRANIAL BILAT STUDY: CPT

## 2023-07-18 PROCEDURE — 2580000003 HC RX 258: Performed by: EMERGENCY MEDICINE

## 2023-07-18 PROCEDURE — 82962 GLUCOSE BLOOD TEST: CPT

## 2023-07-18 PROCEDURE — 85610 PROTHROMBIN TIME: CPT

## 2023-07-18 PROCEDURE — 1200000000 HC SEMI PRIVATE

## 2023-07-18 PROCEDURE — 85025 COMPLETE CBC W/AUTO DIFF WBC: CPT

## 2023-07-18 PROCEDURE — 94640 AIRWAY INHALATION TREATMENT: CPT

## 2023-07-18 PROCEDURE — 96360 HYDRATION IV INFUSION INIT: CPT

## 2023-07-18 PROCEDURE — 80053 COMPREHEN METABOLIC PANEL: CPT

## 2023-07-18 PROCEDURE — G0378 HOSPITAL OBSERVATION PER HR: HCPCS

## 2023-07-18 PROCEDURE — 87636 SARSCOV2 & INF A&B AMP PRB: CPT

## 2023-07-18 PROCEDURE — 70450 CT HEAD/BRAIN W/O DYE: CPT

## 2023-07-18 PROCEDURE — 93880 EXTRACRANIAL BILAT STUDY: CPT | Performed by: INTERNAL MEDICINE

## 2023-07-18 PROCEDURE — 97530 THERAPEUTIC ACTIVITIES: CPT | Performed by: PHYSICAL THERAPIST

## 2023-07-18 PROCEDURE — 93306 TTE W/DOPPLER COMPLETE: CPT

## 2023-07-18 PROCEDURE — 6370000000 HC RX 637 (ALT 250 FOR IP): Performed by: INTERNAL MEDICINE

## 2023-07-18 RX ORDER — ONDANSETRON 2 MG/ML
4 INJECTION INTRAMUSCULAR; INTRAVENOUS EVERY 6 HOURS PRN
Status: DISCONTINUED | OUTPATIENT
Start: 2023-07-18 | End: 2023-07-19 | Stop reason: SDUPTHER

## 2023-07-18 RX ORDER — POLYETHYLENE GLYCOL 3350 17 G/17G
17 POWDER, FOR SOLUTION ORAL DAILY PRN
Status: DISCONTINUED | OUTPATIENT
Start: 2023-07-18 | End: 2023-07-20 | Stop reason: HOSPADM

## 2023-07-18 RX ORDER — 0.9 % SODIUM CHLORIDE 0.9 %
1000 INTRAVENOUS SOLUTION INTRAVENOUS ONCE
Status: COMPLETED | OUTPATIENT
Start: 2023-07-18 | End: 2023-07-18

## 2023-07-18 RX ORDER — ASPIRIN 81 MG/1
81 TABLET ORAL DAILY
COMMUNITY

## 2023-07-18 RX ORDER — IPRATROPIUM BROMIDE AND ALBUTEROL SULFATE 2.5; .5 MG/3ML; MG/3ML
1 SOLUTION RESPIRATORY (INHALATION) EVERY 4 HOURS PRN
Status: DISCONTINUED | OUTPATIENT
Start: 2023-07-18 | End: 2023-07-20 | Stop reason: HOSPADM

## 2023-07-18 RX ORDER — ATORVASTATIN CALCIUM 40 MG/1
40 TABLET, FILM COATED ORAL NIGHTLY
Status: DISCONTINUED | OUTPATIENT
Start: 2023-07-18 | End: 2023-07-20 | Stop reason: HOSPADM

## 2023-07-18 RX ORDER — TAMSULOSIN HYDROCHLORIDE 0.4 MG/1
0.4 CAPSULE ORAL DAILY
Status: ON HOLD | COMMUNITY
End: 2023-07-18 | Stop reason: ALTCHOICE

## 2023-07-18 RX ORDER — POTASSIUM CHLORIDE 1.5 G/1.58G
40 POWDER, FOR SOLUTION ORAL ONCE
Status: DISCONTINUED | OUTPATIENT
Start: 2023-07-18 | End: 2023-07-18 | Stop reason: CLARIF

## 2023-07-18 RX ORDER — GABAPENTIN 100 MG/1
100 CAPSULE ORAL ONCE
Status: COMPLETED | OUTPATIENT
Start: 2023-07-18 | End: 2023-07-18

## 2023-07-18 RX ORDER — POTASSIUM CHLORIDE 7.45 MG/ML
10 INJECTION INTRAVENOUS
Status: DISPENSED | OUTPATIENT
Start: 2023-07-18 | End: 2023-07-18

## 2023-07-18 RX ORDER — ARFORMOTEROL TARTRATE 15 UG/2ML
15 SOLUTION RESPIRATORY (INHALATION)
Status: DISCONTINUED | OUTPATIENT
Start: 2023-07-18 | End: 2023-07-20 | Stop reason: HOSPADM

## 2023-07-18 RX ORDER — GABAPENTIN 300 MG/1
300 CAPSULE ORAL 2 TIMES DAILY
Status: DISCONTINUED | OUTPATIENT
Start: 2023-07-18 | End: 2023-07-19

## 2023-07-18 RX ORDER — POTASSIUM CHLORIDE 7.45 MG/ML
10 INJECTION INTRAVENOUS
Status: COMPLETED | OUTPATIENT
Start: 2023-07-18 | End: 2023-07-18

## 2023-07-18 RX ORDER — ALBUTEROL SULFATE 2.5 MG/3ML
2.5 SOLUTION RESPIRATORY (INHALATION) EVERY 6 HOURS PRN
COMMUNITY

## 2023-07-18 RX ORDER — ALBUTEROL SULFATE 2.5 MG/3ML
2.5 SOLUTION RESPIRATORY (INHALATION) 2 TIMES DAILY
Status: DISCONTINUED | OUTPATIENT
Start: 2023-07-18 | End: 2023-07-20 | Stop reason: HOSPADM

## 2023-07-18 RX ORDER — ONDANSETRON 4 MG/1
4 TABLET, ORALLY DISINTEGRATING ORAL EVERY 8 HOURS PRN
Status: DISCONTINUED | OUTPATIENT
Start: 2023-07-18 | End: 2023-07-20 | Stop reason: HOSPADM

## 2023-07-18 RX ORDER — ONDANSETRON 2 MG/ML
4 INJECTION INTRAMUSCULAR; INTRAVENOUS EVERY 6 HOURS PRN
Status: DISCONTINUED | OUTPATIENT
Start: 2023-07-18 | End: 2023-07-20 | Stop reason: HOSPADM

## 2023-07-18 RX ORDER — POTASSIUM CHLORIDE 750 MG/1
40 TABLET, FILM COATED, EXTENDED RELEASE ORAL ONCE
Status: COMPLETED | OUTPATIENT
Start: 2023-07-18 | End: 2023-07-18

## 2023-07-18 RX ORDER — MECLIZINE HCL 12.5 MG/1
25 TABLET ORAL
Status: COMPLETED | OUTPATIENT
Start: 2023-07-18 | End: 2023-07-18

## 2023-07-18 RX ORDER — MECLIZINE HCL 12.5 MG/1
12.5 TABLET ORAL 3 TIMES DAILY PRN
Status: DISCONTINUED | OUTPATIENT
Start: 2023-07-18 | End: 2023-07-20 | Stop reason: HOSPADM

## 2023-07-18 RX ORDER — HYDROCHLOROTHIAZIDE 25 MG/1
12.5 TABLET ORAL DAILY
Status: DISCONTINUED | OUTPATIENT
Start: 2023-07-18 | End: 2023-07-19

## 2023-07-18 RX ORDER — AMLODIPINE BESYLATE 5 MG/1
5 TABLET ORAL DAILY
Status: DISCONTINUED | OUTPATIENT
Start: 2023-07-18 | End: 2023-07-20 | Stop reason: HOSPADM

## 2023-07-18 RX ORDER — FLUTICASONE FUROATE, UMECLIDINIUM BROMIDE AND VILANTEROL TRIFENATATE 100; 62.5; 25 UG/1; UG/1; UG/1
1 POWDER RESPIRATORY (INHALATION) DAILY
COMMUNITY

## 2023-07-18 RX ORDER — TAMSULOSIN HYDROCHLORIDE 0.4 MG/1
0.4 CAPSULE ORAL DAILY
Status: DISCONTINUED | OUTPATIENT
Start: 2023-07-18 | End: 2023-07-19

## 2023-07-18 RX ORDER — ASPIRIN 325 MG
325 TABLET ORAL
Status: COMPLETED | OUTPATIENT
Start: 2023-07-18 | End: 2023-07-18

## 2023-07-18 RX ORDER — ENOXAPARIN SODIUM 100 MG/ML
30 INJECTION SUBCUTANEOUS 2 TIMES DAILY
Status: DISCONTINUED | OUTPATIENT
Start: 2023-07-18 | End: 2023-07-20 | Stop reason: HOSPADM

## 2023-07-18 RX ORDER — PROCHLORPERAZINE EDISYLATE 5 MG/ML
10 INJECTION INTRAMUSCULAR; INTRAVENOUS EVERY 6 HOURS PRN
Status: DISCONTINUED | OUTPATIENT
Start: 2023-07-18 | End: 2023-07-20 | Stop reason: HOSPADM

## 2023-07-18 RX ORDER — SODIUM CHLORIDE 9 MG/ML
INJECTION, SOLUTION INTRAVENOUS CONTINUOUS
Status: DISCONTINUED | OUTPATIENT
Start: 2023-07-18 | End: 2023-07-19

## 2023-07-18 RX ORDER — ASPIRIN 81 MG/1
81 TABLET ORAL DAILY
Status: DISCONTINUED | OUTPATIENT
Start: 2023-07-18 | End: 2023-07-20 | Stop reason: HOSPADM

## 2023-07-18 RX ORDER — BUDESONIDE 0.5 MG/2ML
0.5 INHALANT ORAL
Status: DISCONTINUED | OUTPATIENT
Start: 2023-07-18 | End: 2023-07-20 | Stop reason: HOSPADM

## 2023-07-18 RX ORDER — ESCITALOPRAM OXALATE 10 MG/1
10 TABLET ORAL DAILY
Status: DISCONTINUED | OUTPATIENT
Start: 2023-07-18 | End: 2023-07-19

## 2023-07-18 RX ORDER — ACETAMINOPHEN 650 MG/1
650 SUPPOSITORY RECTAL EVERY 4 HOURS PRN
Status: DISCONTINUED | OUTPATIENT
Start: 2023-07-18 | End: 2023-07-20 | Stop reason: HOSPADM

## 2023-07-18 RX ORDER — ASPIRIN 300 MG/1
300 SUPPOSITORY RECTAL DAILY
Status: DISCONTINUED | OUTPATIENT
Start: 2023-07-18 | End: 2023-07-20 | Stop reason: HOSPADM

## 2023-07-18 RX ORDER — SEMAGLUTIDE 0.68 MG/ML
0.5 INJECTION, SOLUTION SUBCUTANEOUS WEEKLY
Status: ON HOLD | COMMUNITY
End: 2023-07-18 | Stop reason: ALTCHOICE

## 2023-07-18 RX ORDER — BUTALBITAL, ACETAMINOPHEN AND CAFFEINE 50; 325; 40 MG/1; MG/1; MG/1
1 TABLET ORAL EVERY 4 HOURS PRN
Status: DISCONTINUED | OUTPATIENT
Start: 2023-07-18 | End: 2023-07-20 | Stop reason: HOSPADM

## 2023-07-18 RX ORDER — ACETAMINOPHEN 325 MG/1
650 TABLET ORAL EVERY 4 HOURS PRN
Status: DISCONTINUED | OUTPATIENT
Start: 2023-07-18 | End: 2023-07-18 | Stop reason: SDUPTHER

## 2023-07-18 RX ORDER — ACETAMINOPHEN 325 MG/1
650 TABLET ORAL EVERY 4 HOURS PRN
Status: DISCONTINUED | OUTPATIENT
Start: 2023-07-18 | End: 2023-07-20 | Stop reason: HOSPADM

## 2023-07-18 RX ORDER — NICOTINE 21 MG/24HR
1 PATCH, TRANSDERMAL 24 HOURS TRANSDERMAL DAILY
Status: DISCONTINUED | OUTPATIENT
Start: 2023-07-18 | End: 2023-07-19

## 2023-07-18 RX ADMIN — MECLIZINE 25 MG: 12.5 TABLET ORAL at 02:15

## 2023-07-18 RX ADMIN — SODIUM CHLORIDE: 9 INJECTION, SOLUTION INTRAVENOUS at 16:58

## 2023-07-18 RX ADMIN — ASPIRIN 325 MG: 325 TABLET ORAL at 03:38

## 2023-07-18 RX ADMIN — POTASSIUM CHLORIDE 10 MEQ: 7.46 INJECTION, SOLUTION INTRAVENOUS at 08:38

## 2023-07-18 RX ADMIN — GABAPENTIN 300 MG: 300 CAPSULE ORAL at 08:37

## 2023-07-18 RX ADMIN — GABAPENTIN 100 MG: 100 CAPSULE ORAL at 02:16

## 2023-07-18 RX ADMIN — ASPIRIN 81 MG: 81 TABLET, COATED ORAL at 08:36

## 2023-07-18 RX ADMIN — ATORVASTATIN CALCIUM 40 MG: 40 TABLET, FILM COATED ORAL at 21:46

## 2023-07-18 RX ADMIN — POTASSIUM BICARBONATE 20 MEQ: 782 TABLET, EFFERVESCENT ORAL at 10:40

## 2023-07-18 RX ADMIN — GABAPENTIN 300 MG: 300 CAPSULE ORAL at 21:45

## 2023-07-18 RX ADMIN — ACETAMINOPHEN 650 MG: 325 TABLET ORAL at 03:38

## 2023-07-18 RX ADMIN — SODIUM CHLORIDE: 9 INJECTION, SOLUTION INTRAVENOUS at 06:42

## 2023-07-18 RX ADMIN — POTASSIUM CHLORIDE 40 MEQ: 750 TABLET, EXTENDED RELEASE ORAL at 03:03

## 2023-07-18 RX ADMIN — ALBUTEROL SULFATE 2.5 MG: 2.5 SOLUTION RESPIRATORY (INHALATION) at 20:14

## 2023-07-18 RX ADMIN — MECLIZINE 12.5 MG: 12.5 TABLET ORAL at 10:40

## 2023-07-18 RX ADMIN — ACETAMINOPHEN 650 MG: 325 TABLET ORAL at 10:40

## 2023-07-18 RX ADMIN — SODIUM CHLORIDE 1000 ML: 9 INJECTION, SOLUTION INTRAVENOUS at 03:03

## 2023-07-18 RX ADMIN — BUDESONIDE INHALATION 500 MCG: 0.5 SUSPENSION RESPIRATORY (INHALATION) at 20:14

## 2023-07-18 RX ADMIN — HYDROCHLOROTHIAZIDE 12.5 MG: 25 TABLET ORAL at 08:36

## 2023-07-18 RX ADMIN — ARFORMOTEROL TARTRATE 15 MCG: 15 SOLUTION RESPIRATORY (INHALATION) at 20:14

## 2023-07-18 RX ADMIN — AMLODIPINE BESYLATE 5 MG: 5 TABLET ORAL at 08:37

## 2023-07-18 RX ADMIN — ACETAMINOPHEN 650 MG: 325 TABLET ORAL at 21:46

## 2023-07-18 RX ADMIN — POTASSIUM CHLORIDE 10 MEQ: 7.46 INJECTION, SOLUTION INTRAVENOUS at 06:42

## 2023-07-18 ASSESSMENT — PAIN DESCRIPTION - ORIENTATION
ORIENTATION: POSTERIOR;ANTERIOR
ORIENTATION: POSTERIOR
ORIENTATION: POSTERIOR;ANTERIOR

## 2023-07-18 ASSESSMENT — PAIN DESCRIPTION - LOCATION
LOCATION: HEAD
LOCATION: NECK

## 2023-07-18 ASSESSMENT — ENCOUNTER SYMPTOMS
ABDOMINAL PAIN: 0
RHINORRHEA: 0
SORE THROAT: 0
COUGH: 0
DIARRHEA: 0
NAUSEA: 0
EYE PAIN: 0
VOMITING: 0
SHORTNESS OF BREATH: 0

## 2023-07-18 ASSESSMENT — PAIN SCALES - GENERAL
PAINLEVEL_OUTOF10: 4
PAINLEVEL_OUTOF10: 3
PAINLEVEL_OUTOF10: 3
PAINLEVEL_OUTOF10: 0
PAINLEVEL_OUTOF10: 2
PAINLEVEL_OUTOF10: 3
PAINLEVEL_OUTOF10: 7

## 2023-07-18 ASSESSMENT — PAIN DESCRIPTION - PAIN TYPE
TYPE: ACUTE PAIN
TYPE: ACUTE PAIN

## 2023-07-18 ASSESSMENT — LIFESTYLE VARIABLES
HOW MANY STANDARD DRINKS CONTAINING ALCOHOL DO YOU HAVE ON A TYPICAL DAY: PATIENT DOES NOT DRINK
HOW OFTEN DO YOU HAVE A DRINK CONTAINING ALCOHOL: NEVER

## 2023-07-18 ASSESSMENT — PAIN DESCRIPTION - DESCRIPTORS
DESCRIPTORS: ACHING
DESCRIPTORS: ACHING
DESCRIPTORS: OTHER (COMMENT)
DESCRIPTORS: ACHING;THROBBING
DESCRIPTORS: ACHING

## 2023-07-18 NOTE — PROGRESS NOTES
..4 Eyes Skin Assessment     NAME:  Valjean Kanner  YOB: 1955  MEDICAL RECORD NUMBER:  [id number]    The patient is being assessed for  Admission    I agree that at least one RN has performed a thorough Head to Toe Skin Assessment on the patient. ALL assessment sites listed below have been assessed. Areas assessed by both nurses:    Head, Face, Ears, Shoulders, Back, Chest, Arms, Elbows, Hands, Sacrum. Buttock, Coccyx, Ischium, Legs. Feet and Heels, and Under Medical Devices         Does the Patient have a Wound?  No noted wound(s)       Jose Luis Prevention initiated by RN: No  Wound Care Orders initiated by RN: No    Pressure Injury (Stage 3,4, Unstageable, DTI, NWPT, and Complex wounds) if present, place Wound referral order by RN under : No    New Ostomies, if present place, Ostomy referral order under : No     Nurse 1 eSignature: Electronically signed by Janel Aguilar RN on 7/18/23 at 8:06 AM EDT    **SHARE this note so that the co-signing nurse can place an eSignature**    Nurse 2 eSignature: Electronically signed by Nhan Roberts RN on 7/18/23 at 10:12 PM EDT
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
## 2023-07-18 NOTE — PROGRESS NOTES
[address] Hospitalist Group                                                                               Hospitalist Progress Note  Yomaira Gomez MD          Date of Service:  2023  NAME:  Inna Ballard  :  1955  MRN:  [id number]    Please note that this dictation was completed with [employer], the computer voice recognition software. Quite often unanticipated grammatical, syntax, homophones, and other interpretive errors are inadvertently transcribed by the computer software. Please disregard these errors. Please excuse any errors that have escaped final proofreading. Admission Summary: This is a 49-year-old man with a past medical history significant for chronic kidney disease, hypertension, substance abuse, presented at the [facility] Emergency Room with unsteady gait. The patient also complained of dizziness and problem forming words. The patient's symptoms started at about 2:00 p.m. on Monday but did not come into the emergency room until 12 hours later. The patient also stated that he has a history of neuropathy, which is also making his symptoms worse. The patient did not describe the dizziness as room spinning around him, but the dizziness is worse with ambulation associated with mild diffuse headache. The patient did not describe the headache as throbbing. The patient admits to use of cocaine prior to coming to the emergency room. When the patient arrived at the emergency room, the patient was outside the tPA window, code stroke level 2 was called. CT scan of the head was obtained. The CT scan was negative for acute pathology. The patient was seen by the teleneurologist at the request of the emergency room physician. The teleneurologist advised admission for stroke workup. The patient was referred to the hospitalist service for that purpose. No prior history of CVA.          Interval history / Subjective:     Dizziness

## 2023-07-18 NOTE — ED NOTES
Pt refusing IV anywhere but L hand. RN and MD both explained about the probability of needing a CT with contrast. Pt says, \"I will not have an IV in my arm. I don't care! I don't like it there! \" MD explained that pt may also need an MRI, \"You're not putting me in a tube! \"      Mahad Santos RN  07/18/23 2929

## 2023-07-18 NOTE — PROGRESS NOTES
Please fill out MRI screening form. Nurse and patient to sign.  Once completed, Call MRI @ 320 to schedule exam.

## 2023-07-18 NOTE — CARE COORDINATION
BEST    RUR-10%    PLAN    2nd IMM  PCP,August 8th at 1045 am  Spiritual consult,   Neurology-7/21 at 1:00 PM with Coco Larose NP. CM met with MD and team at [facility] to discuss plan. CM met with patient at bedside he stated he is a little sleepy but would answer questions. Patient  stated he lives alone, his pharmacy is through [facility] and does not use a local pharmacy and that his transportation is his bike to any appointments and that his how he will get home. Primary care doctor is Gladys Silva. CM asked if he needed medications immediately after discharge would he be agreeable to using [facility] or [facility] near his home and he stated to ask him once he knows if he will have medication changes. CM to follow up with Provider for discharge medication information. CM informed him of Medicaid transportation and he stated he will continue riding his bike and does not need Medicaid transportation services and shpuld he need to go further he will ride the bus and transport his bike with him. CM scheduled PCP- Dr. Rizzo Angry at his [id number] [place] [address] location, ([address] [place] [address], [address]) on August 8th at 1045 am     07/18/23 1631   Service Assessment   Patient Orientation Alert and Oriented;Person;Place;Situation;Self   Cognition Alert   History Provided By Patient   Primary Caregiver Self   Accompanied By/Relationship N/A   [address] [place] [address]   PCP Verified by CM Yes  Gladys Silva)   Last Visit to PCP Within last two years   Prior Functional Level Independent in ADLs/IADLs   Current Functional Level Assistance with the following:;Bathing   Can patient return to prior living arrangement Yes   Ability to make needs known: Good   Family able to assist with home care needs: No   Would you like for me to discuss the discharge plan with any other family members/significant others, and if so, who?  Yes  (Daughter)   Financial Resources Medicare   CM/SW Referral Other (see comment)  (Pending Therapy)

## 2023-07-18 NOTE — H&P
[address] [place] [address]  HISTORY AND PHYSICAL    Name:  Beth Palma  MR#:  [id number]  :  1955  ACCOUNT #:  [de-identified]  ADMIT DATE:  2023    The patient was seen, evaluated, and admitted by me on 2023. PRIMARY CARE PHYSICIAN:  None. SOURCE OF INFORMATION:  The patient and review of ED electronic medical record. CHIEF COMPLAINT:  Gait abnormality. HISTORY OF PRESENT ILLNESS:  This is a 13-year-old man with a past medical history significant for chronic kidney disease, hypertension, substance abuse, presented at the [facility] Emergency Room with unsteady gait. The patient also complained of dizziness and problem forming words. The patient's symptoms started at about 2:00 p.m. on Monday but did not come into the emergency room until 12 hours later. The patient also stated that he has a history of neuropathy, which is also making his symptoms worse. The patient did not describe the dizziness as room spinning around him, but the dizziness is worse with ambulation associated with mild diffuse headache. The patient did not describe the headache as throbbing. The patient admits to use of cocaine prior to coming to the emergency room. When the patient arrived at the emergency room, the patient was outside the tPA window, code stroke level 2 was called. CT scan of the head was obtained. The CT scan was negative for acute pathology. The patient was seen by the teleneurologist at the request of the emergency room physician. The teleneurologist advised admission for stroke workup. The patient was referred to the hospitalist service for that purpose. No prior history of CVA. PAST MEDICAL HISTORY:  Hypertension, chronic kidney disease, substance abuse. PAST SURGICAL HISTORY:  This is significant for hernia repair. ALLERGIES:  NO KNOWN DRUG ALLERGIES. MEDICATIONS:  1. Hydrochlorothiazide 12.5 mg daily. 2.  Neurontin 300 mg twice daily.   3.  Lexapro

## 2023-07-18 NOTE — ED NOTES
TRANSFER - OUT REPORT:    Verbal report given to Laly Griggs RN on [facility] [place]  being transferred to 207- for routine progression of patient care       Report consisted of patient's Situation, Background, Assessment and   Recommendations(SBAR). Information from the following report(s) Nurse Handoff Report, Index, ED Encounter Summary, ED SBAR, Adult Overview, Intake/Output, MAR, Recent Results, and Cardiac Rhythm NSR  was reviewed with the receiving nurse. Glide Fall Assessment:    Presents to emergency department  because of falls (Syncope, seizure, or loss of consciousness): No  Age > 70: No  Altered Mental Status, Intoxication with alcohol or substance confusion (Disorientation, impaired judgment, poor safety awaremess, or inability to follow instructions): No  Impaired Mobility: Ambulates or transfers with assistive devices or assistance; Unable to ambulate or transer.: No  Nursing Judgement: No          Lines:   Peripheral IV 07/18/23 Left;Posterior Hand (Active)   Site Assessment Clean, dry & intact 07/18/23 0128   Line Status Blood return noted;Brisk blood return;Flushed 07/18/23 0128   Line Care Cap changed 07/18/23 0128   Phlebitis Assessment No symptoms 07/18/23 0128   Infiltration Assessment 0 07/18/23 0128   Alcohol Cap Used Yes 07/18/23 0128   Dressing Status Clean, dry & intact; New dressing applied 07/18/23 0128   Dressing Type Transparent 07/18/23 0128   Dressing Intervention New 07/18/23 0128        Opportunity for questions and clarification was provided.       Patient transported with:  Monitor and Registered Nurse           Myla Whiting RN  07/18/23 7288

## 2023-07-18 NOTE — PROGRESS NOTES
80 )Horace Shannon items-- Pt has a headache 4/10 (temporal, behind eyes and occipital)-- could he have anything for moderate pain. Also Pt is refusing MRI (too claustrophobic unless we have an open MRI/pt is also refusing an IV anywhere but hand so unable to have CTA). Could pt also have another dose of meclizine for dizziness?  0801) Pt slight asymmetry mouth. Report decreased sensation R face and R arm.  equal.  Pt report some dizziness and difficulty finding the right words. 1045) IDR with Dr. Leonela Gates (MD), Breann Brush (pharmacist), Jay Samuels (), Pradip Berry (nurse supervisor),  Laura Bridges ([address] [place] [address]), Enma Estes (), Tenzin Thrasher (PT), and Latricia Mejia (RN) to discuss plan of care including pt refuse MRI, plan for repeat CT tomorrow, Echo and duplex, consult to neurology, nebulizer treatments, pt wheezing on ambulation but not on assessment at rest.   1431)horace Morin less than 20 mmHg drop-- 130/71(supine) , 131/79 (sit), 116/83 (stand)  1500) , neurologist, at bedside via telerobot. Discussed recommendation for MRI with pt. Plan for PT/OT.  Medication for headache.   1850) Dr. Junaid Benavidez order for fiorcet and compazine for headaches

## 2023-07-18 NOTE — PROGRESS NOTES
TRANSFER - IN REPORT:    Verbal report received from Mirza Leiva RN on Harinder Head  being received from ED for routine progression of patient care      Report consisted of patient's Situation, Background, Assessment and   Recommendations(SBAR). Information from the following report(s) Nurse Handoff Report, MAR, and Recent Results was reviewed with the receiving nurse. Opportunity for questions and clarification was provided. Assessment completed upon patient's arrival to unit and care assumed.

## 2023-07-18 NOTE — PROGRESS NOTES
Speech pathology note  Reviewed chart and note patient admitted with gait abnormality, dizziness, and word-retrieval deficits with concern for CVA, however note patient also with cocaine use prior to coming to ED. Note CT showed No acute findings, Chronic basal ganglia lacunae and nonspecific chronic white matter changes most likely reflective of microvascular ischemic change. MRI pending. Note patient passed the nursing dysphagia screen and a regular diet was ordered. NIHSS=0. Discussed case with PT who reported no SLP-related concerns. However, SLP will follow if indicated pending MRI results. Thank you.     Gali Ward, SLP

## 2023-07-18 NOTE — ED TRIAGE NOTES
Pt presents to ED with c/o shuffling gait & balance issues that started around 1400 yesterday. Pt rode bike to ED, placed in ED room via wheelchair.

## 2023-07-18 NOTE — ED PROVIDER NOTES
EMERGENCY DEPARTMENT HISTORY AND PHYSICAL EXAM            Please note that this dictation was completed with the assistance of \"Dragon\", the computer voice recognition software. Quite often unanticipated grammatical, syntax, homophones, and other interpretive errors are inadvertently transcribed by the computer software. Please disregard these errors and any errors that have escaped final proofreading. Thank you. Date of Evaluation: 07/18/23  Patient: Maryanne Chase  Patient Age and Sex: 77 y.o. male   MRN: [id number]  CSN: [id number]  PCP: None None    History of Present Illness     Chief Complaint   Patient presents with    Gait Problem     History Provided By: Patient/family/EMS (if available)    History is limited by: Nothing     HPI: Maryanne Chase, 77 y.o. male with past medical history as documented below presents to the ED with c/o of mild to moderate shuffling gait, dizziness and imbalance issues as well as difficulty forming words. Patient reports symptoms started around 2 pm Monday, 7/17/23. This is approximately 12 hours prior to arrival.  Patient denies any falls or trauma. Patient expresses concern for possible stroke. Patient does have a history of neuropathy and previously took gabapentin. Patient also reports mild headache with associated dizziness. Pt denies any blurry or double vision. No focal numbness or weakness. Patient also admitted to doing cocaine prior to arrival.  Patient does take baby aspirin daily. No other anticoagulation. Pt denies any other exacerbating or ameliorating factors. There are no other complaints, changes or physical findings pertinent to the HPI at this time. Nursing Notes were all reviewed and agreed with or any disagreements were addressed in the HPI.     Past History   Past Medical History:  Past Medical History:   Diagnosis Date    Frequent headaches     Hypertension     Muscle pain     Neuropathy     Visual disturbance        Past Surgical History:  Past Previous electrocardiograms, Previous Radiology Studies and Previous Laboratory Studies, EMS reports    DIFFERENTIAL DIAGNOSIS AND PLAN:  Patient presents with stroke-like symptoms and seen immediately by me on arrival. Spoke with EMS and/or family regarding symptoms, Time of onset, vitals and normal glucose. DDx: ischemic vs. Hemorrhagic stroke, complex migraine, cira's paralysis. Given the history and physical, will get a CT head and Neurology consult to determine if tPA warranted. Will continue to monitor closely in the ED and will ultimately need admission for further w/u. After discussion with Tele-Neuro, tPA will not be administered to this patient because symptoms present for more than 4.5 hours, risk of tPA with low score on NIH stroke scale, resolution of symptoms consistent with TIA. Pertinent Chronic Medical Conditions Affecting Care:  Past Medical History:   Diagnosis Date    Frequent headaches     Hypertension     Muscle pain     Neuropathy     Visual disturbance      HYPERTENSION COUNSELING  For 10 minutes, education was provided to the patient today regarding their hypertension. Patient is made aware of their elevated blood pressure and is instructed to follow up this week with their Primary Care for a recheck. Patient is counseled regarding consequences of chronic, uncontrolled hypertension including kidney disease, heart disease, stroke or even death. Patient states their understanding and agrees to follow up this week. Additionally, during their visit, I discussed sodium restriction, maintaining ideal body weight and regular exercise program as physiologic means to achieve blood pressure control. The patient will strive towards this.     Review of Prior Records and External Documents:  See below    Social Determinants of Health Affecting Dx or Tx:  None  Social Determinants of Health with Concerns     Tobacco Use: High Risk    Smoking Tobacco Use: Some Days    Smokeless Tobacco Use: Never

## 2023-07-18 NOTE — PROGRESS NOTES
[facility] Medication Reconciliation    Information obtained from: Patient and [facility]  RxQuery data available1:Yes    Comments/recommendations:  Medication reconciliation performed with patient and verified with [place] [address] [facility] [place]. Patient states they take Ozempic 0.5 mg daily on Mondays, however no record found with mail order and RxQuery data in 2023    Patient states he currently takes prednisone at home, however most recent prescription was dispensed 6/14- Prednisone 5 mg bid; 5 DS. Patient states they take a blue tablet for prostate, however could not recall the name of the medication. No record found with mail order and RxQuery data in 2023. The [place] Prescription Monitoring Program () was accessed to determine fill history of any controlled medications. No record found. Medication changes (since last review): Added  None  Removed  Flomax  Ozempic  Adjusted  None        1RxQuery pharmacy benefit data reflects medications filled and processed through the patient's insurance, however                this data does NOT capture whether the medication was picked up or is currently being taken by the patient. Patient allergies: Allergies as of 07/18/2023    (No Known Allergies)     Prior to Admission Medications   Prescriptions Last Dose Informant Patient Reported? Taking?    albuterol (PROVENTIL) (2.5 MG/3ML) 0.083% nebulizer solution  Self Yes Yes   Sig: Take 3 mLs by nebulization every 6 hours as needed for Wheezing   amLODIPine (NORVASC) 5 MG tablet  Self Yes No   Sig: Take 1 tablet by mouth daily   aspirin 81 MG EC tablet  Self Yes Yes   Sig: Take 1 tablet by mouth daily   fluticasone-umeclidin-vilant (TRELEGY ELLIPTA) 100-62.5-25 MCG/ACT AEPB inhaler  Self Yes Yes   Sig: Inhale 1 puff into the lungs daily   hydroCHLOROthiazide (MICROZIDE) 12.5 MG capsule  Self Yes No   Sig: Take 1 capsule by mouth daily      Facility-Administered Medications:

## 2023-07-19 ENCOUNTER — APPOINTMENT (OUTPATIENT)
Facility: HOSPITAL | Age: 68
DRG: 103 | End: 2023-07-19
Payer: MEDICARE

## 2023-07-19 LAB
ALBUMIN SERPL-MCNC: 3.3 G/DL (ref 3.5–5)
ALBUMIN/GLOB SERPL: 1.1 (ref 1.1–2.2)
ALP SERPL-CCNC: 66 U/L (ref 45–117)
ALT SERPL-CCNC: 23 U/L (ref 12–78)
ANION GAP SERPL CALC-SCNC: 9 MMOL/L (ref 5–15)
AST SERPL-CCNC: 18 U/L (ref 15–37)
BASOPHILS # BLD: 0.1 K/UL (ref 0–0.1)
BASOPHILS NFR BLD: 1 % (ref 0–1)
BILIRUB SERPL-MCNC: 0.2 MG/DL (ref 0.2–1)
BUN SERPL-MCNC: 20 MG/DL (ref 6–20)
BUN/CREAT SERPL: 14 (ref 12–20)
CALCIUM SERPL-MCNC: 8.5 MG/DL (ref 8.5–10.1)
CHLORIDE SERPL-SCNC: 106 MMOL/L (ref 97–108)
CO2 SERPL-SCNC: 28 MMOL/L (ref 21–32)
CREAT SERPL-MCNC: 1.4 MG/DL (ref 0.7–1.3)
DIFFERENTIAL METHOD BLD: NORMAL
ECHO BSA: 2.31 M2
EKG ATRIAL RATE: 100 BPM
EKG ATRIAL RATE: 69 BPM
EKG DIAGNOSIS: NORMAL
EKG DIAGNOSIS: NORMAL
EKG P AXIS: 1 DEGREES
EKG P AXIS: 31 DEGREES
EKG P-R INTERVAL: 140 MS
EKG P-R INTERVAL: 148 MS
EKG Q-T INTERVAL: 372 MS
EKG Q-T INTERVAL: 398 MS
EKG QRS DURATION: 100 MS
EKG QRS DURATION: 102 MS
EKG QTC CALCULATION (BAZETT): 426 MS
EKG QTC CALCULATION (BAZETT): 479 MS
EKG R AXIS: -47 DEGREES
EKG R AXIS: -70 DEGREES
EKG T AXIS: 256 DEGREES
EKG T AXIS: 65 DEGREES
EKG VENTRICULAR RATE: 100 BPM
EKG VENTRICULAR RATE: 69 BPM
EOSINOPHIL # BLD: 0.1 K/UL (ref 0–0.4)
EOSINOPHIL NFR BLD: 2 % (ref 0–7)
ERYTHROCYTE [DISTWIDTH] IN BLOOD BY AUTOMATED COUNT: 13.5 % (ref 11.5–14.5)
GLOBULIN SER CALC-MCNC: 2.9 G/DL (ref 2–4)
GLUCOSE BLD STRIP.AUTO-MCNC: 137 MG/DL (ref 65–117)
GLUCOSE SERPL-MCNC: 164 MG/DL (ref 65–100)
HCT VFR BLD AUTO: 40.5 % (ref 36.6–50.3)
HGB BLD-MCNC: 13.8 G/DL (ref 12.1–17)
IMM GRANULOCYTES # BLD AUTO: 0 K/UL (ref 0–0.04)
IMM GRANULOCYTES NFR BLD AUTO: 0 % (ref 0–0.5)
LYMPHOCYTES # BLD: 1.7 K/UL (ref 0.8–3.5)
LYMPHOCYTES NFR BLD: 22 % (ref 12–49)
MCH RBC QN AUTO: 30.9 PG (ref 26–34)
MCHC RBC AUTO-ENTMCNC: 34.1 G/DL (ref 30–36.5)
MCV RBC AUTO: 90.6 FL (ref 80–99)
MONOCYTES # BLD: 0.8 K/UL (ref 0–1)
MONOCYTES NFR BLD: 10 % (ref 5–13)
NEUTS SEG # BLD: 5.3 K/UL (ref 1.8–8)
NEUTS SEG NFR BLD: 65 % (ref 32–75)
NRBC # BLD: 0 K/UL (ref 0–0.01)
NRBC BLD-RTO: 0 PER 100 WBC
PLATELET # BLD AUTO: 283 K/UL (ref 150–400)
PMV BLD AUTO: 9.9 FL (ref 8.9–12.9)
POTASSIUM SERPL-SCNC: 3.4 MMOL/L (ref 3.5–5.1)
PROT SERPL-MCNC: 6.2 G/DL (ref 6.4–8.2)
RBC # BLD AUTO: 4.47 M/UL (ref 4.1–5.7)
SERVICE CMNT-IMP: ABNORMAL
SODIUM SERPL-SCNC: 143 MMOL/L (ref 136–145)
VAS LEFT CCA DIST EDV: 15.5 CM/S
VAS LEFT CCA DIST PSV: 118 CM/S
VAS LEFT CCA PROX EDV: 15.2 CM/S
VAS LEFT CCA PROX PSV: 126.3 CM/S
VAS LEFT ECA EDV: 6.9 CM/S
VAS LEFT ECA PSV: 71.8 CM/S
VAS LEFT ICA DIST EDV: 25.2 CM/S
VAS LEFT ICA DIST PSV: 74.2 CM/S
VAS LEFT ICA MID EDV: 16.7 CM/S
VAS LEFT ICA MID PSV: 59.5 CM/S
VAS LEFT ICA PROX EDV: 21.3 CM/S
VAS LEFT ICA PROX PSV: 71 CM/S
VAS LEFT ICA/CCA PSV: 0.6 NO UNITS
VAS LEFT VERTEBRAL EDV: 16.7 CM/S
VAS LEFT VERTEBRAL PSV: 62 CM/S
VAS RIGHT CCA DIST EDV: 23.3 CM/S
VAS RIGHT CCA DIST PSV: 109.8 CM/S
VAS RIGHT CCA PROX EDV: 19.8 CM/S
VAS RIGHT CCA PROX PSV: 105.1 CM/S
VAS RIGHT ECA EDV: 10.6 CM/S
VAS RIGHT ECA PSV: 73.6 CM/S
VAS RIGHT ICA DIST EDV: 23.2 CM/S
VAS RIGHT ICA DIST PSV: 107.7 CM/S
VAS RIGHT ICA MID EDV: 24.9 CM/S
VAS RIGHT ICA MID PSV: 103.6 CM/S
VAS RIGHT ICA PROX EDV: 33.8 CM/S
VAS RIGHT ICA PROX PSV: 105.4 CM/S
VAS RIGHT ICA/CCA PSV: 1 NO UNITS
VAS RIGHT VERTEBRAL EDV: 15.3 CM/S
VAS RIGHT VERTEBRAL PSV: 43.9 CM/S
WBC # BLD AUTO: 8.1 K/UL (ref 4.1–11.1)

## 2023-07-19 PROCEDURE — 93010 ELECTROCARDIOGRAM REPORT: CPT | Performed by: SPECIALIST

## 2023-07-19 PROCEDURE — 6370000000 HC RX 637 (ALT 250 FOR IP): Performed by: STUDENT IN AN ORGANIZED HEALTH CARE EDUCATION/TRAINING PROGRAM

## 2023-07-19 PROCEDURE — 85025 COMPLETE CBC W/AUTO DIFF WBC: CPT

## 2023-07-19 PROCEDURE — 70450 CT HEAD/BRAIN W/O DYE: CPT

## 2023-07-19 PROCEDURE — 95706 EEG WO VID 2-12HR INTMT MNTR: CPT

## 2023-07-19 PROCEDURE — 94640 AIRWAY INHALATION TREATMENT: CPT

## 2023-07-19 PROCEDURE — 1200000000 HC SEMI PRIVATE

## 2023-07-19 PROCEDURE — 97116 GAIT TRAINING THERAPY: CPT | Performed by: PHYSICAL THERAPIST

## 2023-07-19 PROCEDURE — 97165 OT EVAL LOW COMPLEX 30 MIN: CPT | Performed by: OCCUPATIONAL THERAPIST

## 2023-07-19 PROCEDURE — 96375 TX/PRO/DX INJ NEW DRUG ADDON: CPT

## 2023-07-19 PROCEDURE — 36415 COLL VENOUS BLD VENIPUNCTURE: CPT

## 2023-07-19 PROCEDURE — 6360000002 HC RX W HCPCS: Performed by: STUDENT IN AN ORGANIZED HEALTH CARE EDUCATION/TRAINING PROGRAM

## 2023-07-19 PROCEDURE — 6370000000 HC RX 637 (ALT 250 FOR IP): Performed by: INTERNAL MEDICINE

## 2023-07-19 PROCEDURE — 2580000003 HC RX 258: Performed by: STUDENT IN AN ORGANIZED HEALTH CARE EDUCATION/TRAINING PROGRAM

## 2023-07-19 PROCEDURE — 96365 THER/PROPH/DIAG IV INF INIT: CPT

## 2023-07-19 PROCEDURE — 97535 SELF CARE MNGMENT TRAINING: CPT | Performed by: OCCUPATIONAL THERAPIST

## 2023-07-19 PROCEDURE — 2580000003 HC RX 258: Performed by: INTERNAL MEDICINE

## 2023-07-19 PROCEDURE — 2500000003 HC RX 250 WO HCPCS: Performed by: STUDENT IN AN ORGANIZED HEALTH CARE EDUCATION/TRAINING PROGRAM

## 2023-07-19 PROCEDURE — G0378 HOSPITAL OBSERVATION PER HR: HCPCS

## 2023-07-19 PROCEDURE — 82962 GLUCOSE BLOOD TEST: CPT

## 2023-07-19 PROCEDURE — 95813 EEG EXTND MNTR 61-119 MIN: CPT | Performed by: PSYCHIATRY & NEUROLOGY

## 2023-07-19 PROCEDURE — 80053 COMPREHEN METABOLIC PANEL: CPT

## 2023-07-19 PROCEDURE — 96366 THER/PROPH/DIAG IV INF ADDON: CPT

## 2023-07-19 PROCEDURE — 96367 TX/PROPH/DG ADDL SEQ IV INF: CPT

## 2023-07-19 RX ORDER — GABAPENTIN 100 MG/1
100 CAPSULE ORAL 3 TIMES DAILY
Status: DISCONTINUED | OUTPATIENT
Start: 2023-07-19 | End: 2023-07-20 | Stop reason: HOSPADM

## 2023-07-19 RX ORDER — DEXAMETHASONE SODIUM PHOSPHATE 10 MG/ML
10 INJECTION INTRAMUSCULAR; INTRAVENOUS ONCE
Status: COMPLETED | OUTPATIENT
Start: 2023-07-19 | End: 2023-07-19

## 2023-07-19 RX ORDER — CYCLOBENZAPRINE HCL 10 MG
10 TABLET ORAL 3 TIMES DAILY PRN
Status: DISCONTINUED | OUTPATIENT
Start: 2023-07-19 | End: 2023-07-20 | Stop reason: HOSPADM

## 2023-07-19 RX ORDER — MAGNESIUM SULFATE HEPTAHYDRATE 40 MG/ML
2000 INJECTION, SOLUTION INTRAVENOUS ONCE
Status: COMPLETED | OUTPATIENT
Start: 2023-07-19 | End: 2023-07-19

## 2023-07-19 RX ORDER — PROCHLORPERAZINE EDISYLATE 5 MG/ML
10 INJECTION INTRAMUSCULAR; INTRAVENOUS ONCE
Status: COMPLETED | OUTPATIENT
Start: 2023-07-19 | End: 2023-07-19

## 2023-07-19 RX ORDER — CLOPIDOGREL BISULFATE 75 MG/1
75 TABLET ORAL DAILY
Status: DISCONTINUED | OUTPATIENT
Start: 2023-07-19 | End: 2023-07-20 | Stop reason: HOSPADM

## 2023-07-19 RX ADMIN — CLOPIDOGREL BISULFATE 75 MG: 75 TABLET, FILM COATED ORAL at 11:08

## 2023-07-19 RX ADMIN — ACETAMINOPHEN 650 MG: 325 TABLET ORAL at 08:47

## 2023-07-19 RX ADMIN — CYCLOBENZAPRINE 10 MG: 10 TABLET, FILM COATED ORAL at 11:08

## 2023-07-19 RX ADMIN — MAGNESIUM SULFATE HEPTAHYDRATE 2000 MG: 40 INJECTION, SOLUTION INTRAVENOUS at 12:10

## 2023-07-19 RX ADMIN — BUDESONIDE INHALATION 500 MCG: 0.5 SUSPENSION RESPIRATORY (INHALATION) at 20:42

## 2023-07-19 RX ADMIN — GABAPENTIN 100 MG: 100 CAPSULE ORAL at 21:33

## 2023-07-19 RX ADMIN — DEXAMETHASONE SODIUM PHOSPHATE 10 MG: 10 INJECTION INTRAMUSCULAR; INTRAVENOUS at 12:11

## 2023-07-19 RX ADMIN — POTASSIUM BICARBONATE 40 MEQ: 782 TABLET, EFFERVESCENT ORAL at 11:08

## 2023-07-19 RX ADMIN — MECLIZINE 12.5 MG: 12.5 TABLET ORAL at 09:12

## 2023-07-19 RX ADMIN — ARFORMOTEROL TARTRATE 15 MCG: 15 SOLUTION RESPIRATORY (INHALATION) at 20:42

## 2023-07-19 RX ADMIN — SODIUM CHLORIDE: 9 INJECTION, SOLUTION INTRAVENOUS at 02:40

## 2023-07-19 RX ADMIN — ARFORMOTEROL TARTRATE 15 MCG: 15 SOLUTION RESPIRATORY (INHALATION) at 07:59

## 2023-07-19 RX ADMIN — BUDESONIDE INHALATION 500 MCG: 0.5 SUSPENSION RESPIRATORY (INHALATION) at 07:59

## 2023-07-19 RX ADMIN — ATORVASTATIN CALCIUM 40 MG: 40 TABLET, FILM COATED ORAL at 21:32

## 2023-07-19 RX ADMIN — GABAPENTIN 300 MG: 300 CAPSULE ORAL at 08:47

## 2023-07-19 RX ADMIN — HYDROCHLOROTHIAZIDE 12.5 MG: 25 TABLET ORAL at 09:09

## 2023-07-19 RX ADMIN — ALBUTEROL SULFATE 2.5 MG: 2.5 SOLUTION RESPIRATORY (INHALATION) at 07:59

## 2023-07-19 RX ADMIN — ASPIRIN 81 MG: 81 TABLET, COATED ORAL at 09:10

## 2023-07-19 RX ADMIN — VALPROATE SODIUM 200 MG: 100 INJECTION, SOLUTION INTRAVENOUS at 15:20

## 2023-07-19 RX ADMIN — ALBUTEROL SULFATE 2.5 MG: 2.5 SOLUTION RESPIRATORY (INHALATION) at 20:42

## 2023-07-19 RX ADMIN — AMLODIPINE BESYLATE 5 MG: 5 TABLET ORAL at 09:09

## 2023-07-19 RX ADMIN — PROCHLORPERAZINE EDISYLATE 10 MG: 5 INJECTION INTRAMUSCULAR; INTRAVENOUS at 12:11

## 2023-07-19 RX ADMIN — ESCITALOPRAM OXALATE 10 MG: 10 TABLET ORAL at 09:10

## 2023-07-19 ASSESSMENT — PAIN DESCRIPTION - LOCATION
LOCATION: BACK
LOCATION: HEAD
LOCATION: BACK
LOCATION: HEAD
LOCATION: HEAD
LOCATION: BACK

## 2023-07-19 ASSESSMENT — PAIN - FUNCTIONAL ASSESSMENT
PAIN_FUNCTIONAL_ASSESSMENT: ACTIVITIES ARE NOT PREVENTED

## 2023-07-19 ASSESSMENT — PAIN DESCRIPTION - PAIN TYPE
TYPE: CHRONIC PAIN
TYPE: ACUTE PAIN

## 2023-07-19 ASSESSMENT — PAIN DESCRIPTION - DESCRIPTORS
DESCRIPTORS: ACHING
DESCRIPTORS: STABBING;SHARP
DESCRIPTORS: STABBING
DESCRIPTORS: ACHING

## 2023-07-19 ASSESSMENT — PAIN SCALES - GENERAL
PAINLEVEL_OUTOF10: 6
PAINLEVEL_OUTOF10: 9
PAINLEVEL_OUTOF10: 0
PAINLEVEL_OUTOF10: 6
PAINLEVEL_OUTOF10: 0
PAINLEVEL_OUTOF10: 4
PAINLEVEL_OUTOF10: 0
PAINLEVEL_OUTOF10: 4
PAINLEVEL_OUTOF10: 0

## 2023-07-19 ASSESSMENT — PAIN DESCRIPTION - ORIENTATION
ORIENTATION: LOWER;MID
ORIENTATION: LOWER;MID
ORIENTATION: ANTERIOR
ORIENTATION: ANTERIOR

## 2023-07-19 ASSESSMENT — PAIN DESCRIPTION - FREQUENCY
FREQUENCY: CONTINUOUS
FREQUENCY: CONTINUOUS

## 2023-07-19 ASSESSMENT — PULMONARY FUNCTION TESTS: PEFR_L/MIN: 16

## 2023-07-19 ASSESSMENT — PAIN DESCRIPTION - ONSET
ONSET: ON-GOING
ONSET: ON-GOING

## 2023-07-19 ASSESSMENT — PAIN SCALES - WONG BAKER
WONGBAKER_NUMERICALRESPONSE: 4
WONGBAKER_NUMERICALRESPONSE: 0

## 2023-07-19 NOTE — PROGRESS NOTES
1920-Bedside shift change report given to [employer] (oncoming nurse) by Leticia Serra (offgoing nurse). Report included the following information Nurse Handoff Report, Adult Overview, Intake/Output, and MAR. Pt received awake in bed, handoff [facility]  completed. Pt able to make needs known. RN rounds in place, plan of care to continue. 2140-Pt cooperative with assessments, pt refused lovenox injection, pt educated on medication usage, pt continued to refuse. [id number]-Jw resting in bed with eyes closed, respirations even and unlabored, rise and fall of chest observed. Pt up at times, snacks provided. 0400-Labs drawn and tolerated.

## 2023-07-19 NOTE — ED NOTES
0800- patient took out is IV this morning and showered on his own without asking rn for assistance. 3541- Patient seen and assessed. Patient is unsatisfied with patient care over the weekend. Patient stated that [facility] doctor has been in to see me all weekend\". RN apologized and told patient she would paged MD to see patient this morning. MD fonseca paged. Patient also stated that he has had a headache, right arm pain and blurred vision for 3 days that \"nonone had addressed\". Notified patient that he received Fioricet and asked patient if it worked. I told rn \"who prescribed that medication you are not a doctor and you are not allow to give me medication without a doctor\". RN educated patient MD order medications and RN from night shift administered medication. Patient reports stated \"someone left this oxygen on are you going to turn it off\". Patient stated \"do you know what you are doing, where is my primary nurse\". RN notified patient that she will be his nurse for the shift. Patient stated, 'you can get out of here and find me a new nurse\". 5652- ccl notifed of patient request for new RN.     0920- morning medications given. Patient requested 2nd breakfast tray provided by dietary. Patient refused flomax, lovenox and nicotine patch. 0930- per MD fonseca patient does not need IV at this time ok to hold fluids    0935- notified MD K+ needs replaced and the medications stated above that patient is refusing. Bran- MD d/c'd lovenox, flomax and nicotine patch. 1020- Rounded with MD fonseca. Patient continues to complain of dizziness (ortho statics negative with pt this morning), blurred vision, headache, n&t to RUE. Patient refused Fioricet this am but MD to speak with patient about taking. PER MD patient needs EEG and CT this morning. Flexeril add for back pain    1050- patient continues to refuse MRI     1100- New IV placed for migraine cocktail.  Waiting on cerebell to complete on another patient

## 2023-07-19 NOTE — PROGRESS NOTES
Physician Progress Note      PATIENT:               Jeny Poster  CSN #:                  [id number]  :                       1955  ADMIT DATE:       2023 1:14 AM  DISCH DATE:  RESPONDING  PROVIDER #:        Davi Noel MD        QUERY TEXT:    Stage of Chronic Kidney Disease: Please provide further specificity, if known. Clinical indicators include: chronic kidney disease, bun  Options provided:  -- Chronic kidney disease stage 1  -- Chronic kidney disease stage 2  -- Chronic kidney disease stage 3  -- Chronic kidney disease stage 3a  -- Chronic kidney disease stage 3b  -- Chronic kidney disease stage 4  -- Chronic kidney disease stage 5  -- Chronic kidney disease stage 5, requiring dialysis  -- End stage renal disease  -- Other - I will add my own diagnosis  -- Disagree - Not applicable / Not valid  -- Disagree - Clinically Unable to determine / Unknown        PROVIDER RESPONSE TEXT:    The patient has chronic kidney disease stage 3.       Electronically signed by:  Davi Noel MD 2023 9:34 AM

## 2023-07-19 NOTE — PROGRESS NOTES
CT is waiting for patient to be ready to come to CT. Transport went to bring patient to CT and he has to use the restroom and states he's not sure how long it will take him. Floor to call when the patient is ready.  [address] [place] [address].

## 2023-07-19 NOTE — PLAN OF CARE
common sense are also important. However direct testing is not needed. 5. Usually the patient's performance over the preceding 24-48 hours is important, but occasionally longer periods will be relevant. 6. Middle categories imply that the patient supplies over 50 per cent of the effort. 7. Use of aids to be independent is allowed. Score Interpretation (from [address] [place] [address])    Independent   60-79 Minimally independent   40-59 Partially dependent   20-39 Very dependent   <20 Totally dependent     -Nathalia Painter., Barthel, DMiriW. (1965). Functional evaluation: the Barthel Index. [facility] (1997). The Barthel activities of daily living index: self-reporting versus actual performance in the old (> or = 75 years). Journal of [facility]0 [facility] 45(7), [address] [facility], J.JHUNTER.F, Miguelina Harman., Kristie Wyatt. (1999). Measuring the change in disability after inpatient rehabilitation; comparison of the responsiveness of the Barthel Index and Functional [place] Measure. Journal of Neurology, Neurosurgery, and Psychiatry, 66(4), 407-165. JOHN Casanova.RAFFI, SOHEILA Victor, & Ford Kussmaul, M.A. (2004) Assessment of post-stroke quality of life in cost-effectiveness studies: The usefulness of the Barthel Index and the EuroQoL-5D.  Quality of Life Research, 13, 427-43                                                                                                                                                                                                                                 Pain Rating:  Not rated, pain in right LE due to sciatica, no complaint once strap in place   Pain Intervention(s):   nursing notified, rest, repositioning, and pain is at a level acceptable to the patient    Activity Tolerance:   Fair  and fluctuates based on pain    After treatment:   Patient left in no apparent distress sitting up in chair and Call bell within

## 2023-07-19 NOTE — ED NOTES
1502-Patient off floor for CT scan     1526- Headband: applied  Date/Time: 07/19/2023  Recorder: recording started  Skin: intact  Highest Seizure [place] Percentage past hour: N/A      General info regarding Seizure [place] %:  Minimum duration of study is 2 hours. If Seizure [place] has remained 0% throughout the entire 2-hour duration, communicate with provider to stop the recording. Seizure [place] 0-10% - Continue to monitor and complete 2-hour study. Seizure [place] 11-89% - Epileptiform activity present. Notify provider for next steps. Seizure [place] >/= 90% - Epileptiform activity consistent with Status Epilepticus. Immediately notify provider. *Patients with Seizure [place] above 10% that persists may require a study longer than 2 hours. Maximum recording duration is 24 hours. Please update provider with a persistent increase in Seizure [place] above 10%. 5- Headband: remains in place  Date/Time: 7/19/2023 at 1627  Recorder: continuing to record  Skin: intact  Highest Seizure [place] Percentage past hour: 0%      General info regarding Seizure [place] %:  Minimum duration of study is 2 hours. If Seizure [place] has remained 0% throughout the entire 2-hour duration, communicate with provider to stop the recording. Seizure [place] 0-10% - Continue to monitor and complete 2-hour study. Seizure [place] 11-89% - Epileptiform activity present. Notify provider for next steps. Seizure [place] >/= 90% - Epileptiform activity consistent with Status Epilepticus. Immediately notify provider. *Patients with Seizure [place] above 10% that persists may require a study longer than 2 hours. Maximum recording duration is 24 hours. Please update provider with a persistent increase in Seizure [place] above 10%.

## 2023-07-19 NOTE — PROGRESS NOTES
Speech pathology note  Note patient continuing to refuse MRI. Checked in with patient who denied dysphagia, and RN confirmed. Patient also reported that speech/language function has returned to baseline. Formal SLP evaluation not clinically indicated at this time, therefore SLP will sign off. Please re-consult if further needs arise. Thank you.     Naty Minor., CCC-SLP

## 2023-07-19 NOTE — PROGRESS NOTES
[facility]                                                                               Hospitalist Progress Note  Eliot Payne MD          Date of Service:  2023  NAME:  Fatou Veras  :  1955  MRN:  [id number]    Please note that this dictation was completed with Egnyte, the computer voice recognition software. Quite often unanticipated grammatical, syntax, homophones, and other interpretive errors are inadvertently transcribed by the computer software. Please disregard these errors. Please excuse any errors that have escaped final proofreading. Admission Summary: This is a 25-year-old man with a past medical history significant for chronic kidney disease, hypertension, substance abuse, presented at the [facility] Room with unsteady gait. The patient also complained of dizziness and problem forming words. The patient's symptoms started at about 2:00 p.m. on Monday but did not come into the emergency room until 12 hours later. The patient also stated that he has a history of neuropathy, which is also making his symptoms worse. The patient did not describe the dizziness as room spinning around him, but the dizziness is worse with ambulation associated with mild diffuse headache. The patient did not describe the headache as throbbing. The patient admits to use of cocaine prior to coming to the emergency room. When the patient arrived at the emergency room, the patient was outside the tPA window, code stroke level 2 was called. CT scan of the head was obtained. The CT scan was negative for acute pathology. The patient was seen by the teleneurologist at the request of the emergency room physician. The teleneurologist advised admission for stroke workup. The patient was referred to the hospitalist service for that purpose. No prior history of CVA.          Interval history / Subjective:   Patient patient is clopidogrel (PLAVIX) tablet 75 mg  75 mg Oral Daily    amLODIPine (NORVASC) tablet 5 mg  5 mg Oral Daily    gabapentin (NEURONTIN) capsule 300 mg  300 mg Oral BID    ondansetron (ZOFRAN-ODT) disintegrating tablet 4 mg  4 mg Oral Q8H PRN    Or    ondansetron (ZOFRAN) injection 4 mg  4 mg IntraVENous Q6H PRN    polyethylene glycol (GLYCOLAX) packet 17 g  17 g Oral Daily PRN    enoxaparin Sodium (LOVENOX) injection 30 mg  30 mg SubCUTAneous BID    aspirin EC tablet 81 mg  81 mg Oral Daily    Or    aspirin suppository 300 mg  300 mg Rectal Daily    acetaminophen (TYLENOL) tablet 650 mg  650 mg Oral Q4H PRN    Or    acetaminophen (TYLENOL) suppository 650 mg  650 mg Rectal Q4H PRN    atorvastatin (LIPITOR) tablet 40 mg  40 mg Oral Nightly    nicotine (NICODERM CQ) 21 MG/24HR 1 patch  1 patch TransDERmal Daily    meclizine (ANTIVERT) tablet 12.5 mg  12.5 mg Oral TID PRN    ipratropium 0.5 mg-albuterol 2.5 mg (DUONEB) nebulizer solution 1 Dose  1 Dose Inhalation Q4H PRN    butalbital-acetaminophen-caffeine (FIORICET, ESGIC) per tablet 1 tablet  1 tablet Oral Q4H PRN    budesonide (PULMICORT) nebulizer suspension 500 mcg  0.5 mg Nebulization BID    arformoterol tartrate (BROVANA) nebulizer solution 15 mcg  15 mcg Nebulization BID    albuterol (PROVENTIL) (2.5 MG/3ML) 0.083% nebulizer solution 2.5 mg  2.5 mg Nebulization BID    prochlorperazine (COMPAZINE) injection 10 mg  10 mg IntraVENous Q6H PRN     ______________________________________________________________________  EXPECTED LENGTH OF STAY: 2  ACTUAL LENGTH OF STAY:          1                 Davi Noel MD

## 2023-07-19 NOTE — ED NOTES
1932- Bedside and Verbal shift change report given to terrence (oncoming nurse) by Ferdinand Stroud (offgoing nurse). Report included the following information Nurse Handoff Report, Index, ED SBAR, Intake/Output, MAR, Recent Results, and Alarm Parameters.

## 2023-07-19 NOTE — PLAN OF CARE
Problem: Physical Therapy - Adult  Goal: By Discharge: Performs mobility at highest level of function for planned discharge setting. See evaluation for individualized goals. Description: FUNCTIONAL STATUS PRIOR TO ADMISSION: Patient was independent and active without use of DME.    HOME SUPPORT PRIOR TO ADMISSION: The patient lived alone with no local support. Physical Therapy Goals  Initiated 7/18/2023  1. Patient will perform sit to stand with independence within 7 day(s). 2.  Patient will transfer from bed to chair and chair to bed with independence using the least restrictive device within 7 day(s). 3.  Patient will ambulate with independence for 150 feet with the least restrictive device within 7 day(s). 4.  Patient will ascend/descend 10 stairs with unilateral handrail(s) with independence within 7 day(s). 5.  Patient will improve Patrick Balance score by 7 points within 7 days. Outcome: Progressing   PHYSICAL THERAPY TREATMENT    Patient: Rachel Mota (14 y.o. male)  Date: 7/19/2023  Diagnosis: Acute hypokalemia [E87.6]  Expressive aphasia [R47.01]  Cocaine abuse (HCC) [F14.10]  Gait instability [R26.81]  Postural dizziness [R42]  SILVIA (acute kidney injury) ([address]) [N17.9]  Acute CVA (cerebrovascular accident) ([address]) [I63.9] Acute CVA (cerebrovascular accident) [facility])      Precautions: Fall risk    ASSESSMENT:  Patient continues to benefit from skilled PT services and is progressing towards goals. Patient initially required David and rolling walker for ambulation, but was able to ambulate independently without assistive device once he used neoprene strap for compression over proximal R lower leg to manage sciatica pain. At this point patient appears to be near his baseline mobility with dizziness and intermittent headache symptoms being his only limitations. PLAN:  Patient continues to benefit from skilled intervention to address the above impairments.   Continue treatment per established plan

## 2023-07-19 NOTE — PLAN OF CARE
Problem: Occupational Therapy - Adult  Goal: By Discharge: Performs self-care activities at highest level of function for planned discharge setting. See evaluation for individualized goals. Description: FUNCTIONAL STATUS PRIOR TO ADMISSION:  Patient was ambulatory without equipment, reports fluctuation in ability to walk due to sciatica, has had tx and been shown exercises, reports he is able to ride his bike without pain, has meals on wheels deliver one meal a day   ,  ,  ,  ,  ,  ,  ,  ,  ,  , Active : Yes     HOME SUPPORT: Patient lived alone with a friend to provide assistance. Reports a friend cooks at times for him    Occupational Therapy Goals:  Initiated 7/19/2023  1. Patient will perform grooming with [place] standing at the sink within 7 day(s). 2.  Patient will perform upper body dressing and lower body dressing with [place] within 7 day(s). 3.  Patient will perform simple home management with [place] within 7 day(s). 4.  Patient will perform toilet transfers with [place]  within 7 day(s). 5.  Patient will perform all aspects of toileting with [place] within 7 day(s). 6.  Patient will perform bilateral UE coordination and strengthening exercises independently within 7 day(s).    7/19/2023 1149 by SEEMA Edge/L  Outcome: Not Progressing

## 2023-07-20 VITALS
TEMPERATURE: 98 F | WEIGHT: 254.4 LBS | BODY MASS INDEX: 34.46 KG/M2 | RESPIRATION RATE: 18 BRPM | SYSTOLIC BLOOD PRESSURE: 141 MMHG | HEART RATE: 76 BPM | OXYGEN SATURATION: 96 % | HEIGHT: 72 IN | DIASTOLIC BLOOD PRESSURE: 82 MMHG

## 2023-07-20 PROCEDURE — 94640 AIRWAY INHALATION TREATMENT: CPT

## 2023-07-20 PROCEDURE — G0378 HOSPITAL OBSERVATION PER HR: HCPCS

## 2023-07-20 PROCEDURE — 6370000000 HC RX 637 (ALT 250 FOR IP): Performed by: INTERNAL MEDICINE

## 2023-07-20 PROCEDURE — 6370000000 HC RX 637 (ALT 250 FOR IP): Performed by: STUDENT IN AN ORGANIZED HEALTH CARE EDUCATION/TRAINING PROGRAM

## 2023-07-20 PROCEDURE — 6360000002 HC RX W HCPCS: Performed by: STUDENT IN AN ORGANIZED HEALTH CARE EDUCATION/TRAINING PROGRAM

## 2023-07-20 RX ORDER — BUTALBITAL, ACETAMINOPHEN AND CAFFEINE 50; 325; 40 MG/1; MG/1; MG/1
1 TABLET ORAL EVERY 4 HOURS PRN
Qty: 180 TABLET | Refills: 3 | Status: SHIPPED | OUTPATIENT
Start: 2023-07-20

## 2023-07-20 RX ORDER — GABAPENTIN 100 MG/1
100 CAPSULE ORAL 3 TIMES DAILY
Qty: 90 CAPSULE | Refills: 0 | Status: SHIPPED | OUTPATIENT
Start: 2023-07-20 | End: 2023-08-19

## 2023-07-20 RX ORDER — CLOPIDOGREL BISULFATE 75 MG/1
75 TABLET ORAL DAILY
Qty: 20 TABLET | Refills: 0 | Status: SHIPPED | OUTPATIENT
Start: 2023-07-21 | End: 2023-08-10

## 2023-07-20 RX ORDER — ATORVASTATIN CALCIUM 40 MG/1
40 TABLET, FILM COATED ORAL NIGHTLY
Qty: 30 TABLET | Refills: 3 | Status: SHIPPED | OUTPATIENT
Start: 2023-07-20

## 2023-07-20 RX ADMIN — ASPIRIN 81 MG: 81 TABLET, COATED ORAL at 09:51

## 2023-07-20 RX ADMIN — CLOPIDOGREL BISULFATE 75 MG: 75 TABLET, FILM COATED ORAL at 09:51

## 2023-07-20 RX ADMIN — GABAPENTIN 100 MG: 100 CAPSULE ORAL at 09:52

## 2023-07-20 RX ADMIN — BUDESONIDE INHALATION 500 MCG: 0.5 SUSPENSION RESPIRATORY (INHALATION) at 07:50

## 2023-07-20 RX ADMIN — ARFORMOTEROL TARTRATE 15 MCG: 15 SOLUTION RESPIRATORY (INHALATION) at 07:50

## 2023-07-20 RX ADMIN — ALBUTEROL SULFATE 2.5 MG: 2.5 SOLUTION RESPIRATORY (INHALATION) at 07:50

## 2023-07-20 RX ADMIN — AMLODIPINE BESYLATE 5 MG: 5 TABLET ORAL at 09:52

## 2023-07-20 ASSESSMENT — PAIN DESCRIPTION - DESCRIPTORS: DESCRIPTORS: SHARP

## 2023-07-20 ASSESSMENT — PAIN SCALES - GENERAL
PAINLEVEL_OUTOF10: 0
PAINLEVEL_OUTOF10: 1

## 2023-07-20 ASSESSMENT — PAIN DESCRIPTION - FREQUENCY: FREQUENCY: INTERMITTENT

## 2023-07-20 ASSESSMENT — PAIN DESCRIPTION - ORIENTATION: ORIENTATION: MID;LOWER

## 2023-07-20 ASSESSMENT — PAIN DESCRIPTION - LOCATION: LOCATION: BACK

## 2023-07-20 ASSESSMENT — PAIN DESCRIPTION - ONSET: ONSET: ON-GOING

## 2023-07-20 ASSESSMENT — PAIN - FUNCTIONAL ASSESSMENT: PAIN_FUNCTIONAL_ASSESSMENT: ACTIVITIES ARE NOT PREVENTED

## 2023-07-20 ASSESSMENT — PAIN DESCRIPTION - PAIN TYPE: TYPE: CHRONIC PAIN

## 2023-07-20 NOTE — PLAN OF CARE
Problem: Discharge Planning  Goal: Discharge to home or other facility with appropriate resources  7/20/2023 1132 by Amanda Arambula RN  Outcome: Adequate for Discharge  7/20/2023 0804 by Amanda Arambula RN  Outcome: Progressing  Flowsheets (Taken 7/20/2023 0745)  Discharge to home or other facility with appropriate resources:   Identify barriers to discharge with patient and caregiver   Arrange for needed discharge resources and transportation as appropriate   Identify discharge learning needs (meds, wound care, etc)   Arrange for interpreters to assist at discharge as needed   Refer to discharge planning if patient needs post-hospital services based on physician order or complex needs related to functional status, cognitive ability or social support system     Problem: Pain  Goal: Verbalizes/displays adequate comfort level or baseline comfort level  7/20/2023 1132 by Amanda Arambula RN  Outcome: Adequate for Discharge  7/20/2023 0804 by Amanda Arambula RN  Outcome: Progressing     Problem: Chronic Conditions and Co-morbidities  Goal: Patient's chronic conditions and co-morbidity symptoms are monitored and maintained or improved  7/20/2023 1132 by Amanda Arambula RN  Outcome: Adequate for Discharge  7/20/2023 0804 by Amanda Arambula RN  Outcome: Progressing  Flowsheets (Taken 7/20/2023 0745)  Care Plan - Patient's Chronic Conditions and Co-Morbidity Symptoms are Monitored and Maintained or Improved:   Monitor and assess patient's chronic conditions and comorbid symptoms for stability, deterioration, or improvement   Collaborate with multidisciplinary team to address chronic and comorbid conditions and prevent exacerbation or deterioration   Update acute care plan with appropriate goals if chronic or comorbid symptoms are exacerbated and prevent overall improvement and discharge     Problem: ABCDS Injury Assessment  Goal: Absence of physical injury  7/20/2023 1132 by Amanda Arambula RN  Outcome: Adequate for Discharge  7/20/2023 0804 by Prem Otero RN  Outcome: Progressing     Problem: Safety - Adult  Goal: Free from fall injury  7/20/2023 1132 by Prem Otero RN  Outcome: Adequate for Discharge  7/20/2023 0804 by Prem Otero RN  Outcome: Progressing     Problem: Neurosensory - Adult  Goal: Achieves stable or improved neurological status  7/20/2023 1132 by Prem Otero RN  Outcome: Adequate for Discharge  7/20/2023 0804 by Prem Otero RN  Outcome: Progressing  Flowsheets (Taken 7/20/2023 0745)  Achieves stable or improved neurological status:   Assess for and report changes in neurological status   Initiate measures to prevent increased intracranial pressure   Maintain blood pressure and fluid volume within ordered parameters to optimize cerebral perfusion and minimize risk of hemorrhage   Monitor temperature, glucose, and sodium. Initiate appropriate interventions as ordered  Goal: Absence of seizures  7/20/2023 1132 by Prem Otero RN  Outcome: Adequate for Discharge  7/20/2023 0804 by Prem Otero RN  Outcome: Progressing  Flowsheets (Taken 7/20/2023 0745)  Absence of seizures:   Monitor for seizure activity.   If seizure occurs, document type and location of movements and any associated apnea   If seizure occurs, turn head to side and suction secretions as needed   Administer anticonvulsants as ordered   Support airway/breathing, administer oxygen as needed   Diagnostic studies as ordered  Goal: Remains free of injury related to seizures activity  7/20/2023 1132 by Prem Otero RN  Outcome: Adequate for Discharge  7/20/2023 0804 by Prem Otero RN  Outcome: Progressing  Flowsheets (Taken 7/20/2023 0745)  Remains free of injury related to seizure activity:   Maintain airway, patient safety  and administer oxygen as ordered   Monitor patient for seizure activity, document and report duration and description of seizure to Licensed Independent Practitioner   If seizure occurs, turn patient

## 2023-07-20 NOTE — DISCHARGE SUMMARY
or 0.5MG/DOS)     predniSONE 5 MG tablet  Commonly known as: DELTASONE     rizatriptan 10 MG disintegrating tablet  Commonly known as: MAXALT-MLT     tamsulosin 0.4 MG capsule  Commonly known as: FLOMAX               Where to Get Your Medications        These medications were sent to [address], [address] [phone]  [address], [address]      Phone: [phone]   atorvastatin 40 MG tablet  butalbital-acetaminophen-caffeine -40 MG per tablet  clopidogrel 75 MG tablet  gabapentin 100 MG capsule           NOTIFY YOUR PHYSICIAN FOR ANY OF THE FOLLOWING:   Fever over 101 degrees for 24 hours. Chest pain, shortness of breath, fever, chills, nausea, vomiting, diarrhea, change in mentation, falling, weakness, bleeding. Severe pain or pain not relieved by medications. Or, any other signs or symptoms that you may have questions about.     DISPOSITION:    Home With:   OT  PT  HH  RN       Long term SNF/Inpatient Rehab   x Independent/assisted living    Hospice    Other:       PATIENT CONDITION AT DISCHARGE:     Functional status    Poor     Deconditioned    x Independent      Cognition     Lucid    x Forgetful     Dementia      Catheters/lines (plus indication)    Mancera     PICC     PEG    x None      Code status    x Full code     DNR      PHYSICAL EXAMINATION AT DISCHARGE:    General : alert x 3, awake, no acute distress,   HEENT: PEERL, EOMI, moist mucus membrane, TM clear  Neck: supple, no JVD, no meningeal signs  Chest: Clear to auscultation bilaterally   CVS: S1 S2 heard, Capillary refill less than 2 seconds  Abd: soft/ Non tender, non distended, BS physiological,   Ext: no clubbing, no cyanosis, no edema, brisk 2+ DP pulses  Neuro/Psych: pleasant mood and affect, CN 2-12 grossly intact, sensory grossly within normal limit, Strength 5/5 in all extremities, DTR 1+ x 4  Skin: warm     CHRONIC MEDICAL DIAGNOSES:      Greater than 31 minutes were spent with the patient on counseling and coordination of care    Signed:   Toya Goss MD  7/20/2023  12:39 PM

## 2023-07-20 NOTE — PLAN OF CARE
Problem: Discharge Planning  Goal: Discharge to home or other facility with appropriate resources  Outcome: Progressing  Flowsheets (Taken 7/20/2023 0745)  Discharge to home or other facility with appropriate resources:   Identify barriers to discharge with patient and caregiver   Arrange for needed discharge resources and transportation as appropriate   Identify discharge learning needs (meds, wound care, etc)   Arrange for interpreters to assist at discharge as needed   Refer to discharge planning if patient needs post-hospital services based on physician order or complex needs related to functional status, cognitive ability or social support system     Problem: Pain  Goal: Verbalizes/displays adequate comfort level or baseline comfort level  Outcome: Progressing     Problem: Chronic Conditions and Co-morbidities  Goal: Patient's chronic conditions and co-morbidity symptoms are monitored and maintained or improved  Outcome: Progressing  Flowsheets (Taken 7/20/2023 0745)  Care Plan - Patient's Chronic Conditions and Co-Morbidity Symptoms are Monitored and Maintained or Improved:   Monitor and assess patient's chronic conditions and comorbid symptoms for stability, deterioration, or improvement   Collaborate with multidisciplinary team to address chronic and comorbid conditions and prevent exacerbation or deterioration   Update acute care plan with appropriate goals if chronic or comorbid symptoms are exacerbated and prevent overall improvement and discharge     Problem: ABCDS Injury Assessment  Goal: Absence of physical injury  Outcome: Progressing     Problem: Safety - Adult  Goal: Free from fall injury  Outcome: Progressing     Problem: Neurosensory - Adult  Goal: Achieves stable or improved neurological status  Outcome: Progressing  Flowsheets (Taken 7/20/2023 0745)  Achieves stable or improved neurological status:   Assess for and report changes in neurological status   Initiate measures to prevent increased 46)  Return ADL Status to a Safe Level of Function:   Administer medication as ordered   Assess activities of daily living deficits and provide assistive devices as needed   Obtain physical therapy/occupational therapy consults as needed   Assist and instruct patient to increase activity and self care as tolerated

## 2023-07-20 NOTE — PROGRESS NOTES
Pt on remote cardiac monitoring. Pt does not have a current telemetry order, but does have telemetry LOC.

## 2023-07-20 NOTE — CARE COORDINATION
Late entry for 7/19/23    BEST    RUR-11%    PLAN  PCP,Neurology,Transportation    CM met with MD and team at [facility] to discuss plan. CM followed up with Patient to discuss distance of Neurology appointment and no public transportation access. Informed patient that Medicaid transport could be arranged through  for appointment that is scheduled for Friday 7/21/23 as it will be a hospital follow up appointment otherwise [address] wants at least 5 days in advance to schedule. He stated that he would Like  to schedule pick ups for him for [facility] and for PCP appointment with Kojo Bourgeois.     CM scheduled transportation  for both and added to AVS.     time from home will be 1145 am and 230 from [facility] Trip # is [phone]     Dr. Audrey Ellington up [phone] from Home Answer phone Trip# [phone]       Motive care for His Medicaid's phone number is [phone]    Yolanda Andrew RN, CM

## 2023-07-20 NOTE — PROGRESS NOTES
0715-Bedside and Verbal shift change report given to bruna (oncoming nurse) by Dilshad Conde (offgoing nurse). Report included the following information Index, ED SBAR, MAR, and Recent Results. 0286- Patient seen and assessed. Patient reports headache, dizziness and blurred vision resolved this morning. Patient also reports back pain r/t chronic sciatica is down to a 1/10. Patient continued to report n&t to right fingers. No additional complaints at this time respiratory at bedside. 1026- per MD fonseca patient ok for discharge home.  Ok to d/c monitor prior to d/c

## 2023-07-20 NOTE — DISCHARGE INSTR - DIET

## 2023-07-20 NOTE — PROGRESS NOTES
AVS reviewed with the pt and the pt demonstrated understanding of upcoming appts and prescription information. Pt escorted via wheelchair to the ED entrance and assisted with setting up his bike. Pt to walk his bike home with his belongings in hand.

## 2023-07-20 NOTE — CARE COORDINATION
Transition of Care Plan:    RUR:   Prior Level of Functioning:   Disposition: HOME  Follow up appointments: PCP,August 8th at 1045 am, Neurology-7/21 at 1:00 PM with Chace Hernandez NP.  DME needed: N/A  Transportation at discharge: SELF  IM/IMM Medicare/ letter given:   Is patient a  and connected with VA? NO  Barriers to discharge: N/A    CM advised patient that doctor stated he does not want patient riding bicycle home due to recent dizziness and that patient does not have a helmet. CM offered to set up transportation with medicaid transport and he refused and stated that he will walk his bike home. CM educated patient on the importance of riding bike with helmet when he is medically stable to ride and following up with his appointments. CM also reminded him of the ability to use medicaid transportation services as well as to follow up with Medicare if needed to see if they have available transportation services and he stated he believes he has a voucher for ride services to use from medicare and is not sure as too how much is on there and will use it when he feels it is necessary.     Juwan Rosen RN CM

## 2023-07-20 NOTE — DISCHARGE INSTRUCTIONS
- Please take aspirin and Plavix for 20 days then take aspirin indefinitely   - Please take gabapentin for chronic sciatica \  - follow up PCP

## 2023-07-20 NOTE — PROCEDURES
RAPID ELECTROENCEPHALOGRAM REPORT  [facility]    Procedure ID: [id number] Procedure Date: 7/19/2023   Patient Name: Agata Murray YOB: 1955   Procedure Type: Derrick Rapid EEG Medical Record No: [id number]     Study Duration: 1 hour 57 minutes  Recording Start Time: 3:26 PM   Recording End Time:5:23 PM    History: Question seizure    Medications: Not listed    Description of procedure: This EEG was obtained using a 10 lead, 8 channel system positioned circumferentially without any parasagittal coverage (rapid EEG). Computer selected EEG is reviewed as well as background features and all clinically significant events. Clarity algorithm utilized and implemented to provide analysis of underlying activity and seizure detection used to facilitate reading. Description of recording: This tracing is obtained during the awake and drowsy states. During wakefulness there are brief intermittent runs of posteriorly dominant and symmetric low to medium amplitude 8 cps activities which attenuate with eye opening. Lower voltage faster frequency activities are seen symmetrically over the anterior head regions. During drowsiness, the background rhythms attenuate and replaced with diffuse symmetric theta range activities. Sleep architecture is not seen. Impression: Normal rapid EEG during wakefulness and drowsiness. Comment: A normal EEG does not rule out the diagnosis of a seizure disorder; clinical  correlation is advised. If there is still persistent suspicion for continued seizure-like  activity, would advise obtaining an electroencephalogram with the 10-20 international  system for improved spatial resolution and parasagittal coverage.     Rosita Anders MD

## 2023-07-21 ENCOUNTER — OFFICE VISIT (OUTPATIENT)
Age: 68
End: 2023-07-21
Payer: MEDICARE

## 2023-07-21 VITALS
RESPIRATION RATE: 18 BRPM | OXYGEN SATURATION: 97 % | DIASTOLIC BLOOD PRESSURE: 80 MMHG | HEIGHT: 72 IN | WEIGHT: 261.8 LBS | SYSTOLIC BLOOD PRESSURE: 124 MMHG | HEART RATE: 88 BPM | BODY MASS INDEX: 35.46 KG/M2 | TEMPERATURE: 98 F

## 2023-07-21 DIAGNOSIS — F07.81 POSTCONCUSSIONAL SYNDROME: ICD-10-CM

## 2023-07-21 DIAGNOSIS — Z09 HOSPITAL DISCHARGE FOLLOW-UP: ICD-10-CM

## 2023-07-21 DIAGNOSIS — I63.9 CEREBROVASCULAR ACCIDENT (CVA), UNSPECIFIED MECHANISM (HCC): Primary | ICD-10-CM

## 2023-07-21 DIAGNOSIS — G62.9 POLYNEUROPATHY, UNSPECIFIED: ICD-10-CM

## 2023-07-21 PROCEDURE — 99215 OFFICE O/P EST HI 40 MIN: CPT

## 2023-07-21 PROCEDURE — 1123F ACP DISCUSS/DSCN MKR DOCD: CPT

## 2023-07-21 RX ORDER — MELOXICAM 7.5 MG/1
7.5 TABLET ORAL DAILY
COMMUNITY

## 2023-07-21 RX ORDER — FINASTERIDE 5 MG/1
5 TABLET, FILM COATED ORAL DAILY
COMMUNITY
Start: 2022-09-08

## 2023-07-21 ASSESSMENT — ENCOUNTER SYMPTOMS
RESPIRATORY NEGATIVE: 1
ALLERGIC/IMMUNOLOGIC NEGATIVE: 1
GASTROINTESTINAL NEGATIVE: 1

## 2023-07-21 ASSESSMENT — PATIENT HEALTH QUESTIONNAIRE - PHQ9
SUM OF ALL RESPONSES TO PHQ QUESTIONS 1-9: 0
1. LITTLE INTEREST OR PLEASURE IN DOING THINGS: 0
2. FEELING DOWN, DEPRESSED OR HOPELESS: 0
SUM OF ALL RESPONSES TO PHQ9 QUESTIONS 1 & 2: 0
SUM OF ALL RESPONSES TO PHQ QUESTIONS 1-9: 0

## 2023-07-21 NOTE — ASSESSMENT & PLAN NOTE
Patient description of headache type pain does not have any migrainous feature. Patient denies any headaches but described symptoms as nagging pain. He was not aware that Fioricet was prescribed for headaches and has not been taking it. The indication of Fioricet was reviewed with patient today. He was advised to limit taking Fioricet no more than 2 to 3 days a week to prevent rebound headaches. We discussed the importance of a proper diet including plenty of fruits and vegetables as this can help with headache prevention. We discussed the importance of staying hydrated and drinking at least 32 to 64 ounces of water daily as this can be a cause of tension type headaches. We also discussed the importance of keeping a headache journal or log to identify triggers. We discussed the importance of sleep hygiene and attempting to get at least 6 to 8 hours of sleep daily to help with headache prevention.

## 2023-07-21 NOTE — PROGRESS NOTES
level above most significant at  L4-5 and L5-S1  ,    CAT Scan Result (most recent):  CT HEAD WO CONTRAST 07/19/2023    Narrative  EXAM: CT HEAD WO CONTRAST    INDICATION: follow up CT for CVA, patient refusing MRI    COMPARISON: 7/18/2023. CONTRAST: None. TECHNIQUE: Unenhanced CT of the head was performed using 5 mm images. Brain and  bone windows were generated. Coronal and sagittal reformats. CT dose reduction  was achieved through use of a standardized protocol tailored for this  examination and automatic exposure control for dose modulation. FINDINGS:  The ventricles and sulci are normal in size, shape and configuration for age. Periventricular white matter low-density is moderately severe, patchy and  diffuse, roughly stable. There is no intracranial hemorrhage, extra-axial  collection, or mass effect. The basilar cisterns are open. No CT evidence of new  acute infarct. The bone windows demonstrate no abnormalities. The visualized portions of the  paranasal sinuses and mastoid air cells are clear. Impression  No acute intracranial abnormality is confirmed by CT. Stable microvascular  ischemic and age-related change. Brain MRI would again be recommended if further  evaluation for ischemia is desired. CT HEAD WO CONTRAST 07/18/2023    Narrative  EXAM: CT CODE NEURO HEAD WO CONTRAST    INDICATION: Stroke    COMPARISON: 3/1/2022. CONTRAST: None. TECHNIQUE: Unenhanced CT of the head was performed using 5 mm images. Brain and  bone windows were generated. Coronal and sagittal reformats. CT dose reduction  was achieved through use of a standardized protocol tailored for this  examination and automatic exposure control for dose modulation. FINDINGS:  The ventricles and sulci are normal in size, shape and configuration. There is  no significant injury in periventricular and subcortical white matter  hypodensities. No significant change in bilateral basal ganglia lacunae.  There  is no

## 2023-07-21 NOTE — ASSESSMENT & PLAN NOTE
Stable without recurrence of symptoms     Patient is to continue on aspirin 81 mg, Plavix 75 mg, and atorvastatin 40 mg daily. He denies any side effects. Patient's blood pressure was elevated today but he was asymptomatic. Patient was advised to recheck blood pressure at home. If it remains elevated, to reach out to PCP for better blood pressure management. Patient is to continue to work to all of his comorbid conditions as managed by PCP and other specialists as appropriate    RECOMMENDATIONS:  - BP goal is less than 140/90  - Goal HbA1c is less than 7.   - LDL less than 70     Stroke education was provided today in regards to risk factors for stroke and lifestyle modifications to help minimize the risk of future stroke. This included medication compliance, regular follow up with primary care physician,  and healthy lifestyle habits (nutrition/exercise).

## 2023-07-21 NOTE — ASSESSMENT & PLAN NOTE
Patient verbalized he has been doing well since leaving the hospital.    He continues to experience slight weakness in his right upper and lower extremity which has improved. Based on the patient's symptoms, he may possibly have a stroke but patient refused to have brain MRI done at the hospital and the diagnosis for stroke was never confirmed. Head CT was unremarkable. Patient did not need any physical therapy posthospitalization and has been doing well. At the time of my evaluation today, patient has full strength in all extremities. .  No weakness was noted. Stroke work-up completed with the exception of brain MRI. No additional intervention needed at this time. Patient is to continue to remain active, continue to take his medication as prescribed, and continue to follow-up with PCP and other specialists as appropriate.

## 2023-07-21 NOTE — ASSESSMENT & PLAN NOTE
Stable    Patient is to continue take gabapentin 100 mg 3 times a day as ordered. Teaching about adopting a healthy lifestyle which included nutrition and exercise to help alleviate symptoms was discussed with patient.

## 2023-07-21 NOTE — PATIENT INSTRUCTIONS
As per our discussion,    Overall, you are doing very well. We are not sure whether he had a stroke or not because the head CT did not show that you had1 and sometimes we need a brain MRI to confirm stroke. Since you have deferred to have a brain MRI, will treat you as someone who had a stroke. Please continue on aspirin 81 mg Plavix 75 mg and atorvastatin 40 mg daily. Continue to work to all of his comorbid conditions as managed by PCP and other specialists as appropriate    RECOMMENDATIONS:  - BP goal is less than 140/90  - Goal HbA1c is less than 7.   - LDL less than 70      Stroke education was provided today in regards to risk factors for stroke and lifestyle modifications to help minimize the risk of future stroke. This included medication compliance, regular follow up with primary care physician,  and healthy lifestyle habits (nutrition/exercise). It was a pleasure meeting you today. I will see you back in 8 to 9 months or sooner if needed. Please do not hesitate to reach out for any questions or concerns.

## 2023-08-20 PROBLEM — Z09 HOSPITAL DISCHARGE FOLLOW-UP: Status: RESOLVED | Noted: 2023-07-21 | Resolved: 2023-08-20

## 2024-04-17 ENCOUNTER — TELEPHONE (OUTPATIENT)
Facility: HOSPITAL | Age: 69
End: 2024-04-17

## 2024-04-17 NOTE — TELEPHONE ENCOUNTER
BEST     Received 4-5 calls from this patient- [phone]  Finally able to connect yesterday and again today.   He could not talk yesterday due to having someone with him and did not want his request to be heard.   He Called back again today.   He is requesting recovery Resources for inpatient 30 day or longer program.     He indicates his use has gotten out of controlled- severely impacted his day to day life.  Bills.  Water cut off- trying to get help. Light bill overdue- now got help from a program.  Insurance on his home.  Discussed other resources for this.     Expressed some concerns for his safety- owing money to  his supplier.     We discussed options- We reviewed his insurance - Medicare Advantage plan- Medicaid limited coverage - and he indicates he may be losing this secondary coverage.     Patient is aware I will make calls to see what is available and have someone reach back out to him.     Made several calls- placement will be impacted by his payor source.     Talked to Ms. Fairchild [phone] Peer .  Discussed the situation - She will call the patient -and get back with me.     She did call and left a VM.      Kylee Dejesus MSW RN     [phone]

## 2024-04-18 ENCOUNTER — APPOINTMENT (OUTPATIENT)
Facility: HOSPITAL | Age: 69
DRG: 149 | End: 2024-04-18
Payer: MEDICARE

## 2024-04-18 ENCOUNTER — HOSPITAL ENCOUNTER (INPATIENT)
Facility: HOSPITAL | Age: 69
LOS: 4 days | Discharge: HOME | DRG: 149 | End: 2024-04-22
Attending: EMERGENCY MEDICINE | Admitting: INTERNAL MEDICINE
Payer: MEDICARE

## 2024-04-18 DIAGNOSIS — I63.50 CEREBROVASCULAR ACCIDENT (CVA) INVOLVING POSTERIOR CIRCULATION (HCC): ICD-10-CM

## 2024-04-18 DIAGNOSIS — F14.10 COCAINE ABUSE (HCC): ICD-10-CM

## 2024-04-18 DIAGNOSIS — R09.89 SUSPECTED CEREBROVASCULAR ACCIDENT (CVA): Primary | ICD-10-CM

## 2024-04-18 DIAGNOSIS — M54.31 SCIATICA OF RIGHT SIDE: ICD-10-CM

## 2024-04-18 LAB
ALBUMIN SERPL-MCNC: 3.6 G/DL (ref 3.5–5)
ALBUMIN/GLOB SERPL: 1 (ref 1.1–2.2)
ALP SERPL-CCNC: 94 U/L (ref 45–117)
ALT SERPL-CCNC: 60 U/L (ref 12–78)
ANION GAP SERPL CALC-SCNC: 11 MMOL/L (ref 5–15)
AST SERPL-CCNC: 22 U/L (ref 15–37)
BASOPHILS # BLD: 0.1 K/UL (ref 0–0.1)
BASOPHILS NFR BLD: 1 % (ref 0–1)
BILIRUB SERPL-MCNC: 0.6 MG/DL (ref 0.2–1)
BUN SERPL-MCNC: 17 MG/DL (ref 6–20)
BUN/CREAT SERPL: 13 (ref 12–20)
CALCIUM SERPL-MCNC: 9.6 MG/DL (ref 8.5–10.1)
CHLORIDE SERPL-SCNC: 107 MMOL/L (ref 97–108)
CO2 SERPL-SCNC: 30 MMOL/L (ref 21–32)
CREAT SERPL-MCNC: 1.33 MG/DL (ref 0.7–1.3)
DIFFERENTIAL METHOD BLD: ABNORMAL
EKG ATRIAL RATE: 63 BPM
EKG DIAGNOSIS: ABNORMAL
EKG P AXIS: 62 DEGREES
EKG P-R INTERVAL: 144 MS
EKG Q-T INTERVAL: 460 MS
EKG QRS DURATION: 100 MS
EKG QTC CALCULATION (BAZETT): 470 MS
EKG R AXIS: -33 DEGREES
EKG T AXIS: 11 DEGREES
EKG VENTRICULAR RATE: 63 BPM
EOSINOPHIL # BLD: 0 K/UL (ref 0–0.4)
EOSINOPHIL NFR BLD: 0 % (ref 0–7)
ERYTHROCYTE [DISTWIDTH] IN BLOOD BY AUTOMATED COUNT: 13.5 % (ref 11.5–14.5)
GLOBULIN SER CALC-MCNC: 3.6 G/DL (ref 2–4)
GLUCOSE BLD STRIP.AUTO-MCNC: 133 MG/DL (ref 65–117)
GLUCOSE SERPL-MCNC: 142 MG/DL (ref 65–100)
HCT VFR BLD AUTO: 45.5 % (ref 36.6–50.3)
HGB BLD-MCNC: 15.2 G/DL (ref 12.1–17)
IMM GRANULOCYTES # BLD AUTO: 0 K/UL (ref 0–0.04)
IMM GRANULOCYTES NFR BLD AUTO: 0 % (ref 0–0.5)
INR PPP: 1.1 (ref 0.9–1.1)
LYMPHOCYTES # BLD: 1 K/UL (ref 0.8–3.5)
LYMPHOCYTES NFR BLD: 10 % (ref 12–49)
MCH RBC QN AUTO: 29.7 PG (ref 26–34)
MCHC RBC AUTO-ENTMCNC: 33.4 G/DL (ref 30–36.5)
MCV RBC AUTO: 88.9 FL (ref 80–99)
MONOCYTES # BLD: 0.6 K/UL (ref 0–1)
MONOCYTES NFR BLD: 6 % (ref 5–13)
NEUTS SEG # BLD: 7.8 K/UL (ref 1.8–8)
NEUTS SEG NFR BLD: 83 % (ref 32–75)
NRBC # BLD: 0 K/UL (ref 0–0.01)
NRBC BLD-RTO: 0 PER 100 WBC
PLATELET # BLD AUTO: 272 K/UL (ref 150–400)
PMV BLD AUTO: 9.4 FL (ref 8.9–12.9)
POTASSIUM SERPL-SCNC: 3.7 MMOL/L (ref 3.5–5.1)
PROT SERPL-MCNC: 7.2 G/DL (ref 6.4–8.2)
PROTHROMBIN TIME: 11.3 SEC (ref 9–11.1)
RBC # BLD AUTO: 5.12 M/UL (ref 4.1–5.7)
SERVICE CMNT-IMP: ABNORMAL
SODIUM SERPL-SCNC: 148 MMOL/L (ref 136–145)
TROPONIN I SERPL HS-MCNC: 9 NG/L (ref 0–76)
WBC # BLD AUTO: 9.4 K/UL (ref 4.1–11.1)

## 2024-04-18 PROCEDURE — 6360000002 HC RX W HCPCS: Performed by: INTERNAL MEDICINE

## 2024-04-18 PROCEDURE — 70450 CT HEAD/BRAIN W/O DYE: CPT

## 2024-04-18 PROCEDURE — 85025 COMPLETE CBC W/AUTO DIFF WBC: CPT

## 2024-04-18 PROCEDURE — 94644 CONT INHLJ TX 1ST HOUR: CPT

## 2024-04-18 PROCEDURE — 84484 ASSAY OF TROPONIN QUANT: CPT

## 2024-04-18 PROCEDURE — 6370000000 HC RX 637 (ALT 250 FOR IP): Performed by: INTERNAL MEDICINE

## 2024-04-18 PROCEDURE — 93005 ELECTROCARDIOGRAM TRACING: CPT | Performed by: EMERGENCY MEDICINE

## 2024-04-18 PROCEDURE — 80053 COMPREHEN METABOLIC PANEL: CPT

## 2024-04-18 PROCEDURE — 6360000002 HC RX W HCPCS: Performed by: EMERGENCY MEDICINE

## 2024-04-18 PROCEDURE — 85610 PROTHROMBIN TIME: CPT

## 2024-04-18 PROCEDURE — 82962 GLUCOSE BLOOD TEST: CPT

## 2024-04-18 PROCEDURE — 94640 AIRWAY INHALATION TREATMENT: CPT

## 2024-04-18 PROCEDURE — 99285 EMERGENCY DEPT VISIT HI MDM: CPT

## 2024-04-18 PROCEDURE — 36415 COLL VENOUS BLD VENIPUNCTURE: CPT

## 2024-04-18 PROCEDURE — 94664 DEMO&/EVAL PT USE INHALER: CPT

## 2024-04-18 PROCEDURE — 2580000003 HC RX 258: Performed by: INTERNAL MEDICINE

## 2024-04-18 PROCEDURE — 1200000000 HC SEMI PRIVATE

## 2024-04-18 RX ORDER — POLYETHYLENE GLYCOL 3350 17 G/17G
17 POWDER, FOR SOLUTION ORAL DAILY PRN
Status: DISCONTINUED | OUTPATIENT
Start: 2024-04-18 | End: 2024-04-22 | Stop reason: HOSPADM

## 2024-04-18 RX ORDER — ARFORMOTEROL TARTRATE 15 UG/2ML
15 SOLUTION RESPIRATORY (INHALATION)
Status: DISCONTINUED | OUTPATIENT
Start: 2024-04-18 | End: 2024-04-22 | Stop reason: HOSPADM

## 2024-04-18 RX ORDER — ENOXAPARIN SODIUM 100 MG/ML
30 INJECTION SUBCUTANEOUS 2 TIMES DAILY
Status: DISCONTINUED | OUTPATIENT
Start: 2024-04-18 | End: 2024-04-22 | Stop reason: HOSPADM

## 2024-04-18 RX ORDER — ASPIRIN 81 MG/1
81 TABLET, CHEWABLE ORAL
Status: DISCONTINUED | OUTPATIENT
Start: 2024-04-18 | End: 2024-04-18

## 2024-04-18 RX ORDER — ONDANSETRON 4 MG/1
4 TABLET, ORALLY DISINTEGRATING ORAL EVERY 8 HOURS PRN
Status: DISCONTINUED | OUTPATIENT
Start: 2024-04-18 | End: 2024-04-22 | Stop reason: HOSPADM

## 2024-04-18 RX ORDER — FINASTERIDE 5 MG/1
5 TABLET, FILM COATED ORAL DAILY
Status: DISCONTINUED | OUTPATIENT
Start: 2024-04-18 | End: 2024-04-22 | Stop reason: HOSPADM

## 2024-04-18 RX ORDER — IPRATROPIUM BROMIDE AND ALBUTEROL SULFATE 2.5; .5 MG/3ML; MG/3ML
1 SOLUTION RESPIRATORY (INHALATION) EVERY 4 HOURS PRN
Status: DISCONTINUED | OUTPATIENT
Start: 2024-04-18 | End: 2024-04-22 | Stop reason: HOSPADM

## 2024-04-18 RX ORDER — ONDANSETRON 2 MG/ML
4 INJECTION INTRAMUSCULAR; INTRAVENOUS EVERY 6 HOURS PRN
Status: DISCONTINUED | OUTPATIENT
Start: 2024-04-18 | End: 2024-04-22 | Stop reason: HOSPADM

## 2024-04-18 RX ORDER — ASPIRIN 81 MG/1
81 TABLET, CHEWABLE ORAL DAILY
Status: DISCONTINUED | OUTPATIENT
Start: 2024-04-18 | End: 2024-04-20

## 2024-04-18 RX ORDER — TAMSULOSIN HYDROCHLORIDE 0.4 MG/1
0.4 CAPSULE ORAL DAILY
Status: DISCONTINUED | OUTPATIENT
Start: 2024-04-18 | End: 2024-04-22 | Stop reason: HOSPADM

## 2024-04-18 RX ORDER — SODIUM CHLORIDE 0.9 % (FLUSH) 0.9 %
5-40 SYRINGE (ML) INJECTION EVERY 12 HOURS SCHEDULED
Status: DISCONTINUED | OUTPATIENT
Start: 2024-04-18 | End: 2024-04-22 | Stop reason: HOSPADM

## 2024-04-18 RX ORDER — ATORVASTATIN CALCIUM 40 MG/1
80 TABLET, FILM COATED ORAL NIGHTLY
Status: DISCONTINUED | OUTPATIENT
Start: 2024-04-18 | End: 2024-04-22 | Stop reason: HOSPADM

## 2024-04-18 RX ORDER — LABETALOL HYDROCHLORIDE 5 MG/ML
10 INJECTION, SOLUTION INTRAVENOUS EVERY 10 MIN PRN
Status: DISCONTINUED | OUTPATIENT
Start: 2024-04-18 | End: 2024-04-22 | Stop reason: HOSPADM

## 2024-04-18 RX ORDER — SODIUM CHLORIDE 0.9 % (FLUSH) 0.9 %
5-40 SYRINGE (ML) INJECTION PRN
Status: DISCONTINUED | OUTPATIENT
Start: 2024-04-18 | End: 2024-04-22 | Stop reason: HOSPADM

## 2024-04-18 RX ORDER — ASPIRIN 300 MG/1
300 SUPPOSITORY RECTAL DAILY
Status: DISCONTINUED | OUTPATIENT
Start: 2024-04-18 | End: 2024-04-20

## 2024-04-18 RX ORDER — SODIUM CHLORIDE 9 MG/ML
INJECTION, SOLUTION INTRAVENOUS PRN
Status: DISCONTINUED | OUTPATIENT
Start: 2024-04-18 | End: 2024-04-22 | Stop reason: HOSPADM

## 2024-04-18 RX ORDER — ALBUTEROL SULFATE 2.5 MG/3ML
2.5 SOLUTION RESPIRATORY (INHALATION)
Status: COMPLETED | OUTPATIENT
Start: 2024-04-18 | End: 2024-04-18

## 2024-04-18 RX ORDER — TAMSULOSIN HYDROCHLORIDE 0.4 MG/1
0.4 CAPSULE ORAL DAILY
Status: ON HOLD | COMMUNITY
End: 2024-04-20 | Stop reason: HOSPADM

## 2024-04-18 RX ORDER — PREDNISONE 20 MG/1
20 TABLET ORAL 2 TIMES DAILY
Status: ON HOLD | COMMUNITY
End: 2024-04-20 | Stop reason: HOSPADM

## 2024-04-18 RX ORDER — GABAPENTIN 100 MG/1
100 CAPSULE ORAL 3 TIMES DAILY
Status: DISCONTINUED | OUTPATIENT
Start: 2024-04-18 | End: 2024-04-19

## 2024-04-18 RX ADMIN — ALBUTEROL SULFATE 2.5 MG: 2.5 SOLUTION RESPIRATORY (INHALATION) at 15:40

## 2024-04-18 RX ADMIN — ENOXAPARIN SODIUM 30 MG: 100 INJECTION SUBCUTANEOUS at 20:44

## 2024-04-18 RX ADMIN — SODIUM CHLORIDE, PRESERVATIVE FREE 10 ML: 5 INJECTION INTRAVENOUS at 20:43

## 2024-04-18 RX ADMIN — GABAPENTIN 100 MG: 100 CAPSULE ORAL at 20:43

## 2024-04-18 RX ADMIN — ATORVASTATIN CALCIUM 80 MG: 40 TABLET, FILM COATED ORAL at 20:43

## 2024-04-18 RX ADMIN — FINASTERIDE 5 MG: 5 TABLET, FILM COATED ORAL at 20:43

## 2024-04-18 RX ADMIN — TAMSULOSIN HYDROCHLORIDE 0.4 MG: 0.4 CAPSULE ORAL at 20:43

## 2024-04-18 RX ADMIN — ARFORMOTEROL TARTRATE 15 MCG: 15 SOLUTION RESPIRATORY (INHALATION) at 20:53

## 2024-04-18 ASSESSMENT — PAIN SCALES - GENERAL
PAINLEVEL_OUTOF10: 0
PAINLEVEL_OUTOF10: 9

## 2024-04-18 ASSESSMENT — PAIN DESCRIPTION - LOCATION: LOCATION: HEAD

## 2024-04-18 ASSESSMENT — PAIN DESCRIPTION - DESCRIPTORS: DESCRIPTORS: SHARP

## 2024-04-18 ASSESSMENT — PAIN - FUNCTIONAL ASSESSMENT: PAIN_FUNCTIONAL_ASSESSMENT: 0-10

## 2024-04-18 ASSESSMENT — PAIN DESCRIPTION - ORIENTATION: ORIENTATION: POSTERIOR

## 2024-04-18 NOTE — ED PROVIDER NOTES
[facility] EMERGENCY DEPT  EMERGENCY DEPARTMENT ENCOUNTER    Patient Name: Bruce Crawford  MRN: [id number]  YOB: 1955  Provider: Martinez Sanchez MD  PCP: Femi Barbour MD  Time/Date of evaluation: 1:56 PM EDT on 4/18/24    History of Presenting Illness     Chief Complaint   Patient presents with    Dizziness     History Provided by: Patient   History is limited by: Nothing    HISTORY (Narrative):   Bruce Crawford is a 69 y.o. male with a PMHX of hypertension, CKD, substance abuse, and CVA  who presents to the emergency department (room 6) by POV C/O posterior headache, dizziness, and nausea that started last night around 10 PM.  Patient tells me he had recently smoked crack cocaine when the symptoms started.  He was unable to walk home due to the gait instability and had to crawl into his house.  He tells me his symptoms are exactly the same as they were in July of last year when he was diagnosed with a stroke.  Patient denies any slurred speech, arm/leg weakness, or decree sensation.    Nursing Notes were all reviewed and agreed with or any disagreements were addressed in the HPI.    Past History     PAST MEDICAL HISTORY:  Past Medical History:   Diagnosis Date    Frequent headaches     Hypertension     Muscle pain     Neuropathy     Visual disturbance        PAST SURGICAL HISTORY:  Past Surgical History:   Procedure Laterality Date    HERNIA REPAIR      times 2       FAMILY HISTORY:  Family History   Problem Relation Age of Onset    Neuropathy Father     Stroke Mother     Heart Disease Father        SOCIAL HISTORY:  Social History     Tobacco Use    Smoking status: Some Days     Current packs/day: 0.25     Average packs/day: 0.3 packs/day for 30.0 years (7.5 ttl pk-yrs)     Types: Cigarettes    Smokeless tobacco: Never    Tobacco comments:     Smokes \"real tobacco\" - smokes self rolled 2-3 cigarettes per day   Vaping Use    Vaping Use: Never used   Substance Use Topics    Alcohol use: No    Drug  him.  He recommends ambulating the patient to assess the gait. [MS]      ED Course User Index  [MS] Martinez Sanchez MD     SEPSIS:  Is this patient to be included in the SEP-1 core measure? No Exclusion criteria - the patient is NOT to be included for SEP-1 Core Measure due to: Infection is not suspected    Clinical Management Tools:  Not Applicable    Patient was given the following medications:    Medications   albuterol (PROVENTIL) (2.5 MG/3ML) 0.083% nebulizer solution 2.5 mg (2.5 mg Nebulization Given 4/18/24 1540)   sodium chloride flush 0.9 % injection 10 mL (10 mLs IntraVENous Given 4/19/24 1200)     CONSULTS:    IP CONSULT TO TELE-NEUROLOGY    Social Determinants affecting Diagnosis/Treatment: Patient has a substance abuse problem. Given Substance Abuse resources.    Smoking Cessation:   Tobacco Cessation Counseling   I spent 5 minutes discussing the medical risks of prolonged smoking habits and advised the patient of the benefits of the cessation of smoking, providing specific suggestions on how to quit.  Martinez Sanchez MD    DISCUSSION:  This appears to be a severe conditon.  This appears to be an acute condition.    69 y.o. male presents with posterior headache, dizziness, and gait abnormality that started at 11 PM last night after smoking crack cocaine.  Patient has a history of similar presentation 9 months ago and believes he is having another stroke.  He has no motor deficits or paresthesias and has a normal finger-nose test.  Given his reported symptoms and prior history, will make him a level 2 code stroke, discuss with teleneurology, and closely monitor. The decision to perform testing and results were discussed with the patient. I discussed each of these tests and considerations with the patient. Patient agrees with the plan of admission.    ADDITIONAL CONSIDERATIONS:  None  Procedures/Critical Care     EKG 12 Lead    Date/Time: 4/18/2024 2:00 PM    Performed by: Martinez Sanchez,

## 2024-04-18 NOTE — CARE COORDINATION
CM began initial assessment w/ pt in ED. Pt took a call from his DSS  (attempting to resolve an issue w/ his food stamps), so CM was unable to complete the assessment. Will try again after pt is moved upstairs.    Nupur Mcginnis, NINA, CM

## 2024-04-18 NOTE — H&P
History and Physical    Date of Service:  4/18/2024  Primary Care Provider: Femi Barbour MD  Source of information: patient    Chief Complaint: Dizziness      History of Presenting Illness:   Bruce Crawford is a 69 y.o. male with past medical history of BPH,  CKD, substance abuse presents to the ED due to dizziness.  On 04/17/2024 around 10:30 PM patient was smoking crack and afterwards noticed dizziness.  He describes an episode of having to crawl from his neighbor's house to his house and having 2 episodes of nbnb emesis.  Patient recalls a similar episode of dizziness in 07/2023 and consider this a stroke.  Of note, patient was admitted to [facility] on 07/2023 and refused CTA and MRI brain. On discharge since stroke was not ruled out, the differential was vestibular migraine vs CVA without residual neurodeficits. Patient believes he had a stroke on 07/2023.     Patient arrived to the ED hypertensive blood pressure 144/83.  He complained of dizziness, gait issues, a pressure-like sensation on the back of his head.  He denied any neuro deficits. Code stroke initiated, NIHSS 0, CT head negative for acute ischemia or hemorrhage, CTA refused. Teleneurologist determined acute ischemic stroke of the posterior circulation versus possible vasoconstrictive syndrome given cocaine use. Patient not a candidate for TPA or MT.  Patient was admitted for stroke workup.    Currently patient continues to have intermittent episodes of dizziness and occipital headache. He denies blurry vision, sore throat, trouble swallowing, trouble with speech, chest pain, SOB, cough, fever, chills, N/V/D, abd pain, constipation, recent travels, sick contacts, falls, injuries, rashes, contact with COVID-19 diagnosed patients, hematemesis, melena, hemoptysis, hematuria, or rashes.         REVIEW OF SYSTEMS:  Per HPI    Past Medical History:   Diagnosis Date    Frequent headaches     Hypertension     Muscle pain     Neuropathy     Visual  Declined (4/18/2024)    PRAPARE - Transportation     Lack of Transportation (Medical): Patient declined     Lack of Transportation (Non-Medical): Patient declined   Physical Activity: Not on file   Stress: Not on file   Social Connections: Not on file   Intimate Partner Violence: Not on file   Depression: Not at risk (7/21/2023)    PHQ-2     PHQ-2 Score: 0   Housing Stability: Unknown (4/18/2024)    Housing Stability Vital Sign     Unable to Pay for Housing in the Last Year: Patient declined     Number of Places Lived in the Last Year: 1     Unstable Housing in the Last Year: No   Interpersonal Safety: Not At Risk (4/18/2024)    Interpersonal Safety Domain Source: IP Abuse Screening     Physical abuse: Denies     Verbal abuse: Denies     Emotional abuse: Denies     Financial abuse: Denies     Sexual abuse: Denies   Utilities: Not At Risk (4/18/2024)    [facility]     Threatened with loss of utilities: No        Medications were reconciled to the best of my ability given all available resources at the time of admission. Route is PO if not otherwise noted.     Family and social history were personally reviewed, all pertinent and relevant details are outlined as above.    Objective:   BP (!) 147/85   Pulse 84   Temp 98.4 °F (36.9 °C) (Oral)   Resp 16   Ht 1.816 m (5' 11.5\")   Wt 111.1 kg (245 lb)   SpO2 96%   BMI 33.69 kg/m²         PHYSICAL EXAM:   General: No acute distress. Well developed, well nourished.  HEENT: Eyes: perrl, no discharge or icterus.  Cardiovascular: S1, S2, rrr, no mrg  Respiratory: clear to auscultation b/l  Abdomen: active bowel sounds on 4q, abdomen soft, nontender, no guarding or rigidity, no peritoneal signs  Extremities: No edema, cyanosis, (+2) bi dorsalis pedal pulse  Neurologic:  No gross focal abnormalities, no slurred speech, PERRLA, CN II-XII are intact, there is no sensory deficits noted to gross sensation throughout, no fascial drooping   Upper Extremities:  Shoulder:

## 2024-04-18 NOTE — ED NOTES
TRANSFER - OUT REPORT:    Verbal report given to AAYUSH Brady on Bruce Crawford  being transferred to [facility] Med-Surg, room 210 for routine progression of patient care       Report consisted of patient's Situation, Background, Assessment and   Recommendations(SBAR).     Information from the following report(s) ED Encounter Summary, ED SBAR, Intake/Output, MAR, and Recent Results was reviewed with the receiving nurse.    [place] Fall Assessment:    Presents to emergency department  because of falls (Syncope, seizure, or loss of consciousness): No  Age > 70: Yes  Altered Mental Status, Intoxication with alcohol or substance confusion (Disorientation, impaired judgment, poor safety awaremess, or inability to follow instructions): No  Impaired Mobility: Ambulates or transfers with assistive devices or assistance; Unable to ambulate or transer.: No  Nursing Judgement: Yes          Lines:   Peripheral IV 04/18/24 Right;Anterior Hand (Active)   Site Assessment Clean, dry & intact 04/18/24 1432   Line Status Blood return noted;Specimen collected;Flushed;Normal saline locked 04/18/24 1432   Line Care Connections checked and tightened 04/18/24 1432   Phlebitis Assessment No symptoms 04/18/24 1432   Infiltration Assessment 0 04/18/24 1432   Dressing Status Clean, dry & intact 04/18/24 1432   Dressing Type Transparent 04/18/24 1432   Dressing Intervention New 04/18/24 1432        Opportunity for questions and clarification was provided.      Patient transported with:  Monitor

## 2024-04-18 NOTE — PROGRESS NOTES
[facility] Admission Pharmacy Medication Reconciliation    Information obtained from:Patient, RxQuery  RxQuery data available1:Yes    Comments/recommendations:  Interviewed patient by phone to verify PTA medications, allergies and preferred pharmacy. He was a good historian, able to provide names and strengths of current medications. Dispense history in RxQuery and VAPMP indicates patient is not always compliant with daily medications.  The [place] Prescription Monitoring Program () was accessed to determine fill history of any controlled medications.  History includes 3 dispenses for gabapentin 300 mg : 12/18/23 #270 capsule for 90 day supply, 8/24/23 #270 for 90 day supply, 7/20/23 #90 for 30 day supply.      Medication changes (since last review):  Added  Prednisone 20 mg   Tamsulosin 0.4 mg  Removed  Albuterol neb, 3 ml every 6 hr PRN for wheezing  Atorvastatin 40 mg daily  Fioricet -40 mg every 4 hr as needed for headache  Clopidogrel 75 mg daily  Trelegy Ellipta 100-62.5-25 mcg/act, 1 puff daily  Meloxicam 7.5 mg daily  Adjusted  none       1RxQuery pharmacy benefit data reflects medications filled and processed through the patient's insurance, however                this data does NOT capture whether the medication was picked up or is currently being taken by the patient.         Patient allergies:   Allergies as of 04/18/2024    (No Known Allergies)         Prior to Admission Medications   Prescriptions Last Dose Informant   amLODIPine (NORVASC) 5 MG tablet 4/17/2024 Self   Sig: Take 1 tablet by mouth daily   aspirin 81 MG EC tablet 4/18/2024 Self   Sig: Take 1 tablet by mouth daily   finasteride (PROSCAR) 5 MG tablet 4/17/2024 Self   Sig: Take 1 tablet by mouth daily   gabapentin (NEURONTIN) 100 MG capsule 4/17/2024 Self   Sig: Take 1 capsule by mouth 3 times daily for 30 days. Max Daily Amount: 300 mg   predniSONE (DELTASONE) 20 MG tablet Past Week Self   Sig: Take 1 tablet by mouth 2 times daily

## 2024-04-18 NOTE — ED NOTES
Pt presents to ED complaining of dizziness and posterior basilar headache since 2300 last night. Pt states that he was sitting and watching TV when the dizziness occurred. Pt also endorses vomiting last night and this morning, and that he smoked crack last night. Pt has hx of strokes and denies extremity weakness. Pt is alert and oriented x 4, RR even and unlabored, skin is warm and dry. Pt appears in NAD at this time. Assessment completed and pt updated on plan of care.  Call bell in reach.   Emergency Department Nursing Plan of Care  The Nursing Plan of Care is developed from the Nursing assessment and Emergency Department Attending provider initial evaluation.  The plan of care may be reviewed in the “ED Provider note”.  The Plan of Care was developed with the following considerations:  Patient / Family readiness to learn indicated by:Refer to Medical chart in [facility]  Persons(s) to be included in education: Refer to Medical chart in [facility]  Barriers to Learning/Limitations:Normal

## 2024-04-18 NOTE — PROGRESS NOTES
Patient declined IV access above wrist that is needed for CTA of head and neck when RN had the patient in CT at 1415.

## 2024-04-19 ENCOUNTER — APPOINTMENT (OUTPATIENT)
Facility: HOSPITAL | Age: 69
DRG: 149 | End: 2024-04-19
Attending: INTERNAL MEDICINE
Payer: MEDICARE

## 2024-04-19 LAB
CHOLEST SERPL-MCNC: 163 MG/DL
ECHO AO ROOT DIAM: 2.6 CM
ECHO AO ROOT INDEX: 1.13 CM/M2
ECHO AV AREA PEAK VELOCITY: 1.8 CM2
ECHO AV AREA PLAN/BSA: 1.43 CM2/M2
ECHO AV AREA PLAN: 3.3 CM2
ECHO AV AREA/BSA PEAK VELOCITY: 0.8 CM2/M2
ECHO AV PEAK GRADIENT: 12 MMHG
ECHO AV PEAK VELOCITY: 1.7 M/S
ECHO AV VELOCITY RATIO: 0.76
ECHO BSA: 2.37 M2
ECHO EST RA PRESSURE: 5 MMHG
ECHO LA DIAMETER INDEX: 1.3 CM/M2
ECHO LA DIAMETER: 3 CM
ECHO LA TO AORTIC ROOT RATIO: 1.15
ECHO LA VOL A-L A4C: 69 ML (ref 18–58)
ECHO LA VOL MOD A4C: 63 ML (ref 18–58)
ECHO LA VOLUME INDEX A-L A4C: 30 ML/M2 (ref 16–34)
ECHO LA VOLUME INDEX MOD A4C: 27 ML/M2 (ref 16–34)
ECHO LV EDV A4C: 150 ML
ECHO LV EDV INDEX A4C: 65 ML/M2
ECHO LV EJECTION FRACTION A4C: 44 %
ECHO LV ESV A4C: 84 ML
ECHO LV ESV INDEX A4C: 36 ML/M2
ECHO LV FRACTIONAL SHORTENING: 30 % (ref 28–44)
ECHO LV INTERNAL DIMENSION DIASTOLE INDEX: 1.86 CM/M2
ECHO LV INTERNAL DIMENSION DIASTOLIC: 4.3 CM (ref 4.2–5.9)
ECHO LV INTERNAL DIMENSION SYSTOLIC INDEX: 1.3 CM/M2
ECHO LV INTERNAL DIMENSION SYSTOLIC: 3 CM
ECHO LV IVSD: 1.5 CM (ref 0.6–1)
ECHO LV MASS 2D: 258.1 G (ref 88–224)
ECHO LV MASS INDEX 2D: 111.7 G/M2 (ref 49–115)
ECHO LV POSTERIOR WALL DIASTOLIC: 1.5 CM (ref 0.6–1)
ECHO LV RELATIVE WALL THICKNESS RATIO: 0.7
ECHO LVOT AREA: 2.5 CM2
ECHO LVOT DIAM: 1.8 CM
ECHO LVOT PEAK GRADIENT: 6 MMHG
ECHO LVOT PEAK VELOCITY: 1.3 M/S
ECHO MV A VELOCITY: 0.66 M/S
ECHO MV AREA PHT: 2.6 CM2
ECHO MV E DECELERATION TIME (DT): 223.5 MS
ECHO MV E VELOCITY: 0.3 M/S
ECHO MV E/A RATIO: 0.45
ECHO MV MAX VELOCITY: 0.9 M/S
ECHO MV MEAN GRADIENT: 1 MMHG
ECHO MV MEAN VELOCITY: 0.3 M/S
ECHO MV PEAK GRADIENT: 3 MMHG
ECHO MV PRESSURE HALF TIME (PHT): 85.7 MS
ECHO MV VTI: 21.1 CM
ECHO PULMONARY ARTERY END DIASTOLIC PRESSURE: 8 MMHG
ECHO RIGHT VENTRICULAR SYSTOLIC PRESSURE (RVSP): 31 MMHG
ECHO TV REGURGITANT MAX VELOCITY: 2.57 M/S
ECHO TV REGURGITANT PEAK GRADIENT: 26 MMHG
ERYTHROCYTE [DISTWIDTH] IN BLOOD BY AUTOMATED COUNT: 13.8 % (ref 11.5–14.5)
EST. AVERAGE GLUCOSE BLD GHB EST-MCNC: 117 MG/DL
HBA1C MFR BLD: 5.7 % (ref 4–5.6)
HCT VFR BLD AUTO: 43 % (ref 36.6–50.3)
HDLC SERPL-MCNC: 55 MG/DL
HDLC SERPL: 3 (ref 0–5)
HGB BLD-MCNC: 13.9 G/DL (ref 12.1–17)
LDLC SERPL CALC-MCNC: 89.4 MG/DL (ref 0–100)
MCH RBC QN AUTO: 29.1 PG (ref 26–34)
MCHC RBC AUTO-ENTMCNC: 32.3 G/DL (ref 30–36.5)
MCV RBC AUTO: 90.1 FL (ref 80–99)
NRBC # BLD: 0 K/UL (ref 0–0.01)
NRBC BLD-RTO: 0 PER 100 WBC
PLATELET # BLD AUTO: 257 K/UL (ref 150–400)
PMV BLD AUTO: 9.7 FL (ref 8.9–12.9)
RBC # BLD AUTO: 4.77 M/UL (ref 4.1–5.7)
TRIGL SERPL-MCNC: 93 MG/DL
VLDLC SERPL CALC-MCNC: 18.6 MG/DL
WBC # BLD AUTO: 9.6 K/UL (ref 4.1–11.1)

## 2024-04-19 PROCEDURE — 93306 TTE W/DOPPLER COMPLETE: CPT | Performed by: SPECIALIST

## 2024-04-19 PROCEDURE — 6370000000 HC RX 637 (ALT 250 FOR IP): Performed by: HOSPITALIST

## 2024-04-19 PROCEDURE — 80061 LIPID PANEL: CPT

## 2024-04-19 PROCEDURE — 6360000002 HC RX W HCPCS: Performed by: INTERNAL MEDICINE

## 2024-04-19 PROCEDURE — 93010 ELECTROCARDIOGRAM REPORT: CPT | Performed by: SPECIALIST

## 2024-04-19 PROCEDURE — 6370000000 HC RX 637 (ALT 250 FOR IP): Performed by: INTERNAL MEDICINE

## 2024-04-19 PROCEDURE — 94760 N-INVAS EAR/PLS OXIMETRY 1: CPT

## 2024-04-19 PROCEDURE — G0425 INPT/ED TELECONSULT30: HCPCS | Performed by: PSYCHIATRY & NEUROLOGY

## 2024-04-19 PROCEDURE — 83036 HEMOGLOBIN GLYCOSYLATED A1C: CPT

## 2024-04-19 PROCEDURE — 93306 TTE W/DOPPLER COMPLETE: CPT

## 2024-04-19 PROCEDURE — 1200000000 HC SEMI PRIVATE

## 2024-04-19 PROCEDURE — 2580000003 HC RX 258: Performed by: INTERNAL MEDICINE

## 2024-04-19 PROCEDURE — 36415 COLL VENOUS BLD VENIPUNCTURE: CPT

## 2024-04-19 PROCEDURE — 94640 AIRWAY INHALATION TREATMENT: CPT

## 2024-04-19 PROCEDURE — 97161 PT EVAL LOW COMPLEX 20 MIN: CPT | Performed by: PHYSICAL THERAPIST

## 2024-04-19 PROCEDURE — 85027 COMPLETE CBC AUTOMATED: CPT

## 2024-04-19 RX ORDER — GABAPENTIN 100 MG/1
200 CAPSULE ORAL 3 TIMES DAILY
Status: DISCONTINUED | OUTPATIENT
Start: 2024-04-19 | End: 2024-04-22 | Stop reason: HOSPADM

## 2024-04-19 RX ORDER — SODIUM CHLORIDE 0.9 % (FLUSH) 0.9 %
10 SYRINGE (ML) INJECTION ONCE
Status: COMPLETED | OUTPATIENT
Start: 2024-04-19 | End: 2024-04-19

## 2024-04-19 RX ORDER — CYCLOBENZAPRINE HCL 10 MG
10 TABLET ORAL 3 TIMES DAILY PRN
Status: DISCONTINUED | OUTPATIENT
Start: 2024-04-19 | End: 2024-04-22 | Stop reason: HOSPADM

## 2024-04-19 RX ADMIN — GABAPENTIN 100 MG: 100 CAPSULE ORAL at 09:00

## 2024-04-19 RX ADMIN — SODIUM CHLORIDE, PRESERVATIVE FREE 10 ML: 5 INJECTION INTRAVENOUS at 09:06

## 2024-04-19 RX ADMIN — TAMSULOSIN HYDROCHLORIDE 0.4 MG: 0.4 CAPSULE ORAL at 08:59

## 2024-04-19 RX ADMIN — IPRATROPIUM BROMIDE AND ALBUTEROL SULFATE 1 DOSE: 2.5; .5 SOLUTION RESPIRATORY (INHALATION) at 10:06

## 2024-04-19 RX ADMIN — GABAPENTIN 200 MG: 100 CAPSULE ORAL at 20:58

## 2024-04-19 RX ADMIN — ATORVASTATIN CALCIUM 80 MG: 40 TABLET, FILM COATED ORAL at 20:58

## 2024-04-19 RX ADMIN — ENOXAPARIN SODIUM 30 MG: 100 INJECTION SUBCUTANEOUS at 20:59

## 2024-04-19 RX ADMIN — SODIUM CHLORIDE, PRESERVATIVE FREE 10 ML: 5 INJECTION INTRAVENOUS at 12:00

## 2024-04-19 RX ADMIN — ARFORMOTEROL TARTRATE 15 MCG: 15 SOLUTION RESPIRATORY (INHALATION) at 07:32

## 2024-04-19 RX ADMIN — ENOXAPARIN SODIUM 30 MG: 100 INJECTION SUBCUTANEOUS at 09:02

## 2024-04-19 RX ADMIN — ARFORMOTEROL TARTRATE 15 MCG: 15 SOLUTION RESPIRATORY (INHALATION) at 19:48

## 2024-04-19 RX ADMIN — FINASTERIDE 5 MG: 5 TABLET, FILM COATED ORAL at 08:59

## 2024-04-19 RX ADMIN — ASPIRIN 81 MG: 81 TABLET, CHEWABLE ORAL at 08:56

## 2024-04-19 RX ADMIN — SODIUM CHLORIDE, PRESERVATIVE FREE 10 ML: 5 INJECTION INTRAVENOUS at 20:58

## 2024-04-19 RX ADMIN — GABAPENTIN 100 MG: 100 CAPSULE ORAL at 13:33

## 2024-04-19 ASSESSMENT — PAIN SCALES - GENERAL
PAINLEVEL_OUTOF10: 0

## 2024-04-19 NOTE — PROGRESS NOTES
Occupational Therapy Note:  Orders received and appreciated.  Chart reviewed and spoke with PT.  Per PT pt is independent with ADLs and mod I with functional mobility following admission for dizziness and possible CVA.  Pt with symmetrical BUE strength, coordination and sensation.  Head CT negative for acute process.  No functional impairments noted at this time and no skilled OT services required.  Will complete OT order.  Thank you for this consult.  Kortney Woods, OTR/L, CBIS

## 2024-04-19 NOTE — PROGRESS NOTES
2150: Pt now refusing CTA- states contrast burns and he is not interested in having the procedure done anymore. NIHSS still 0. Pt c/o dizziness with ambulation.

## 2024-04-19 NOTE — CARE COORDINATION
BEST   RUR 9 %       Medicaid - DMAS- UAI ( Uniform Assessment) started did not meet criteria at this time.   Recommend update UAI be done in the Home Setting-            Kylee KURTZ RN    [phone]

## 2024-04-19 NOTE — PROGRESS NOTES
Speech pathology note  Reviewed chart and note patient admitted with dizziness with concern for CVA. Note CT showed No acute intracranial abnormality and patient refused MRI. Note patient passed the nursing dysphagia screen and a regular diet was ordered. NIHSS=0. Discussed case with PT who reported no SLP-related concerns. Formal SLP evaluation not clinically indicated at this time. Will sign off. Please re-consult if further needs arise. Thank you.    Licha Hopkins, SLP

## 2024-04-19 NOTE — PROGRESS NOTES
Problem: Physical Therapy - Adult  Goal: By Discharge: Performs mobility at highest level of function for planned discharge setting.  See evaluation for individualized goals.  Description: FUNCTIONAL STATUS PRIOR TO ADMISSION: Patient was independent and active without use of DME.    HOME SUPPORT PRIOR TO ADMISSION: The patient lived alone with no local support.    Physical Therapy Goals  Initiated 4/19/2024  1.  Patient will perform sit to stand with independence within 7 day(s).  2.  Patient will transfer from bed to chair and chair to bed with independence using the least restrictive device within 7 day(s).  3.  Patient will ambulate with independence for 75 feet with the least restrictive device within 7 day(s).   4.  Patient will ascend/descend 4 stairs with single handrail(s) with independence within 7 day(s).  5.  Patient will improve Patrick Balance score by 7 points within 7 days.    Outcome: Progressing   PHYSICAL THERAPY EVALUATION    Patient: Bruce Crawford (69 y.o. male)  Date: 4/19/2024  Primary Diagnosis: Cocaine abuse (HCC) [F14.10]  Suspected cerebrovascular accident [R09.89]  Suspected cerebrovascular accident (CVA) [R09.89]  Cerebrovascular accident (CVA) involving posterior circulation (HCC) [I63.50]       Precautions: fall risk      ASSESSMENT :   DEFICITS/IMPAIRMENTS:   The patient is limited by decreased attention/concentration, dizziness      Based on the impairments listed above the patient is slightly below their baseline level of function. There is concern for possible CVA, however patient is declining MRI and CTA testing at this time. Patient's strength and AROM are WNL throughout BUE and BLE. He is able to ambulate with normal gait pattern, but is limited due to dizziness and fear of falling. Patient educated on the importance of mobilizing slowing, fixating gaze, and sitting when dizziness is severe. Patient was to recall education, but not reliably demonstrate safety behaviors. PT will                                                                                                                                Patrick Balance Test:    Patrick Balance Scale  1. Sitting to Standing: Able to stand independently using hands  2. Standing Unsupported: Able to stand 2 minutes with supervision  3. Sitting with Back Unsupported but Feet Supported on Floor or on a Stool: Able to sit 2 minutes under supervision  4. Standing to Sitting: Controls descent by using hands  5.  Transfers: Able to transfer safely definite need of hands  6. Standing Unsupported with Eyes Closed: Able to stand 10 seconds with supervision  7. Standing Unsupported with Feet Together: Able to place feet together independently and stand 1 minute with supervision  8. Reach Forward with Outstretched Arm While Standing: Can reach forward 12 cm (5 inches)  9.  Object from Floor from a Standing Position: Able to  slipper safely and easily  10. Turning to Look Behind Over Left and Right Shoulders While Standing: Looks behind from both sides and weight shifts well  11. Turn 360 Degrees: Needs close supervision or verbal cueing  12. Place Alternate Foot on Step or Stool While Standing Unsupported: Able to stand independently and complete 8 steps in greater than 20 seconds  13. Standing Unsupported One Foot in Front: Able to take small step independently and hold 30 seconds  14. Standing on One Leg: Able to lift leg independently and hold greater than or equal to 3 seconds  Patrick Balance Score: 40         56=Maximum possible score;   0-20=High fall risk  21-40=Moderate fall risk   41-56=Low fall risk                                                                                                                                                                                                                               Pain Ratin/10 \"I don't have pain, just a headache\"   Pain Intervention(s):   nursing notified and addressing and pain

## 2024-04-19 NOTE — CARE COORDINATION
CM informed by her  that attending provider had medically cleared patient for discharge; pt asserting that he does not feel well enough to be discharged at this time.     CM met w/ pt at bedside. Important Message from Medicare Letter provided to him by [facility]. Oral explanation was provided and all questions answered. Signed document placed on the bedside chart to be scanned under the media tab. Copy provided to patient (Bruce Crawford) as well.     Pt stated that he felt he was being given an ultimatum about refusing an MRI and CT and felt that he could only stay in the hospital if he agreed to these tests. CM informed pt that he had the right to contact [facility] with concerns about his discharge. Pt asked CM to point out the correct number to call ([phone]).     CM also let pt know that she had submitted his referral for [facility] and that the health  (Annamaria RUDOLPH) would be contacting him to set up a home visit on Monday. Pt asked CM again how to get more information on getting his daughter set up to provide him w/ home health and be paid through his Medicaid or Medicare coverage. CM called [facility] together to confirm the name of his Medicaid worker; they put in a ticket and were told pt would receive a call in 3-5 days. CM also gave pt the number to call the Health Dept; encouraged him to make these calls w/ his daughter present.     Pt began placing call to [facility] as CM was leaving his room.     Nupur Mcginnis LMSW, [facility]

## 2024-04-19 NOTE — PROGRESS NOTES
Patient’s case reviewed during interdisciplinary team meeting in Med Surg/Tele Unit 2.  Rev. Valeria Carr MDiv, [facility]

## 2024-04-19 NOTE — PROGRESS NOTES
Comprehensive Nutrition Assessment    Type and Reason for Visit: Initial, Consult    Nutrition Recommendations/Plan:   Consider CC diet with 5 CHO choices per tray.  Pt's A1c is 5.8 and he is obese  Enc fluid intake       Malnutrition Assessment:  Malnutrition Status:  No malnutrition (04/19/24 1334)           Nutrition Assessment:    Pt admitted with sizziness and HA, pt hypertensive in ED, states he may have had a stroke in 7/23 but refused MRI which he is still refusing.  Hx notable for HTN, pre-diabetes, CKD, COPD, substance abuse    Nutrition Related Findings:    Pt tolerating diet Wound Type: None       Lab Results   Component Value Date/Time     04/18/2024 02:24 PM    K 3.7 04/18/2024 02:24 PM     04/18/2024 02:24 PM    CO2 30 04/18/2024 02:24 PM    BUN 17 04/18/2024 02:24 PM    CREATININE 1.33 04/18/2024 02:24 PM    GLUCOSE 142 04/18/2024 02:24 PM    CALCIUM 9.6 04/18/2024 02:24 PM    PHOS 4.3 07/18/2023 06:58 AM    MG 1.9 07/18/2023 06:58 AM          Estimated Daily Nutrient Needs:  Energy Requirements Based On: Kcal/kg  Weight Used for Energy Requirements: Adjusted  Energy (kcal/day): 2385  Weight Used for Protein Requirements: Adjusted  Protein (g/day): 105  Method Used for Fluid Requirements: Other (Comment)  Fluid (ml/day): 3000    Nutrition Related Findings:   Edema: None                    Last BM: 04/18/24    Wounds:   Wound Type: None      Current Nutrition Intake & Therapies:    Average Meal Intake: %  Average Supplements Intake: None Ordered  ADULT DIET; Regular  Meal Intake:   Patient Vitals for the past 168 hrs:   PO Meals Eaten (%)   04/19/24 0912 76 - 100%     Supplement Intake:  No data found.  Nutrition Support: none      Anthropometric Measures:  Height: 181.6 cm (5' 11.5\")  Ideal Body Weight (IBW): 175 lbs (80 kg)       Current Body Weight: 111.1 kg (245 lb), 140 % IBW.    Current BMI (kg/m2): 33.7        Weight Adjustment For:  (ABW 95.4 kg)                 BMI

## 2024-04-19 NOTE — FLOWSHEET NOTE
1854: Patient complaining of RUE pain & weakness. Dr Robertson notified.  1900: Dr Robertson @ bedside. Per Dr Robertson, plan to increase the dose of gabapentin and order something PRN for muscle spasms. Waiting for orders.  Night shift RN notiifed.

## 2024-04-19 NOTE — CARE COORDINATION
BEST  RUR: 9%     04/18/24 1604   Service Assessment   Patient Orientation Alert and Oriented;Person;Place;Situation;Self   Cognition Alert   History Provided By Patient   Primary Caregiver Self   Accompanied By/Relationship N/A   Support Systems Family Members  (His daughters)   Patient's Healthcare Decision Maker is: Patient Declined (Legal Next of Kin Remains as Decision Maker)  (His daughter Fallon RUDOLPH)   PCP Verified by CM Yes   Last Visit to PCP Within last 3 months   Prior Functional Level Independent in ADLs/IADLs  (Experiences difficulty standing for long periods of time)   Current Functional Level Independent in ADLs/IADLs  (Experiences difficulty standing for long periods of time)   Can patient return to prior living arrangement Yes   Ability to make needs known: Good   Family able to assist with home care needs: Yes  (Yes, if needed)   Would you like for me to discuss the discharge plan with any other family members/significant others, and if so, who? Yes  (His daughter, if necessary)   Financial Resources Food [place];Medicare;Other (Comment)  (Pt is registered with PIPP program through [facility])   Community Resources Other (Comment)  (Pt is well connected to area food pantries)   Social/Functional History   Lives With Alone   Type of Home House   Active  No   Mode of Transportation Other  (Bicycle)   Occupation Retired   Discharge Planning   Type of Residence House   Living Arrangements Alone   Current Services Prior To Admission Durable Medical Equipment   Current DME Prior to Arrival Walker;Other (Comment)  ([employer])   Potential Assistance Needed Other (Comment)  (Pt is open to a [facility] referral; also wants to know how his daughter can become his caregiver through Medicaid)   Potential Assistance Purchasing Medications Yes   Type of Home Care Services None  (Pt states that he does not have home health, but would like personal care if he's eligible)   Patient expects to be discharged to:  House   Follow Up Appointment: Best Day/Time    (Pt did not specify)   History of falls? 1   Services At/After Discharge   Transition of Care Consult (CM Consult) Discharge Planning;Transportation Assistance   Services At/After Discharge Transport   [place] Resource Information Provided? No   Confirm Follow Up Transport Other (see comment)  (Pt will need a Medicaid cab)   Condition of Participation: Discharge Planning   The Plan for Transition of Care is related to the following treatment goals: PCP follow up, Neuro referral, Ophthalmology referral, [facility] referral, and community resources   The Patient and/or Patient Representative was provided with a Choice of Provider? Patient  (Pt is aleady esablished w/ PCP)   The Patient and/Or Patient Representative agree with the Discharge Plan? Yes   Freedom of Choice list was provided with basic dialogue that supports the patient's individualized plan of care/goals, treatment preferences, and shares the quality data associated with the providers?  Yes     CM met w/ pt in ED last night to complete initial assessment. Pt received a call from his [facility]  and CM was unable to finish prior to pt being admitted to med floor. CM met w/ pt again today to complete assessment.     Pt talked at length w/ case management , Kylee Dejesus, via phone call on 4/17/24, about several social concerns that he is experiencing related to substance abuse/recovery, utilities, and support needs. CM team has connected pt w/ [facility], and [facility]. Pt stated that he he is well connected to local food pantries, he was recently enrolled in the PIPP program (financial assistance w/ utilities), and is getting his SNAP sorted out w/ his [facility] .     Pt lives alone in his home, is independent in all of his ADL's, but has difficulty standing for long periods of time. He demonstrates strong self-advocacy skills and resourcefulness in several

## 2024-04-19 NOTE — PROGRESS NOTES
Placed a call of [facility] requesting a Teleneuro consult per Dr. Robertson's request.  Patient information provided to .

## 2024-04-19 NOTE — PROGRESS NOTES
Physician Progress Note      PATIENT:               JENNIFER SHIPMAN  CSN #:                  [id number]  :                       1955  ADMIT DATE:       2024 1:47 PM  DISCH DATE:  RESPONDING  PROVIDER #:        Adan Robertson MD          QUERY TEXT:      Dear attending,    Pt noted to have BPH and was treated with Flomax. If possible, please document   in progress notes and discharge summary if you are evaluating and/or treating   any of the following:    The medical record reflects the following:  Risk Factors: Hx. BPH    Clinical Indicators:  Per H&P- BPH -on flomax and finasterid    Treatment: Monitor output, Flomax      Thank you    Svetlana Hooks RN, BSN, CRCR, CCDS  Clinical Documentation Improvement  [email or web address]  Availability via Perfect Serve  Options provided:  -- BPH with partial/complete urinary obstruction  -- BPH with urinary retention without obstruction  -- Other - I will add my own diagnosis  -- Disagree - Not applicable / Not valid  -- Disagree - Clinically unable to determine / Unknown  -- Refer to Clinical Documentation Reviewer    PROVIDER RESPONSE TEXT:    This patient has BPH with urinary retention without obstruction.    Query created by: Svetlana Hooks on 2024 12:12 PM      Electronically signed by:  Adan Robertson MD 2024 2:14 PM           Continue dosing schedule 10mg on Sun, Wed, Sat and 15mg the rest of the week. Follow up in 4 weeks for recheck or when you return home, sooner with any concerns.   No med changes. No bleeding problems. Patient denies any diet changes. Reviewed [facility] Flowsheet. Teaching given regarding vit K foods and diet consistency, fall protocol. Onsite provider is Dr Chamorro. Patient ambulated to office without assistive device. Patient verbalizes understanding and agreement.  Anticoagulation Summary  As of 2/6/2018    INR goal:   2.0-3.0   TTR:   54.0 % (2.2 y)   Today's INR:   2.6   Maintenance plan:   10 mg (10 mg x 1) on Dey, W, Sa; 15 mg (10 mg x 1 and 5 mg x 1) all other days   Weekly total:   90 mg   No change documented:   Glen Heredia RN   Plan last modified:   Glen Heredia RN (4/18/2017)   Next INR check:   3/27/2018   Priority:   Follow-Up - 4 Weeks   Target end date:       Indications    Long-term (current) use of anticoagulants [Z79.01]  Acute saddle pulmonary embolism without acute cor pulmonale (CMS/HCC) [I26.92]  Chronic saddle pulmonary embolism without acute cor pulmonale (CMS/HCC) [I26.92]             Anticoagulation Episode Summary     INR check location:   [facility]    Preferred lab:       Send INR reminders to:   NATHANIEL (OPEN ENROLLMENT) [facility]    Comments:   Referral renewal due 11/2017. Takes pills in the morning. 2 to 3 beers/week? Is using pill tray now. Vit K foods. PE on 4/2016, 11/2012, 6/2011.      Anticoagulation Care Providers     Provider Role Specialty Phone number    Romel Chamorro DO Referring Family Practice [phone]

## 2024-04-19 NOTE — CARE COORDINATION
BEST     RUR  9 %     IDR rounds this am with MD and team.  NESTOR later today .     See CM initial assessment.   Asked to assist with follow-up  appointment.   See below on Femi Argueta MD PCP - General Internal Medicine [phone] [address] [address] [facility] [address]      Next Steps: Follow up on 4/25/2024  Instructions: [address] [phone] April 25, 2024 @ 2:30PM  you msy see the NP for hospital follow-up . call the office if you need to change the date or time    [facility]     Recovery  Peer support line 7 days a week  8:00AM-12 Midnight Confidential [phone]     Next Steps: Follow up  Instructions: Please follow-up  as we discussed. Prior to coming in the hospital you talked to CM- someone called you back- to discuss specific programs you may qualify for- Inpatient    [facility]          Next Steps: Follow up  Instructions: as discussed we will send referral to [facility] to see what  resources they can assist you with.  Will have them reach out to you. [phone].    Department of           Next Steps: Follow up  Instructions: Please follow-up with your   as discussed regarding your Medicaid    Neurology [facility] at [facility]  Neurology [phone] [address] [facility] [address] [facility] [address]     Next Steps: Follow up on 5/10/2024  Instructions: MAY10 Follow Up Appointment 2:30 PM ARUN Brown - JENNY KURTZ RN   [phone]

## 2024-04-19 NOTE — CONSULTS
[facility] NEUROLOGY CONSULTATION          Chief Complaint/Admission Diagnosis: Cocaine abuse (HCC) [F14.10]  Suspected cerebrovascular accident [R09.89]  Suspected cerebrovascular accident (CVA) [R09.89]  Cerebrovascular accident (CVA) involving posterior circulation (HCC) [I63.50]     I have been asked to see this 69 y.o. male in neurological consultation by Adan Flores MD  to render advice and opinion regarding dizziness     ?     Impression/Recommendations:   Bruce Crawford is a 69 y.o. male with past medical history of BPH,  CKD, substance abuse presents to the ED due to vertigo in the setting of cocaine abuse. He can't do the MRI because of severe claustrophobia even with sedation or anxiety medication. Exam is normal.   Can't rule out a stroke without an MRI although less likely. Echo shows normal EF. EKG shows NSR. CT head is negative.  Meclizine 25 mg q8hrs PRN for vertigo recommended.  DAPT for 30 days (Aspirin 325 mg qd plus Plavix 75 mg qd) then switch to Plavix 75 mg qd  Atorvastatin 80 mg qd  PT/OT recommended  NPO until see by bedside swallowing or SLP evaluation.  Permissive hypertension. Don't treat HTN until SBP more than 180.  Neuro floor admission with telemetry  Avoid drugs.       Colton Alcala MD  Teleneurologist    HPI:   History was obtained from a review of the electronic record and from the patient and family.??   Bruce Crawford is a 69 y.o. male with past medical history of BPH,  CKD, substance abuse presents to the ED due to dizziness.  On 04/17/2024 around 10:30 PM patient was smoking crack and afterwards noticed dizziness.  He describes an episode of having to crawl from his neighbor's house to his house and having 2 episodes of nbnb emesis.  Patient recalls a similar episode of dizziness in 07/2023 and consider this a stroke.  Of note, patient was admitted to [facility] on 07/2023 and refused CTA and MRI brain. On discharge since stroke was not ruled out, the differential

## 2024-04-20 PROCEDURE — 6360000002 HC RX W HCPCS: Performed by: INTERNAL MEDICINE

## 2024-04-20 PROCEDURE — 2580000003 HC RX 258: Performed by: INTERNAL MEDICINE

## 2024-04-20 PROCEDURE — 6370000000 HC RX 637 (ALT 250 FOR IP): Performed by: HOSPITALIST

## 2024-04-20 PROCEDURE — 1200000000 HC SEMI PRIVATE

## 2024-04-20 PROCEDURE — 6370000000 HC RX 637 (ALT 250 FOR IP): Performed by: INTERNAL MEDICINE

## 2024-04-20 PROCEDURE — 94640 AIRWAY INHALATION TREATMENT: CPT

## 2024-04-20 PROCEDURE — 94760 N-INVAS EAR/PLS OXIMETRY 1: CPT

## 2024-04-20 RX ORDER — ASPIRIN 325 MG
325 TABLET ORAL DAILY
Qty: 30 TABLET | Refills: 0 | Status: SHIPPED | OUTPATIENT
Start: 2024-04-20

## 2024-04-20 RX ORDER — MECLIZINE HYDROCHLORIDE 25 MG/1
25 TABLET ORAL 3 TIMES DAILY PRN
Qty: 90 TABLET | Refills: 0 | Status: SHIPPED | OUTPATIENT
Start: 2024-04-20 | End: 2024-05-20

## 2024-04-20 RX ORDER — CLOPIDOGREL BISULFATE 75 MG/1
75 TABLET ORAL DAILY
Status: DISCONTINUED | OUTPATIENT
Start: 2024-04-20 | End: 2024-04-22 | Stop reason: HOSPADM

## 2024-04-20 RX ORDER — MECLIZINE HCL 12.5 MG/1
25 TABLET ORAL 3 TIMES DAILY PRN
Status: DISCONTINUED | OUTPATIENT
Start: 2024-04-20 | End: 2024-04-22 | Stop reason: HOSPADM

## 2024-04-20 RX ORDER — TAMSULOSIN HYDROCHLORIDE 0.4 MG/1
0.4 CAPSULE ORAL DAILY
Qty: 30 CAPSULE | Refills: 0 | Status: SHIPPED | OUTPATIENT
Start: 2024-04-21

## 2024-04-20 RX ORDER — AMLODIPINE BESYLATE 5 MG/1
5 TABLET ORAL DAILY
Qty: 30 TABLET | Refills: 0 | Status: SHIPPED | OUTPATIENT
Start: 2024-04-20

## 2024-04-20 RX ORDER — CLOPIDOGREL BISULFATE 75 MG/1
75 TABLET ORAL DAILY
Qty: 30 TABLET | Refills: 3 | Status: SHIPPED | OUTPATIENT
Start: 2024-04-20

## 2024-04-20 RX ORDER — ATORVASTATIN CALCIUM 80 MG/1
80 TABLET, FILM COATED ORAL NIGHTLY
Qty: 30 TABLET | Refills: 0 | Status: SHIPPED | OUTPATIENT
Start: 2024-04-20

## 2024-04-20 RX ORDER — ASPIRIN 325 MG
325 TABLET ORAL DAILY
Status: DISCONTINUED | OUTPATIENT
Start: 2024-04-20 | End: 2024-04-22 | Stop reason: HOSPADM

## 2024-04-20 RX ORDER — GABAPENTIN 100 MG/1
100 CAPSULE ORAL 3 TIMES DAILY
Qty: 90 CAPSULE | Refills: 0 | Status: SHIPPED | OUTPATIENT
Start: 2024-04-20 | End: 2024-05-20

## 2024-04-20 RX ORDER — FINASTERIDE 5 MG/1
5 TABLET, FILM COATED ORAL DAILY
Qty: 30 TABLET | Refills: 0 | Status: SHIPPED | OUTPATIENT
Start: 2024-04-21

## 2024-04-20 RX ADMIN — IPRATROPIUM BROMIDE AND ALBUTEROL SULFATE 1 DOSE: 2.5; .5 SOLUTION RESPIRATORY (INHALATION) at 07:49

## 2024-04-20 RX ADMIN — GABAPENTIN 200 MG: 100 CAPSULE ORAL at 21:49

## 2024-04-20 RX ADMIN — ARFORMOTEROL TARTRATE 15 MCG: 15 SOLUTION RESPIRATORY (INHALATION) at 07:49

## 2024-04-20 RX ADMIN — GABAPENTIN 200 MG: 100 CAPSULE ORAL at 08:30

## 2024-04-20 RX ADMIN — SODIUM CHLORIDE, PRESERVATIVE FREE 10 ML: 5 INJECTION INTRAVENOUS at 08:30

## 2024-04-20 RX ADMIN — GABAPENTIN 200 MG: 100 CAPSULE ORAL at 14:21

## 2024-04-20 RX ADMIN — SODIUM CHLORIDE, PRESERVATIVE FREE 10 ML: 5 INJECTION INTRAVENOUS at 21:49

## 2024-04-20 RX ADMIN — TAMSULOSIN HYDROCHLORIDE 0.4 MG: 0.4 CAPSULE ORAL at 08:30

## 2024-04-20 RX ADMIN — ARFORMOTEROL TARTRATE 15 MCG: 15 SOLUTION RESPIRATORY (INHALATION) at 19:40

## 2024-04-20 RX ADMIN — ATORVASTATIN CALCIUM 80 MG: 40 TABLET, FILM COATED ORAL at 21:49

## 2024-04-20 RX ADMIN — ASPIRIN 81 MG: 81 TABLET, CHEWABLE ORAL at 08:30

## 2024-04-20 RX ADMIN — IPRATROPIUM BROMIDE AND ALBUTEROL SULFATE 1 DOSE: 2.5; .5 SOLUTION RESPIRATORY (INHALATION) at 19:40

## 2024-04-20 RX ADMIN — CLOPIDOGREL BISULFATE 75 MG: 75 TABLET ORAL at 10:31

## 2024-04-20 RX ADMIN — MECLIZINE 25 MG: 12.5 TABLET ORAL at 10:29

## 2024-04-20 RX ADMIN — ASPIRIN 325 MG: 325 TABLET ORAL at 10:29

## 2024-04-20 RX ADMIN — ENOXAPARIN SODIUM 30 MG: 100 INJECTION SUBCUTANEOUS at 21:48

## 2024-04-20 RX ADMIN — FINASTERIDE 5 MG: 5 TABLET, FILM COATED ORAL at 08:30

## 2024-04-20 RX ADMIN — ENOXAPARIN SODIUM 30 MG: 100 INJECTION SUBCUTANEOUS at 08:30

## 2024-04-20 ASSESSMENT — PAIN SCALES - GENERAL
PAINLEVEL_OUTOF10: 0

## 2024-04-20 NOTE — PLAN OF CARE
Problem: Pain  Goal: Verbalizes/displays adequate comfort level or baseline comfort level  4/19/2024 2112 by Oswaldo Monroy RN  Outcome: Progressing  4/19/2024 1405 by Coleman Negro RN  Outcome: Progressing     Problem: Safety - Adult  Goal: Free from fall injury  4/19/2024 2112 by Oswaldo Monroy RN  Outcome: Progressing  4/19/2024 1405 by Coleman Negro RN  Outcome: Progressing

## 2024-04-20 NOTE — PROGRESS NOTES
1030) Pt report some dizziness moving to sitting position.  /92, hr 89. Pt counseled to wait a minute prior to standing.  Pt was able to stand independently.  Pt uses walker at home--stated bathroom was within 14 ft of bed. With walker, pt was able to walk 3 steps forwards and 3 steps backwards with upright posture and steady gait.  Pt walked 15 feet to nurses station and returned to bed.  Pt given meclizine for dizziness.

## 2024-04-20 NOTE — PROGRESS NOTES
1925 Bedside and Verbal shift change report given to AAYUSH Abrams (oncoming nurse) by AAYUSH Cee (offgoing nurse). Report included the following information Nurse Handoff Report, Intake/Output, MAR, and Recent Results.

## 2024-04-20 NOTE — DISCHARGE INSTRUCTIONS
Dear Miri Ty,    You were admitted to [facility] due to concern of dizziness and stroke.  It is unclear whether your symptoms are due to a temporay vasospasms caused by cocaine or if you had an actual stroke.  We were unable to complete our evaluation because imaging studies were not allowed to be performed.   While you were admitted, we could not appreciate any neurodeficits.  However you did indicate that you are feeling dizzy and this is something of concern.  Our neurology team recommended that you take meclizine for your dizziness and follow-up with the neurology clinic for further evaluation.  Furthermore since we were unable to rule out stroke the neurology team is recommending that you take aspirin, clopidogrel, atorvastatin.  Please take stop taking aspirin after 30 days but continue taking clopidogrel and atorvastatin indefinitely.  Physical therapy has indicated that you have all the DME equipment at home should you need to use it.  It is recommended you follow-up with your primary care clinician within 7 days of discharge.  We thank you for choosing [facility] for your care.

## 2024-04-20 NOTE — PROGRESS NOTES
Pt received in chair. He is awake alert, verbal, interacting with report, call bell in reach    0253- Md perfect served due to patient stating he felt dizzy upon waking up. Pt vitals taken bp 153/93 hr-57. Orders given to continue to monitor tele patient. Pt is in bed eating a fig bar, call bell in reach    0432- Pt hr-44, non sustained and went back up to Hr-78 . Pt is asleep at this moment. Md made aware via perfect served.

## 2024-04-20 NOTE — DISCHARGE SUMMARY
Discharge Summary   Please note that this dictation was completed with [employer], the computer voice recognition software.  Quite often unanticipated grammatical, syntax, homophones, and other interpretive errors are inadvertently transcribed by the computer software.  Please disregard these errors.  Please excuse any errors that have escaped final proofreading.    PATIENT ID: Bruce Crawford  MRN: [id number]   YOB: 1955    DATE OF ADMISSION: 4/18/2024  1:47 PM    DATE OF DISCHARGE: 4/20/2024  PRIMARY CARE PROVIDER: Femi Barbour MD         ATTENDING PHYSICIAN: Adan Robertson MD  DISCHARGING PROVIDER: Adan Robertson MD       CONSULTATIONS: IP CONSULT TO TELE-NEUROLOGY  IP CONSULT TO HOSPITALIST  IP CONSULT TO NEUROLOGY    PROCEDURES/SURGERIES: * No surgery found *    ADMITTING HPI from excerpted H&P   Bruce Crawford is a 69 y.o. male with past medical history of BPH,  CKD, substance abuse presents to the ED due to dizziness.  On 04/17/2024 around 10:30 PM patient was smoking crack and afterwards noticed dizziness.  He describes an episode of having to crawl from his neighbor's house to his house and having 2 episodes of nbnb emesis.  Patient recalls a similar episode of dizziness in 07/2023 and consider this a stroke.  Of note, patient was admitted to [facility] on 07/2023 and refused CTA and MRI brain. On discharge since stroke was not ruled out, the differential was vestibular migraine vs CVA without residual neurodeficits. Patient believes he had a stroke on 07/2023.      Patient arrived to the ED hypertensive blood pressure 144/83.  He complained of dizziness, gait issues, a pressure-like sensation on the back of his head.  He denied any neuro deficits. Code stroke initiated, NIHSS 0, CT head negative for acute ischemia or hemorrhage, CTA refused. Teleneurologist determined acute ischemic stroke of the posterior circulation versus possible vasoconstrictive syndrome given cocaine use. Patient not a  aid, he has all the DME equipment at home      PMHx   BPH -on flomax and finasteride  COPD -dounebs prn   Substance use  -cocaine positive on uds, denies use of other recreational drugs  Sciatica - on gabapentin  Social issues - possible insecure housing, in communication with case management    Update Addendum 04/21: Patient dizziness has resolved. Patient demonstrated performing a \"get and go test\" quickly and in a timely manner.   Grade 1 in less than 8 seconds.       PENDING TEST RESULTS:   At the time of discharge the following test results are still pending: CTA head and neck, MRI brain     FOLLOW UP APPOINTMENTS:  neurology, PCP, addiction medicine   [unfilled]     ADDITIONAL CARE RECOMMENDATIONS:     DIET: cardiac diet and low fat, low cholesterol diet    ACTIVITY: activity as tolerated    WOUND CARE: none    EQUIPMENT needed: DME equipment at home      DISCHARGE MEDICATIONS:     Medication List        STOP taking these medications      albuterol (2.5 MG/3ML) 0.083% nebulizer solution  Commonly known as: PROVENTIL     atorvastatin 40 MG tablet  Commonly known as: LIPITOR     butalbital-acetaminophen-caffeine -40 MG per tablet  Commonly known as: FIORICET, ESGIC     clopidogrel 75 MG tablet  Commonly known as: PLAVIX     meloxicam 7.5 MG tablet  Commonly known as: MOBIC     Trelegy Ellipta 100-62.5-25 MCG/ACT Aepb inhaler  Generic drug: fluticasone-umeclidin-vilant            ASK your doctor about these medications      amLODIPine 5 MG tablet  Commonly known as: NORVASC     aspirin 81 MG EC tablet     finasteride 5 MG tablet  Commonly known as: PROSCAR     gabapentin 100 MG capsule  Commonly known as: NEURONTIN  Take 1 capsule by mouth 3 times daily for 30 days. Max Daily Amount: 300 mg     predniSONE 20 MG tablet  Commonly known as: DELTASONE     tamsulosin 0.4 MG capsule  Commonly known as: FLOMAX                NOTIFY YOUR PHYSICIAN FOR ANY OF THE FOLLOWING:   Fever over 101 degrees for 24 hours.

## 2024-04-21 PROCEDURE — 2580000003 HC RX 258: Performed by: INTERNAL MEDICINE

## 2024-04-21 PROCEDURE — 94640 AIRWAY INHALATION TREATMENT: CPT

## 2024-04-21 PROCEDURE — 6360000002 HC RX W HCPCS: Performed by: INTERNAL MEDICINE

## 2024-04-21 PROCEDURE — 94760 N-INVAS EAR/PLS OXIMETRY 1: CPT

## 2024-04-21 PROCEDURE — 6370000000 HC RX 637 (ALT 250 FOR IP): Performed by: INTERNAL MEDICINE

## 2024-04-21 PROCEDURE — 6370000000 HC RX 637 (ALT 250 FOR IP): Performed by: HOSPITALIST

## 2024-04-21 PROCEDURE — 1200000000 HC SEMI PRIVATE

## 2024-04-21 RX ORDER — AMLODIPINE BESYLATE 5 MG/1
5 TABLET ORAL DAILY
Status: DISCONTINUED | OUTPATIENT
Start: 2024-04-21 | End: 2024-04-22 | Stop reason: HOSPADM

## 2024-04-21 RX ADMIN — ENOXAPARIN SODIUM 30 MG: 100 INJECTION SUBCUTANEOUS at 20:22

## 2024-04-21 RX ADMIN — ATORVASTATIN CALCIUM 80 MG: 40 TABLET, FILM COATED ORAL at 20:22

## 2024-04-21 RX ADMIN — ARFORMOTEROL TARTRATE 15 MCG: 15 SOLUTION RESPIRATORY (INHALATION) at 07:59

## 2024-04-21 RX ADMIN — TAMSULOSIN HYDROCHLORIDE 0.4 MG: 0.4 CAPSULE ORAL at 09:08

## 2024-04-21 RX ADMIN — GABAPENTIN 200 MG: 100 CAPSULE ORAL at 20:22

## 2024-04-21 RX ADMIN — FINASTERIDE 5 MG: 5 TABLET, FILM COATED ORAL at 09:07

## 2024-04-21 RX ADMIN — SODIUM CHLORIDE, PRESERVATIVE FREE 10 ML: 5 INJECTION INTRAVENOUS at 09:08

## 2024-04-21 RX ADMIN — ASPIRIN 325 MG: 325 TABLET ORAL at 09:08

## 2024-04-21 RX ADMIN — GABAPENTIN 200 MG: 100 CAPSULE ORAL at 09:07

## 2024-04-21 RX ADMIN — CLOPIDOGREL BISULFATE 75 MG: 75 TABLET ORAL at 09:07

## 2024-04-21 RX ADMIN — ARFORMOTEROL TARTRATE 15 MCG: 15 SOLUTION RESPIRATORY (INHALATION) at 19:28

## 2024-04-21 RX ADMIN — IPRATROPIUM BROMIDE AND ALBUTEROL SULFATE 1 DOSE: 2.5; .5 SOLUTION RESPIRATORY (INHALATION) at 19:28

## 2024-04-21 RX ADMIN — AMLODIPINE BESYLATE 5 MG: 5 TABLET ORAL at 09:07

## 2024-04-21 RX ADMIN — IPRATROPIUM BROMIDE AND ALBUTEROL SULFATE 1 DOSE: 2.5; .5 SOLUTION RESPIRATORY (INHALATION) at 07:59

## 2024-04-21 RX ADMIN — ENOXAPARIN SODIUM 30 MG: 100 INJECTION SUBCUTANEOUS at 09:07

## 2024-04-21 RX ADMIN — SODIUM CHLORIDE, PRESERVATIVE FREE 10 ML: 5 INJECTION INTRAVENOUS at 20:24

## 2024-04-21 RX ADMIN — GABAPENTIN 200 MG: 100 CAPSULE ORAL at 14:30

## 2024-04-21 ASSESSMENT — PAIN SCALES - GENERAL
PAINLEVEL_OUTOF10: 0

## 2024-04-21 NOTE — CARE COORDINATION
04/21/24 1232   Documentation for Discharge Appeal   Discharge Appealed by Patient   Date notifed by [facility]IO of appeal request: 04/19/24   Time notified by QIO of appeal request: [id number]  ([id number])   Detailed Notice of Discharge given to: Patient   Date Notice of Discharge given: 04/19/24   Date records sent to QIO 04/20/24   Time records sent to QIO 1159   Date Notified of Outcome 04/21/24   Outcome of appeal Agree with planned discharge     Beneficiary Liability Starts on 04/22/2024 at 12 noon. HINN 12 - Noncovered Continued Stay to be delivered to patient 4/21/2024.

## 2024-04-21 NOTE — PROGRESS NOTES
Patient able to perform a \"Get up and Go\" test independently and in a timely manner, grade 1. Patient endorses great relief and has been walking independently in his room throughout the day. Currently on meclizine.     Adan Robertson MD

## 2024-04-22 VITALS
WEIGHT: 245 LBS | TEMPERATURE: 98.5 F | BODY MASS INDEX: 33.18 KG/M2 | DIASTOLIC BLOOD PRESSURE: 82 MMHG | SYSTOLIC BLOOD PRESSURE: 158 MMHG | OXYGEN SATURATION: 96 % | RESPIRATION RATE: 17 BRPM | HEIGHT: 72 IN | HEART RATE: 87 BPM

## 2024-04-22 PROCEDURE — 94640 AIRWAY INHALATION TREATMENT: CPT

## 2024-04-22 PROCEDURE — 6360000002 HC RX W HCPCS: Performed by: INTERNAL MEDICINE

## 2024-04-22 RX ADMIN — ARFORMOTEROL TARTRATE 15 MCG: 15 SOLUTION RESPIRATORY (INHALATION) at 07:50

## 2024-04-22 NOTE — PROGRESS NOTES
Patient’s case reviewed during interdisciplinary team meeting in Med Surg/Tele Unit 2.  Rev. Valeria Carr MDiv, [facility], [facility]   Out of season

## 2024-04-22 NOTE — CARE COORDINATION
CM met w/ pt briefly to confirm discharge transportation. Pt stated that he lived close and was able to walk, but a ride would be better. Pt wants to  his medication at the [facility] pharmacy prior to leaving. CM will schedule a ride for pt through [employer] after pt and CM both know an estimated  time for his prescriptions.     Pt also asked CM for addiction recovery resources, stating he has been waiting to get information since he arrived. CM stated she would speak w/ her  and ensure he was given printed resources before leaving today. She also encouraged pt to contact the Care Coordination team w/ Samantha to assist w/ resources and programs in network. Pt stated he would call member services after returning home.     11:05am   let CM know recovery resources have already been discussed w/ pt and added to his AVS.   Pt's meds will be ready for  downstairs at 11:15am. CM scheduled Lyft ride for pt through [employer] to arrive outside [facility] pharmacy at 11:30am. CM will meet pt outside pharmacy to confirm he knows vehicle description.     11:23am  CM attempted to meet pt in pharmacy lobby and connect him w/ his .  called CM stating pt had left w/ out waiting for his ride. Lyft ride was canceled by [employer].     Nupur Mcginnis, NINA, MARCO

## 2024-04-22 NOTE — CARE COORDINATION
RUR: 9%    CM met with patient at bedside. CM introduce self before CM had chance to explain reason for visit patient states \" so what is the status on my paper\" CM explain that is reason for visit. Inform patient his appeal was denied patient states so I need to be out of here by 1200 noon tomorrow, CM confirm, CM provide copy of HINN patient sign and copy of DND.  patient states \"so what about transportation\" CM states \"what about it?\" He states he needs transportation states he has Medicaid, CM pulled patient facesheet no Medicaid listed only Medicare [employer], he states he has medicaid till the end of the month and that he can call [employer] and get a ride. CM attempt to explain to patient no Medicaid listed patient states \" you talk so loud you hurting my ear\" Please note patient has television loud and basketball game playing in the back ground, CM inform patient the TV is loud to please turn down the volume he states that will not help it, states I don't need to talk about transportation I can walk to my house.        Janay LARA

## 2024-04-22 NOTE — PLAN OF CARE
Problem: Discharge Planning  Goal: Discharge to home or other facility with appropriate resources  4/22/2024 1102 by Naila Barbour RN  Outcome: Adequate for Discharge  4/22/2024 0011 by Cheyenne Qiu RN  Outcome: Progressing  Flowsheets (Taken 4/21/2024 2013)  Discharge to home or other facility with appropriate resources: Identify barriers to discharge with patient and caregiver     Problem: Pain  Goal: Verbalizes/displays adequate comfort level or baseline comfort level  4/22/2024 1102 by Naila Barbour RN  Outcome: Adequate for Discharge  4/22/2024 0011 by Cheyenne Qiu RN  Outcome: Progressing     Problem: Safety - Adult  Goal: Free from fall injury  4/22/2024 1102 by Naila Barbour RN  Outcome: Adequate for Discharge  4/22/2024 0011 by Cheyenne Qiu RN  Outcome: Progressing     Problem: Neurosensory - Adult  Goal: Achieves stable or improved neurological status  Outcome: Adequate for Discharge     Problem: Musculoskeletal - Adult  Goal: Return mobility to safest level of function  Outcome: Adequate for Discharge

## 2024-04-22 NOTE — DISCHARGE SUMMARY
Reviewed discharge instructions with pt including follow-up appointments, medications and side effects,  self care education, signs/symptoms of stroke and heart attack, and MyChart information. Pt expressed understanding. IV was removed. Belongings sent home with pt.

## 2024-04-30 NOTE — PROGRESS NOTES
out stroke as the cause of his dizziness.    Query created by: Svetlana Hooks on 4/29/2024 6:31 AM      Electronically signed by:  Adan Robertson MD 4/30/2024 5:53 PM

## 2024-05-01 NOTE — CARE COORDINATION
BEST     Care Management Follow-up   Patient was given PCP follow-up and recovery resources.   Patient called today- discussed the Peer Recovery Counselor had tried to reach him.  He indicates he has multiple phones ( 9) - If something happens to one he uses another one.    Requested the contact # for Gogo   Provided this information   Address: [facility] [place] [address] [address] [address]  Hours: Open ? Closes 5?PM  Phone: [phone]    He indicates he will be calling-    Patient aware [facility] Stroke [facility]. He had CVA last year - He has follow-up with Neurology.     He has my # to call back for questions or additional resources.     Kylee KURTZ RN    [phone]

## 2024-05-06 ENCOUNTER — HOSPITAL ENCOUNTER (INPATIENT)
Facility: HOSPITAL | Age: 69
LOS: 4 days | Discharge: LEFT BEFORE DISCHARGE | End: 2024-05-10
Attending: STUDENT IN AN ORGANIZED HEALTH CARE EDUCATION/TRAINING PROGRAM | Admitting: HOSPITALIST
Payer: MEDICARE

## 2024-05-06 ENCOUNTER — APPOINTMENT (OUTPATIENT)
Facility: HOSPITAL | Age: 69
End: 2024-05-06
Payer: MEDICARE

## 2024-05-06 DIAGNOSIS — R42 DIZZINESS: Primary | ICD-10-CM

## 2024-05-06 DIAGNOSIS — M54.31 SCIATICA OF RIGHT SIDE: ICD-10-CM

## 2024-05-06 PROBLEM — R29.90 STROKE-LIKE SYMPTOM: Status: ACTIVE | Noted: 2024-05-06

## 2024-05-06 LAB
ALBUMIN SERPL-MCNC: 3.5 G/DL (ref 3.5–5)
ALBUMIN/GLOB SERPL: 1 (ref 1.1–2.2)
ALP SERPL-CCNC: 84 U/L (ref 45–117)
ALT SERPL-CCNC: 47 U/L (ref 12–78)
AMPHET UR QL SCN: NEGATIVE
ANION GAP SERPL CALC-SCNC: 10 MMOL/L (ref 5–15)
APPEARANCE UR: CLEAR
AST SERPL-CCNC: 25 U/L (ref 15–37)
BACTERIA URNS QL MICRO: NEGATIVE /HPF
BARBITURATES UR QL SCN: NEGATIVE
BASOPHILS # BLD: 0.1 K/UL (ref 0–0.1)
BASOPHILS NFR BLD: 1 % (ref 0–1)
BENZODIAZ UR QL: NEGATIVE
BILIRUB SERPL-MCNC: 0.3 MG/DL (ref 0.2–1)
BILIRUB UR QL: NEGATIVE
BUN SERPL-MCNC: 14 MG/DL (ref 6–20)
BUN/CREAT SERPL: 10 (ref 12–20)
CALCIUM SERPL-MCNC: 9 MG/DL (ref 8.5–10.1)
CANNABINOIDS UR QL SCN: NEGATIVE
CHLORIDE SERPL-SCNC: 106 MMOL/L (ref 97–108)
CO2 SERPL-SCNC: 28 MMOL/L (ref 21–32)
COCAINE UR QL SCN: POSITIVE
COLOR UR: NORMAL
CREAT SERPL-MCNC: 1.41 MG/DL (ref 0.7–1.3)
DIFFERENTIAL METHOD BLD: NORMAL
EOSINOPHIL # BLD: 0.2 K/UL (ref 0–0.4)
EOSINOPHIL NFR BLD: 2 % (ref 0–7)
EPITH CASTS URNS QL MICRO: NORMAL /LPF
ERYTHROCYTE [DISTWIDTH] IN BLOOD BY AUTOMATED COUNT: 13.4 % (ref 11.5–14.5)
GLOBULIN SER CALC-MCNC: 3.5 G/DL (ref 2–4)
GLUCOSE BLD STRIP.AUTO-MCNC: 84 MG/DL (ref 65–117)
GLUCOSE SERPL-MCNC: 86 MG/DL (ref 65–100)
GLUCOSE UR STRIP.AUTO-MCNC: NEGATIVE MG/DL
HCT VFR BLD AUTO: 43.4 % (ref 36.6–50.3)
HGB BLD-MCNC: 14.9 G/DL (ref 12.1–17)
HGB UR QL STRIP: NEGATIVE
IMM GRANULOCYTES # BLD AUTO: 0 K/UL (ref 0–0.04)
IMM GRANULOCYTES NFR BLD AUTO: 0 % (ref 0–0.5)
INR PPP: 1.1 (ref 0.9–1.1)
KETONES UR QL STRIP.AUTO: NEGATIVE MG/DL
LEUKOCYTE ESTERASE UR QL STRIP.AUTO: NEGATIVE
LYMPHOCYTES # BLD: 2.1 K/UL (ref 0.8–3.5)
LYMPHOCYTES NFR BLD: 24 % (ref 12–49)
Lab: ABNORMAL
MCH RBC QN AUTO: 30 PG (ref 26–34)
MCHC RBC AUTO-ENTMCNC: 34.3 G/DL (ref 30–36.5)
MCV RBC AUTO: 87.5 FL (ref 80–99)
METHADONE UR QL: NEGATIVE
MONOCYTES # BLD: 0.8 K/UL (ref 0–1)
MONOCYTES NFR BLD: 9 % (ref 5–13)
NEUTS SEG # BLD: 5.6 K/UL (ref 1.8–8)
NEUTS SEG NFR BLD: 64 % (ref 32–75)
NITRITE UR QL STRIP.AUTO: NEGATIVE
NRBC # BLD: 0 K/UL (ref 0–0.01)
NRBC BLD-RTO: 0 PER 100 WBC
OPIATES UR QL: NEGATIVE
PCP UR QL: NEGATIVE
PH UR STRIP: 7 (ref 5–8)
PLATELET # BLD AUTO: 304 K/UL (ref 150–400)
PMV BLD AUTO: 9.6 FL (ref 8.9–12.9)
POTASSIUM SERPL-SCNC: 3.6 MMOL/L (ref 3.5–5.1)
PROT SERPL-MCNC: 7 G/DL (ref 6.4–8.2)
PROT UR STRIP-MCNC: NEGATIVE MG/DL
PROTHROMBIN TIME: 11 SEC (ref 9–11.1)
RBC # BLD AUTO: 4.96 M/UL (ref 4.1–5.7)
RBC #/AREA URNS HPF: NORMAL /HPF (ref 0–5)
SERVICE CMNT-IMP: NORMAL
SODIUM SERPL-SCNC: 144 MMOL/L (ref 136–145)
SP GR UR REFRACTOMETRY: 1.01 (ref 1–1.03)
TROPONIN I SERPL HS-MCNC: 7 NG/L (ref 0–76)
TROPONIN I SERPL HS-MCNC: 8 NG/L (ref 0–76)
URINE CULTURE IF INDICATED: NORMAL
UROBILINOGEN UR QL STRIP.AUTO: 1 EU/DL (ref 0.2–1)
WBC # BLD AUTO: 8.8 K/UL (ref 4.1–11.1)
WBC URNS QL MICRO: NORMAL /HPF (ref 0–4)

## 2024-05-06 PROCEDURE — 99285 EMERGENCY DEPT VISIT HI MDM: CPT

## 2024-05-06 PROCEDURE — 6370000000 HC RX 637 (ALT 250 FOR IP): Performed by: HOSPITALIST

## 2024-05-06 PROCEDURE — 80307 DRUG TEST PRSMV CHEM ANLYZR: CPT

## 2024-05-06 PROCEDURE — 84484 ASSAY OF TROPONIN QUANT: CPT

## 2024-05-06 PROCEDURE — 81001 URINALYSIS AUTO W/SCOPE: CPT

## 2024-05-06 PROCEDURE — 70450 CT HEAD/BRAIN W/O DYE: CPT

## 2024-05-06 PROCEDURE — 6360000002 HC RX W HCPCS: Performed by: HOSPITALIST

## 2024-05-06 PROCEDURE — 93005 ELECTROCARDIOGRAM TRACING: CPT | Performed by: STUDENT IN AN ORGANIZED HEALTH CARE EDUCATION/TRAINING PROGRAM

## 2024-05-06 PROCEDURE — 80053 COMPREHEN METABOLIC PANEL: CPT

## 2024-05-06 PROCEDURE — 4A03X5D MEASUREMENT OF ARTERIAL FLOW, INTRACRANIAL, EXTERNAL APPROACH: ICD-10-PCS | Performed by: INTERNAL MEDICINE

## 2024-05-06 PROCEDURE — 82962 GLUCOSE BLOOD TEST: CPT

## 2024-05-06 PROCEDURE — 36415 COLL VENOUS BLD VENIPUNCTURE: CPT

## 2024-05-06 PROCEDURE — 85025 COMPLETE CBC W/AUTO DIFF WBC: CPT

## 2024-05-06 PROCEDURE — 85610 PROTHROMBIN TIME: CPT

## 2024-05-06 PROCEDURE — 2580000003 HC RX 258: Performed by: HOSPITALIST

## 2024-05-06 PROCEDURE — 6360000004 HC RX CONTRAST MEDICATION: Performed by: STUDENT IN AN ORGANIZED HEALTH CARE EDUCATION/TRAINING PROGRAM

## 2024-05-06 PROCEDURE — 1100000003 HC PRIVATE W/ TELEMETRY

## 2024-05-06 PROCEDURE — 70498 CT ANGIOGRAPHY NECK: CPT

## 2024-05-06 RX ORDER — SODIUM CHLORIDE 0.9 % (FLUSH) 0.9 %
5-40 SYRINGE (ML) INJECTION PRN
Status: DISCONTINUED | OUTPATIENT
Start: 2024-05-06 | End: 2024-05-10 | Stop reason: HOSPADM

## 2024-05-06 RX ORDER — FINASTERIDE 5 MG/1
5 TABLET, FILM COATED ORAL DAILY
Status: DISCONTINUED | OUTPATIENT
Start: 2024-05-07 | End: 2024-05-10 | Stop reason: HOSPADM

## 2024-05-06 RX ORDER — TAMSULOSIN HYDROCHLORIDE 0.4 MG/1
0.4 CAPSULE ORAL DAILY
Status: DISCONTINUED | OUTPATIENT
Start: 2024-05-07 | End: 2024-05-10 | Stop reason: HOSPADM

## 2024-05-06 RX ORDER — ONDANSETRON 4 MG/1
4 TABLET, ORALLY DISINTEGRATING ORAL EVERY 8 HOURS PRN
Status: DISCONTINUED | OUTPATIENT
Start: 2024-05-06 | End: 2024-05-10 | Stop reason: HOSPADM

## 2024-05-06 RX ORDER — SODIUM CHLORIDE 0.9 % (FLUSH) 0.9 %
5-40 SYRINGE (ML) INJECTION EVERY 12 HOURS SCHEDULED
Status: DISCONTINUED | OUTPATIENT
Start: 2024-05-06 | End: 2024-05-10 | Stop reason: HOSPADM

## 2024-05-06 RX ORDER — AMLODIPINE BESYLATE 5 MG/1
5 TABLET ORAL DAILY
Status: DISCONTINUED | OUTPATIENT
Start: 2024-05-07 | End: 2024-05-10 | Stop reason: HOSPADM

## 2024-05-06 RX ORDER — ASPIRIN 81 MG/1
81 TABLET, CHEWABLE ORAL DAILY
Status: DISCONTINUED | OUTPATIENT
Start: 2024-05-06 | End: 2024-05-07

## 2024-05-06 RX ORDER — POLYETHYLENE GLYCOL 3350 17 G/17G
17 POWDER, FOR SOLUTION ORAL DAILY PRN
Status: DISCONTINUED | OUTPATIENT
Start: 2024-05-06 | End: 2024-05-10 | Stop reason: HOSPADM

## 2024-05-06 RX ORDER — ONDANSETRON 2 MG/ML
4 INJECTION INTRAMUSCULAR; INTRAVENOUS EVERY 6 HOURS PRN
Status: DISCONTINUED | OUTPATIENT
Start: 2024-05-06 | End: 2024-05-10 | Stop reason: HOSPADM

## 2024-05-06 RX ORDER — CLOPIDOGREL BISULFATE 75 MG/1
75 TABLET ORAL DAILY
Status: DISCONTINUED | OUTPATIENT
Start: 2024-05-07 | End: 2024-05-10 | Stop reason: HOSPADM

## 2024-05-06 RX ORDER — MECLIZINE HCL 12.5 MG/1
25 TABLET ORAL 3 TIMES DAILY PRN
Status: DISCONTINUED | OUTPATIENT
Start: 2024-05-06 | End: 2024-05-10 | Stop reason: HOSPADM

## 2024-05-06 RX ORDER — SODIUM CHLORIDE 9 MG/ML
INJECTION, SOLUTION INTRAVENOUS PRN
Status: DISCONTINUED | OUTPATIENT
Start: 2024-05-06 | End: 2024-05-10 | Stop reason: HOSPADM

## 2024-05-06 RX ORDER — ATORVASTATIN CALCIUM 40 MG/1
80 TABLET, FILM COATED ORAL NIGHTLY
Status: DISCONTINUED | OUTPATIENT
Start: 2024-05-06 | End: 2024-05-10 | Stop reason: HOSPADM

## 2024-05-06 RX ORDER — ENOXAPARIN SODIUM 100 MG/ML
30 INJECTION SUBCUTANEOUS 2 TIMES DAILY
Status: DISCONTINUED | OUTPATIENT
Start: 2024-05-06 | End: 2024-05-10 | Stop reason: HOSPADM

## 2024-05-06 RX ORDER — ASPIRIN 300 MG/1
300 SUPPOSITORY RECTAL DAILY
Status: DISCONTINUED | OUTPATIENT
Start: 2024-05-06 | End: 2024-05-07

## 2024-05-06 RX ORDER — GABAPENTIN 100 MG/1
100 CAPSULE ORAL 3 TIMES DAILY
Status: DISCONTINUED | OUTPATIENT
Start: 2024-05-06 | End: 2024-05-10 | Stop reason: HOSPADM

## 2024-05-06 RX ADMIN — MECLIZINE 25 MG: 12.5 TABLET ORAL at 23:17

## 2024-05-06 RX ADMIN — ATORVASTATIN CALCIUM 80 MG: 40 TABLET, FILM COATED ORAL at 22:57

## 2024-05-06 RX ADMIN — GABAPENTIN 100 MG: 100 CAPSULE ORAL at 22:57

## 2024-05-06 RX ADMIN — ASPIRIN 81 MG: 81 TABLET, CHEWABLE ORAL at 21:26

## 2024-05-06 RX ADMIN — IOPAMIDOL 100 ML: 755 INJECTION, SOLUTION INTRAVENOUS at 18:46

## 2024-05-06 RX ADMIN — ENOXAPARIN SODIUM 30 MG: 100 INJECTION SUBCUTANEOUS at 21:21

## 2024-05-06 RX ADMIN — SODIUM CHLORIDE, PRESERVATIVE FREE 10 ML: 5 INJECTION INTRAVENOUS at 21:21

## 2024-05-06 ASSESSMENT — PAIN DESCRIPTION - LOCATION: LOCATION: HEAD

## 2024-05-06 ASSESSMENT — PAIN - FUNCTIONAL ASSESSMENT: PAIN_FUNCTIONAL_ASSESSMENT: 0-10

## 2024-05-06 ASSESSMENT — PAIN SCALES - GENERAL: PAINLEVEL_OUTOF10: 0

## 2024-05-06 NOTE — ED NOTES
Pt presents to ED complaining of dizziness and feeling like he is having another stroke. When asked when the pt last had a stroke he stated last week. Pt is slightly argumentative with staff including MD stating we should know the sx. Pt originally refused IV in AC but later allowed AAYUSH Chacon to put am IV in his left forearm. Pt states he is on blood thinners. Pt also states he is nauseous with movement and endorses SOB when laying flat. Pt is alert and oriented x 4, RR even and unlabored, skin is warm and dry. Pt appears in NAD at this time. Assessment completed and pt updated on plan of care.  Call bell in reach.  Emergency Department Nursing Plan of Care  The Nursing Plan of Care is developed from the Nursing assessment and Emergency Department Attending provider initial evaluation.  The plan of care may be reviewed in the “ED Provider note”.  The Plan of Care was developed with the following considerations:  Patient / Family readiness to learn indicated by:Refer to Medical chart in [facility]  Persons(s) to be included in education: Refer to Medical chart in [facility]  Barriers to Learning/Limitations:Normal

## 2024-05-06 NOTE — ED PROVIDER NOTES
POC Glucose 84 65 - 117 mg/dL    Performed by: Rsoalind Chacon RN    CBC with Auto Differential    Collection Time: 05/06/24  6:30 PM   Result Value Ref Range    WBC 8.8 4.1 - 11.1 K/uL    RBC 4.96 4.10 - 5.70 M/uL    Hemoglobin 14.9 12.1 - 17.0 g/dL    Hematocrit 43.4 36.6 - 50.3 %    MCV 87.5 80.0 - 99.0 FL    MCH 30.0 26.0 - 34.0 PG    MCHC 34.3 30.0 - 36.5 g/dL    RDW 13.4 11.5 - 14.5 %    Platelets 304 150 - 400 K/uL    MPV 9.6 8.9 - 12.9 FL    Nucleated RBCs 0.0 0  WBC    nRBC 0.00 0.00 - 0.01 K/uL    Neutrophils % 64 32 - 75 %    Lymphocytes % 24 12 - 49 %    Monocytes % 9 5 - 13 %    Eosinophils % 2 0 - 7 %    Basophils % 1 0 - 1 %    Immature Granulocytes % 0 0.0 - 0.5 %    Neutrophils Absolute 5.6 1.8 - 8.0 K/UL    Lymphocytes Absolute 2.1 0.8 - 3.5 K/UL    Monocytes Absolute 0.8 0.0 - 1.0 K/UL    Eosinophils Absolute 0.2 0.0 - 0.4 K/UL    Basophils Absolute 0.1 0.0 - 0.1 K/UL    Immature Granulocytes Absolute 0.0 0.00 - 0.04 K/UL    Differential Type AUTOMATED     Comprehensive Metabolic Panel    Collection Time: 05/06/24  6:30 PM   Result Value Ref Range    Sodium 144 136 - 145 mmol/L    Potassium 3.6 3.5 - 5.1 mmol/L    Chloride 106 97 - 108 mmol/L    CO2 28 21 - 32 mmol/L    Anion Gap 10 5 - 15 mmol/L    Glucose 86 65 - 100 mg/dL    BUN 14 6 - 20 MG/DL    Creatinine 1.41 (H) 0.70 - 1.30 MG/DL    Bun/Cre Ratio 10 (L) 12 - 20      Est, Glom Filt Rate 53 (L) >60 ml/min/1.73m2    Calcium 9.0 8.5 - 10.1 MG/DL    Total Bilirubin 0.3 0.2 - 1.0 MG/DL    ALT 47 12 - 78 U/L    AST 25 15 - 37 U/L    Alk Phosphatase 84 45 - 117 U/L    Total Protein 7.0 6.4 - 8.2 g/dL    Albumin 3.5 3.5 - 5.0 g/dL    Globulin 3.5 2.0 - 4.0 g/dL    Albumin/Globulin Ratio 1.0 (L) 1.1 - 2.2     Protime-INR    Collection Time: 05/06/24  6:30 PM   Result Value Ref Range    INR 1.1 0.9 - 1.1      Protime 11.0 9.0 - 11.1 sec   Troponin    Collection Time: 05/06/24  6:30 PM   Result Value Ref Range    Troponin, High Sensitivity 7 0  large vessel occlusion or hemodynamically significant carotid stenosis.   Sequela of chronic microvascular angiopathy in the periventricular white matter.      CT HEAD WO CONTRAST   Final Result   No evidence of acute intracranial abnormality. No interval change in appearance.            MRI brain without contrast    (Results Pending)     [unfilled]  [unfilled]      Medical Decision Making   I am the first provider for this patient.    I reviewed the vital signs, available nursing notes, past medical history, past surgical history, family history and social history.    Vital Signs-Reviewed the patient's vital signs.  [unfilled]      Provider Notes (Medical Decision Making):   69-year-old presenting with dizziness, gait instability and right upper extremity weakness.  Differential includes CVA, TIA, vasospasm, migraine, intracranial lesion.Onset of symptoms at 11 AM.  Level 2 stroke alert initiated.    ED Course:     .    Medications   sodium chloride flush 0.9 % injection 5-40 mL (10 mLs IntraVENous Given 5/6/24 2121)   sodium chloride flush 0.9 % injection 5-40 mL (has no administration in time range)   0.9 % sodium chloride infusion (has no administration in time range)   ondansetron (ZOFRAN-ODT) disintegrating tablet 4 mg (has no administration in time range)     Or   ondansetron (ZOFRAN) injection 4 mg (has no administration in time range)   polyethylene glycol (GLYCOLAX) packet 17 g (has no administration in time range)   enoxaparin Sodium (LOVENOX) injection 30 mg (30 mg SubCUTAneous Given 5/6/24 2121)   aspirin chewable tablet 81 mg (81 mg Oral Given 5/6/24 2126)     Or   aspirin suppository 300 mg ( Rectal See Alternative 5/6/24 2126)   iopamidol (ISOVUE-370) 76 % injection 100 mL (100 mLs IntraVENous Given 5/6/24 1846)   CT head shows no acute intracranial abnormality, CTA shows no large vessel occlusion, sequelae of chronic microvascular angiopathy.    ED Course as of 05/06/24 2132   Mon May 06, 2024   [id number] I

## 2024-05-06 NOTE — ED NOTES
Pt came in stated he felt like he is having another stroke. He was asked when he had a stroke he stated last week. Pt is argumentative with staff including MD stating we should just know his symptoms. Pt refused IV in A/C. Pt refused 20 gauge in his hand or anything greater than a 22 gauge in his hand. It was explained if we have imaging to do we need one in the A/C. Pt refusing labs at this time, stating MD still at bedside let her finish seeing me.

## 2024-05-06 NOTE — ED NOTES
RN assumed care of patient at this time. Bedside/verbal report received from Ines RN at this time. Patient AAOx4 in supine position. Patient on continuous cardiac monitoring, BP , and SpO2. Stretcher in lowest locked position and call bell within reach.

## 2024-05-06 NOTE — ED NOTES
1817-Dr. Cooley at bedside     1823-Blood Glucose 84    1826-Level 2 Code stroke called     1829-Pt taken to CT/Tele-Neuro Contacted

## 2024-05-07 ENCOUNTER — APPOINTMENT (OUTPATIENT)
Facility: HOSPITAL | Age: 69
End: 2024-05-07
Payer: MEDICARE

## 2024-05-07 LAB
CHOLEST SERPL-MCNC: 108 MG/DL
EKG ATRIAL RATE: 75 BPM
EKG DIAGNOSIS: NORMAL
EKG P AXIS: 30 DEGREES
EKG P-R INTERVAL: 146 MS
EKG Q-T INTERVAL: 394 MS
EKG QRS DURATION: 102 MS
EKG QTC CALCULATION (BAZETT): 439 MS
EKG R AXIS: -51 DEGREES
EKG T AXIS: 38 DEGREES
EKG VENTRICULAR RATE: 75 BPM
ERYTHROCYTE [DISTWIDTH] IN BLOOD BY AUTOMATED COUNT: 13.6 % (ref 11.5–14.5)
EST. AVERAGE GLUCOSE BLD GHB EST-MCNC: 117 MG/DL
HBA1C MFR BLD: 5.7 % (ref 4–5.6)
HCT VFR BLD AUTO: 41.6 % (ref 36.6–50.3)
HDLC SERPL-MCNC: 54 MG/DL
HDLC SERPL: 2 (ref 0–5)
HGB BLD-MCNC: 13.5 G/DL (ref 12.1–17)
LDLC SERPL CALC-MCNC: 43.4 MG/DL (ref 0–100)
MCH RBC QN AUTO: 29.7 PG (ref 26–34)
MCHC RBC AUTO-ENTMCNC: 32.5 G/DL (ref 30–36.5)
MCV RBC AUTO: 91.4 FL (ref 80–99)
NRBC # BLD: 0 K/UL (ref 0–0.01)
NRBC BLD-RTO: 0 PER 100 WBC
PLATELET # BLD AUTO: 258 K/UL (ref 150–400)
PMV BLD AUTO: 9.3 FL (ref 8.9–12.9)
RBC # BLD AUTO: 4.55 M/UL (ref 4.1–5.7)
TRIGL SERPL-MCNC: 53 MG/DL
VLDLC SERPL CALC-MCNC: 10.6 MG/DL
WBC # BLD AUTO: 7.8 K/UL (ref 4.1–11.1)

## 2024-05-07 PROCEDURE — 6360000002 HC RX W HCPCS: Performed by: INTERNAL MEDICINE

## 2024-05-07 PROCEDURE — 6370000000 HC RX 637 (ALT 250 FOR IP): Performed by: HOSPITALIST

## 2024-05-07 PROCEDURE — 6360000002 HC RX W HCPCS: Performed by: HOSPITALIST

## 2024-05-07 PROCEDURE — 94640 AIRWAY INHALATION TREATMENT: CPT

## 2024-05-07 PROCEDURE — 6360000002 HC RX W HCPCS: Performed by: PSYCHIATRY & NEUROLOGY

## 2024-05-07 PROCEDURE — 70551 MRI BRAIN STEM W/O DYE: CPT

## 2024-05-07 PROCEDURE — 6370000000 HC RX 637 (ALT 250 FOR IP): Performed by: INTERNAL MEDICINE

## 2024-05-07 PROCEDURE — 94760 N-INVAS EAR/PLS OXIMETRY 1: CPT

## 2024-05-07 PROCEDURE — 80061 LIPID PANEL: CPT

## 2024-05-07 PROCEDURE — 94761 N-INVAS EAR/PLS OXIMETRY MLT: CPT

## 2024-05-07 PROCEDURE — 2580000003 HC RX 258: Performed by: HOSPITALIST

## 2024-05-07 PROCEDURE — 93010 ELECTROCARDIOGRAM REPORT: CPT | Performed by: SPECIALIST

## 2024-05-07 PROCEDURE — 36415 COLL VENOUS BLD VENIPUNCTURE: CPT

## 2024-05-07 PROCEDURE — 85027 COMPLETE CBC AUTOMATED: CPT

## 2024-05-07 PROCEDURE — G0426 INPT/ED TELECONSULT50: HCPCS | Performed by: PSYCHIATRY & NEUROLOGY

## 2024-05-07 PROCEDURE — 1100000003 HC PRIVATE W/ TELEMETRY

## 2024-05-07 PROCEDURE — 83036 HEMOGLOBIN GLYCOSYLATED A1C: CPT

## 2024-05-07 PROCEDURE — 94664 DEMO&/EVAL PT USE INHALER: CPT

## 2024-05-07 PROCEDURE — 97161 PT EVAL LOW COMPLEX 20 MIN: CPT | Performed by: PHYSICAL THERAPIST

## 2024-05-07 PROCEDURE — 6370000000 HC RX 637 (ALT 250 FOR IP): Performed by: PSYCHIATRY & NEUROLOGY

## 2024-05-07 RX ORDER — LORAZEPAM 2 MG/ML
0.5 INJECTION INTRAMUSCULAR ONCE
Status: COMPLETED | OUTPATIENT
Start: 2024-05-07 | End: 2024-05-07

## 2024-05-07 RX ORDER — FLUTICASONE FUROATE, UMECLIDINIUM BROMIDE AND VILANTEROL TRIFENATATE 100; 62.5; 25 UG/1; UG/1; UG/1
1 POWDER RESPIRATORY (INHALATION)
Status: ON HOLD | COMMUNITY
End: 2024-05-09

## 2024-05-07 RX ORDER — ASPIRIN 325 MG
325 TABLET ORAL DAILY
Status: DISCONTINUED | OUTPATIENT
Start: 2024-05-07 | End: 2024-05-10 | Stop reason: HOSPADM

## 2024-05-07 RX ORDER — IPRATROPIUM BROMIDE AND ALBUTEROL SULFATE 2.5; .5 MG/3ML; MG/3ML
1 SOLUTION RESPIRATORY (INHALATION) EVERY 4 HOURS PRN
Status: DISCONTINUED | OUTPATIENT
Start: 2024-05-07 | End: 2024-05-10 | Stop reason: HOSPADM

## 2024-05-07 RX ORDER — BUTALBITAL, ACETAMINOPHEN AND CAFFEINE 50; 325; 40 MG/1; MG/1; MG/1
1 TABLET ORAL EVERY 4 HOURS PRN
Status: DISCONTINUED | OUTPATIENT
Start: 2024-05-07 | End: 2024-05-10 | Stop reason: HOSPADM

## 2024-05-07 RX ORDER — PROCHLORPERAZINE EDISYLATE 5 MG/ML
10 INJECTION INTRAMUSCULAR; INTRAVENOUS EVERY 6 HOURS PRN
Status: DISCONTINUED | OUTPATIENT
Start: 2024-05-07 | End: 2024-05-10 | Stop reason: HOSPADM

## 2024-05-07 RX ORDER — IPRATROPIUM BROMIDE AND ALBUTEROL SULFATE 2.5; .5 MG/3ML; MG/3ML
1 SOLUTION RESPIRATORY (INHALATION) ONCE
Status: COMPLETED | OUTPATIENT
Start: 2024-05-07 | End: 2024-05-07

## 2024-05-07 RX ORDER — MAGNESIUM SULFATE IN WATER 40 MG/ML
2000 INJECTION, SOLUTION INTRAVENOUS ONCE
Status: COMPLETED | OUTPATIENT
Start: 2024-05-07 | End: 2024-05-07

## 2024-05-07 RX ADMIN — ENOXAPARIN SODIUM 30 MG: 100 INJECTION SUBCUTANEOUS at 20:38

## 2024-05-07 RX ADMIN — LORAZEPAM 0.5 MG: 2 INJECTION INTRAMUSCULAR; INTRAVENOUS at 15:00

## 2024-05-07 RX ADMIN — LORAZEPAM 0.5 MG: 2 INJECTION INTRAMUSCULAR; INTRAVENOUS at 14:34

## 2024-05-07 RX ADMIN — GABAPENTIN 100 MG: 100 CAPSULE ORAL at 08:37

## 2024-05-07 RX ADMIN — MECLIZINE 25 MG: 12.5 TABLET ORAL at 08:45

## 2024-05-07 RX ADMIN — ASPIRIN 81 MG: 81 TABLET, CHEWABLE ORAL at 08:37

## 2024-05-07 RX ADMIN — GABAPENTIN 100 MG: 100 CAPSULE ORAL at 14:33

## 2024-05-07 RX ADMIN — ENOXAPARIN SODIUM 30 MG: 100 INJECTION SUBCUTANEOUS at 08:38

## 2024-05-07 RX ADMIN — BUTALBITAL, ACETAMINOPHEN AND CAFFEINE 1 TABLET: 325; 50; 40 TABLET ORAL at 20:38

## 2024-05-07 RX ADMIN — IPRATROPIUM BROMIDE AND ALBUTEROL SULFATE 1 DOSE: 2.5; .5 SOLUTION RESPIRATORY (INHALATION) at 03:56

## 2024-05-07 RX ADMIN — TAMSULOSIN HYDROCHLORIDE 0.4 MG: 0.4 CAPSULE ORAL at 08:37

## 2024-05-07 RX ADMIN — CLOPIDOGREL BISULFATE 75 MG: 75 TABLET, FILM COATED ORAL at 08:37

## 2024-05-07 RX ADMIN — MECLIZINE 25 MG: 12.5 TABLET ORAL at 20:38

## 2024-05-07 RX ADMIN — MECLIZINE 25 MG: 12.5 TABLET ORAL at 14:33

## 2024-05-07 RX ADMIN — SODIUM CHLORIDE: 9 INJECTION, SOLUTION INTRAVENOUS at 20:45

## 2024-05-07 RX ADMIN — ATORVASTATIN CALCIUM 80 MG: 40 TABLET, FILM COATED ORAL at 20:38

## 2024-05-07 RX ADMIN — GABAPENTIN 100 MG: 100 CAPSULE ORAL at 20:38

## 2024-05-07 RX ADMIN — FINASTERIDE 5 MG: 5 TABLET, FILM COATED ORAL at 08:37

## 2024-05-07 RX ADMIN — AMLODIPINE BESYLATE 5 MG: 5 TABLET ORAL at 08:37

## 2024-05-07 RX ADMIN — SODIUM CHLORIDE, PRESERVATIVE FREE 10 ML: 5 INJECTION INTRAVENOUS at 20:38

## 2024-05-07 RX ADMIN — SODIUM CHLORIDE, PRESERVATIVE FREE 10 ML: 5 INJECTION INTRAVENOUS at 08:38

## 2024-05-07 RX ADMIN — IPRATROPIUM BROMIDE AND ALBUTEROL SULFATE 1 DOSE: 2.5; .5 SOLUTION RESPIRATORY (INHALATION) at 22:21

## 2024-05-07 RX ADMIN — MAGNESIUM SULFATE HEPTAHYDRATE 2000 MG: 40 INJECTION, SOLUTION INTRAVENOUS at 20:46

## 2024-05-07 ASSESSMENT — PAIN SCALES - GENERAL
PAINLEVEL_OUTOF10: 0
PAINLEVEL_OUTOF10: 0
PAINLEVEL_OUTOF10: 4

## 2024-05-07 ASSESSMENT — PAIN DESCRIPTION - DESCRIPTORS: DESCRIPTORS: ACHING

## 2024-05-07 ASSESSMENT — PAIN DESCRIPTION - LOCATION: LOCATION: HEAD

## 2024-05-07 NOTE — PROGRESS NOTES
[facility] Admission Pharmacy Medication Reconciliation    Information obtained from:Patient, RxQuery  RxQuery data available1:Yes    Comments/recommendations:  Interviewed patient at bedside to confirm PTA meds, allergies, and pharmacy of choice.  Patient had all medications in backpack - confirmed dosing on bottles.  Patient states he does not have a PRN albuterol b/c it was discontinued when he got diagnosed with COPD.  He is prescribed Trelegy Ellipta, uses PRN \"I use it as needed, if I need once I use it once, if I need it 5 times I use it 5 times in a day\"  The [place] Prescription Monitoring Program () was accessed to determine fill history of any controlled medications. Last (and only dispense in 2024): 4/22/24 Gabapentin 100mg cap #90/30 day supply.    Medication changes (since last review):  Added  Trelegy Ellipta   Removed  none  Adjusted  none       1RxQuery pharmacy benefit data reflects medications filled and processed through the patient's insurance, however                this data does NOT capture whether the medication was picked up or is currently being taken by the patient.         Patient allergies:   Allergies as of 05/06/2024    (No Known Allergies)         Prior to Admission Medications   Prescriptions Last Dose Informant   amLODIPine (NORVASC) 5 MG tablet 5/5/2024 Self   Sig: Take 1 tablet by mouth daily   aspirin 325 MG tablet 5/5/2024 Self   Sig: Take 1 tablet by mouth daily   atorvastatin (LIPITOR) 80 MG tablet 5/5/2024 Self   Sig: Take 1 tablet by mouth nightly   clopidogrel (PLAVIX) 75 MG tablet 5/5/2024 Self   Sig: Take 1 tablet by mouth daily   finasteride (PROSCAR) 5 MG tablet 5/5/2024 Self   Sig: Take 1 tablet by mouth daily   fluticasone-umeclidin-vilant (TRELEGY ELLIPTA) 100-62.5-25 MCG/ACT AEPB inhaler 5/7/2024 Self   Sig: Inhale 1 puff into the lungs as needed.   gabapentin (NEURONTIN) 100 MG capsule 5/5/2024 Self   Sig: Take 1 capsule by mouth 3 times daily for 30 days. Max Daily

## 2024-05-07 NOTE — PLAN OF CARE
Problem: Discharge Planning  Goal: Discharge to home or other facility with appropriate resources  Outcome: Progressing     Problem: Pain  Goal: Verbalizes/displays adequate comfort level or baseline comfort level  5/7/2024 0940 by Amanda Salazar, RN  Outcome: Progressing  5/7/2024 0021 by Caitlyn Tenorio, RN  Outcome: Progressing

## 2024-05-07 NOTE — PROGRESS NOTES
Spiritual Care Assessment/Progress Note  [facility]    Name: Bruce Crawford MRN: [id number]    Age: 69 y.o.     Sex: male   Language: English     Date: 5/7/2024            Total Time Calculated: 15 min              Spiritual Assessment begun in [facility] 2 MED SURG & TELE  Service Provided For: Patient     Encounter Overview/Reason: Initial Encounter, Spiritual/Emotional Needs    Spiritual beliefs:      [x] Involved in a saskia tradition/spiritual practice: Nondenominational     [] Supported by a s[place]ia community:      [] Claims no spiritual orientation:      [] Seeking spiritual identity:           [] Adheres to an individual form of spirituality:      [] Not able to assess:                Identified resources for coping and support system:           [] Prayer                  [] Devotional reading               [] Music                  [] Guided Imagery     [] Pet visits                                        [] Other: (COMMENT)     Specific area/focus of visit   Encounter:    Crisis:    Spiritual/Emotional needs: Type: Spiritual Support  Ritual, Rites and Sacraments:    Grief, Loss, and Adjustments:    Ethics/Mediation:    Behavioral Health:    Palliative Care:    Advance Care Planning:      Plan/Referrals: Continue Support (comment) (Advised of  availability)    Narrative: Visited with patient while rounding in the Med Surg/Tele Unit 2. Introduced myself and offered spiritual care and emotional support. Patient shared that he is Nondenominational and has support if he chooses to reach out for support.  provided ministry of presence, informed patient of Mass schedule, and assurance of prayers. Advised of  availability.  Rev. Valeria Carr MDiv,

## 2024-05-07 NOTE — PROGRESS NOTES
Occupational Therapy Note:  Orders received and appreciated.  Chart reviewed.  Spoke with pt.  Pt re-admitted for persistent dizziness and HA.  Despite these symptoms, pt was observed making his bed, performing all ADLs and amb in room and hallway with independence.  No skilled OT is required at this time and OT order will be completed.  Thank you for this consult.  Kortney Woods, OTR/L, CBIS

## 2024-05-07 NOTE — PROGRESS NOTES
0730: verbal report received from AAYUSH Brady.  Introduced to pt and whiteboard updated    0837: morning meds given, pt requesting meclizine prn at this time.  MRI screening completed.pt refusing lab work at this time    1030: pt calling out stating he was feeling anxious.  Pt informed there is anxiety medications ordered for MRI.  Deep breathing encouraged.    4425: verbal report given to AAYUSH Dempsey

## 2024-05-07 NOTE — PROGRESS NOTES
PHYSICAL THERAPY EVALUATION/DISCHARGE    Patient: Bruce Crawford (69 y.o. male)  Date: 5/7/2024  Primary Diagnosis: Dizziness [R42]  Stroke-like symptom [R29.90]       Precautions:                        ASSESSMENT AND RECOMMENDATIONS:  Based on the objective data below, the patient is limited by persistent dizziness and headache. Patient is known to PT from previous admissions including most recent prior admission on 4/18/24. Despite continued dizziness and headache patient showed improved functional mobility and ADL performance since last visit. Patient was able to make his bed, don shoes, tie shoes, transfer independently, and toilet independently during PT exam. Patient ambulated in [address] independently, he refused use of gait belt or assistance from PT. Base on PT exam patient has no acute PT needs, but would likely benefit from continued safety education from staff.    Functional Outcome Measure:  The patient scored 23/24 on the [facility] Mobility outcome measure.      Further skilled acute physical therapy is not indicated at this time.       PLAN :  Recommendation for discharge: (in order for the patient to meet his/her long term goals): No skilled physical therapy    Other factors to consider for discharge: no additional factors    IF patient discharges home will need the following DME: patient owns DME required for discharge       SUBJECTIVE:   Patient stated “I am dizzy when I wake up, I am dizzy during the day, and I'm dizzy at night, but I sleep fine. I've slept five times since getting my medications this morning.”    OBJECTIVE DATA SUMMARY:     Past Medical History:   Diagnosis Date   • Frequent headaches    • Hypertension    • Muscle pain    • Neuropathy    • Visual disturbance      Past Surgical History:   Procedure Laterality Date   • HERNIA REPAIR      times 2       Home Situation and Prior Level of Function:   Social/Functional History  Lives With: Alone  Type of Home: House  Home Layout: Two

## 2024-05-07 NOTE — PROGRESS NOTES
Patient’s case reviewed during interdisciplinary team meeting in Med Surg/Tele Unit 2.  Rev. Valeria Carr MDiv, [facility]

## 2024-05-07 NOTE — PROGRESS NOTES
0354-Message to on call provider Dr. Lane:    Patient admitted for dizziness. Hx of COPD, HTN, BPH, substance abuse and sciatica. Patient is c/o SOB and is requesting neb tx and does not have an order on MAR. Please order?

## 2024-05-07 NOTE — ED NOTES
TRANSFER - OUT REPORT:    Verbal report given to Caitlyn LOONEY on Bruce Crawford  being transferred to [facility]  for routine progression of patient care       Report consisted of patient's Situation, Background, Assessment and   Recommendations(SBAR).     Information from the following report(s) Nurse Handoff Report, Index, ED Encounter Summary, ED SBAR, Adult Overview, Surgery Report, Intake/Output, MAR, Recent Results, Med Rec Status, and Cardiac Rhythm sinus rhythm  was reviewed with the receiving nurse.    [place] [facility] Assessment:    Presents to emergency department  because of falls (Syncope, seizure, or loss of consciousness): No  Age > 70: Yes  Altered Mental Status, Intoxication with alcohol or substance confusion (Disorientation, impaired judgment, poor safety awaremess, or inability to follow instructions): No  Impaired Mobility: Ambulates or transfers with assistive devices or assistance; Unable to ambulate or transer.: No  Nursing Judgement: No          Lines:   Peripheral IV 05/06/24 Left;Dorsal Forearm (Active)   Site Assessment Clean, dry & intact 05/06/24 1853   Line Status Blood return noted;Flushed;Normal saline locked 05/06/24 1853   Line Care Cap changed;Connections checked and tightened 05/06/24 1853   Phlebitis Assessment No symptoms 05/06/24 1853   Infiltration Assessment 0 05/06/24 1853   Dressing Status Clean, dry & intact 05/06/24 1853   Dressing Type Transparent 05/06/24 1853        Opportunity for questions and clarification was provided.      Patient transported with:  Monitor and Registered Nurse

## 2024-05-07 NOTE — CONSULTS
[facility] NEUROLOGY CONSULTATION       Chief Complaint/Admission Diagnosis: Dizziness [R42]  Stroke-like symptom [R29.90]     I have been asked to see this 69 y.o. male in neurological consultation by Ifrah Taylor MD to render advice and opinion regarding Dizziness     ?     Impression:  69-year-old man with history of prior CVA presents for evaluation of dizziness, headache, right upper extremity with numbness and weakness.  Workup did not reveal acute infarct.      Recommendations:   -Meclizine, anti-emetics  -PT evaluation  -Abortive headache regimen: Compazine 10 mg once, magnesium sulfate 2 g IV once, Toradol 30 mg IM/IV once, Solu-Medrol 500 mg IV once, if no improvement, try Depakote 200 mg IV once.  100% oxygen can be used if none of the above medications help.  -continue stroke prevention measures   -Neurology will sign off. Please call is any further questions  HPI:   History was obtained from a review of the electronic record and from the patient and family.??   68 yo man presents for evaluation of dizziness and unsteadiness that started on the day of presentation around 11:00 in the morning.  He also reported mild headache, nausea and generalized weakness, blurry vision.  He noted weakness and numbness in the right upper extremity he reports a prior history of stroke with similar symptoms.    Review of Symptoms:     A ten system review of constitutional, cardiovascular, respiratory, musculoskeletal, endocrine, skin, HEENT, genitourinary, neurological, and psychiatric systems was obtained and is negative except as noted above in HPI.     Laboratory reviewed shows creatinine 1.41 urine positive for cocaine.  CT head without contrast shows no evidence of acute intracranial abnormalities.  CTA of the head and neck revealed no evidence of large vessel occlusion or see hemodynamically significant artery stenosis  ?  Exam:   /81   Pulse 61   Temp 98.4 °F (36.9 °C) (Oral)   Resp 16    intracranial abnormality. No interval change in appearance.       Video Attestation:     I discussed the risks and benefits of a telehealth visit and I obtained the patient's or legally authorized representative's informed verbal consent to conduct this assessment using telehealth tools.? All of the patient’s or legally authorized representative's questions regarding the telehealth interaction were addressed. I provided my name and disclosed to the patient or legally authorized representative my licensure, certification, or registration. ?This consultation was remotely performed at the request of Ifrah Lane MD with the assistance of a trained virtual health liaison working at the originating site.? The consultation used real time licensed and encrypted telehealth equipment which allowed a live video connection between my location and the patient's location.? Aspects of the evaluation that could not be adequately evaluated by virtual assessment were shared with both the patient and the consulting provider.?     A total of 60 minutes of time was spent in direct care and coordination of care with the patient.

## 2024-05-07 NOTE — CARE COORDINATION
BEST     RUR  14 %     IDR  Rounds this am with MD and team.   Work-up in progress -offer MRI- considering at this time.    NESTOR  24 hours     Plan   PCP   Transportation   First IMM completed by Patient Access.  Second IMM Letter   Recovery Resources - [place] [phone]   Patient will continue to follow-up with Community Resources  [facility],          05/07/24 0403   Service Assessment   Patient Orientation Alert and Oriented;Person;Place;Situation   Cognition Alert   History Provided By Patient   Primary Caregiver Self  (Requested help in the home for daughter to become paid caregiver through Medicaid -)   Support Systems Family Members   Patient's Healthcare Decision Maker is: Legal Next of Kin  (Daughter Fallon Crawford)   PCP Verified by CM Yes  (Dr. Femi Barbour.  Appointment was made for 4-25-24. to assist with follow-up)   Last Visit to PCP Within last 3 months   Prior Functional Level Independent in ADLs/IADLs  (patient indicates sometimes he has back pain- and uses a walker that makes it  hard to do all his care-)   Current Functional Level Independent in ADLs/IADLs   Can patient return to prior living arrangement Yes   Ability to make needs known: Good   Family able to assist with home care needs: Yes   Would you like for me to discuss the discharge plan with any other family members/significant others, and if so, who? No   Financial Resources Medicare  (was at [facility] yesterday to complete the updated application- and provide verification - Hospital Financial Counselor met with patient and completed [employer] [place]- need  verification- referral to [facility] to follow the Medicaid process)   Community Resources Other (Comment)  (community resources)   CM/SW Referral Financial   Social/Functional History   Lives With Alone   Type of Home House   Home Equipment Cane;Walker, rolling   Receives Help From Family   ADL Assistance Independent   Homemaking Assistance Needs

## 2024-05-07 NOTE — H&P
Negative NEG mg/dL    Glucose, Ur Negative NEG mg/dL    Ketones, Urine Negative NEG mg/dL    Bilirubin, Urine Negative NEG      Blood, Urine Negative NEG      Urobilinogen, Urine 1.0 0.2 - 1.0 EU/dL    Nitrite, Urine Negative NEG      Leukocyte Esterase, Urine Negative NEG      WBC, UA 0-4 0 - 4 /hpf    RBC, UA 0-5 0 - 5 /hpf    Epithelial Cells UA FEW FEW /lpf    BACTERIA, URINE Negative NEG /hpf    Urine Culture if Indicated CULTURE NOT INDICATED BY UA RESULT CNI     Urine Drug Screen    Collection Time: 05/06/24  7:12 PM   Result Value Ref Range    Amphetamine, Urine Negative NEG      Barbiturates, Urine Negative NEG      Benzodiazepines, Urine Negative NEG      Cocaine, Urine Positive (A) NEG      Methadone, Urine Negative NEG      Opiates, Urine Negative NEG      PCP, Urine Negative NEG      THC, TH-Cannabinol, Urine Negative NEG      Comments: (NOTE)    Troponin    Collection Time: 05/06/24  8:56 PM   Result Value Ref Range    Troponin, High Sensitivity 8 0 - 76 ng/L

## 2024-05-07 NOTE — PROGRESS NOTES
meningeal signs  Chest: Clear to auscultation bilaterally   CVS: S1 S2 heard, Capillary refill less than 2 seconds  Abd: soft/ non tender, non distended, BS physiological,   Ext: no clubbing, no cyanosis, no edema, brisk 2+ DP pulses  Neuro/Psych: pleasant mood and affect, CN 2-12 grossly intact, sensory grossly within normal limit, Strength 5/5 in all extremities, DTR 1+ x 4  Skin: warm     Data Review:   I have personally and independently reviewed all pertinent labs, diagnostic studies, imaging, and have provided independent interpretation of the same.     Labs:     Recent Labs     05/06/24 1830 05/07/24  0427   WBC 8.8 7.8   HGB 14.9 13.5   HCT 43.4 41.6    258     Recent Labs     05/06/24 1830      K 3.6      CO2 28   BUN 14     Recent Labs     05/06/24 1830   ALT 47   GLOB 3.5     Recent Labs     05/06/24 1830   INR 1.1      No results for input(s): \"TIBC\", \"FERR\" in the last 72 hours.    Invalid input(s): \"FE\", \"PSAT\"   No results found for: \"RBCF\"   No results for input(s): \"PH\", \"PCO2\", \"PO2\" in the last 72 hours.  No results for input(s): \"CPK\" in the last 72 hours.    Invalid input(s): \"CPKMB\", \"CKNDX\", \"TROIQ\"  Lab Results   Component Value Date/Time    CHOL 163 04/19/2024 04:43 AM    HDL 55 04/19/2024 04:43 AM    LDL 89.4 04/19/2024 04:43 AM     No results found for: \"GLUCPOC\"  [unfilled]    Notes reviewed from all clinical/nonclinical/nursing services involved in patient's clinical care. Care coordination discussions were held with appropriate clinical/nonclinical/ nursing providers based on care coordination needs.         Patients current active Medications were reviewed, considered, added and adjusted based on the clinical condition today.      Home Medications were reconciled to the best of my ability given all available resources at the time of admission. Route is PO if not otherwise noted.      Admission Status:[id number]:::1}      Medications Reviewed:     Current  Facility-Administered Medications   Medication Dose Route Frequency    sodium chloride flush 0.9 % injection 5-40 mL  5-40 mL IntraVENous 2 times per day    sodium chloride flush 0.9 % injection 5-40 mL  5-40 mL IntraVENous PRN    0.9 % sodium chloride infusion   IntraVENous PRN    ondansetron (ZOFRAN-ODT) disintegrating tablet 4 mg  4 mg Oral Q8H PRN    Or    ondansetron (ZOFRAN) injection 4 mg  4 mg IntraVENous Q6H PRN    polyethylene glycol (GLYCOLAX) packet 17 g  17 g Oral Daily PRN    enoxaparin Sodium (LOVENOX) injection 30 mg  30 mg SubCUTAneous BID    aspirin chewable tablet 81 mg  81 mg Oral Daily    Or    aspirin suppository 300 mg  300 mg Rectal Daily    atorvastatin (LIPITOR) tablet 80 mg  80 mg Oral Nightly    clopidogrel (PLAVIX) tablet 75 mg  75 mg Oral Daily    finasteride (PROSCAR) tablet 5 mg  5 mg Oral Daily    gabapentin (NEURONTIN) capsule 100 mg  100 mg Oral TID    meclizine (ANTIVERT) tablet 25 mg  25 mg Oral TID PRN    tamsulosin (FLOMAX) capsule 0.4 mg  0.4 mg Oral Daily    amLODIPine (NORVASC) tablet 5 mg  5 mg Oral Daily     ______________________________________________________________________  EXPECTED LENGTH OF STAY: 2  ACTUAL LENGTH OF STAY:          1                 Anjum Lane MD

## 2024-05-08 LAB
ANION GAP SERPL CALC-SCNC: 5 MMOL/L (ref 5–15)
BUN SERPL-MCNC: 10 MG/DL (ref 6–20)
BUN/CREAT SERPL: 8 (ref 12–20)
CALCIUM SERPL-MCNC: 8.5 MG/DL (ref 8.5–10.1)
CHLORIDE SERPL-SCNC: 106 MMOL/L (ref 97–108)
CO2 SERPL-SCNC: 30 MMOL/L (ref 21–32)
CREAT SERPL-MCNC: 1.18 MG/DL (ref 0.7–1.3)
GLUCOSE SERPL-MCNC: 106 MG/DL (ref 65–100)
POTASSIUM SERPL-SCNC: 3.3 MMOL/L (ref 3.5–5.1)
SODIUM SERPL-SCNC: 141 MMOL/L (ref 136–145)

## 2024-05-08 PROCEDURE — 6370000000 HC RX 637 (ALT 250 FOR IP): Performed by: INTERNAL MEDICINE

## 2024-05-08 PROCEDURE — 6370000000 HC RX 637 (ALT 250 FOR IP): Performed by: HOSPITALIST

## 2024-05-08 PROCEDURE — 6370000000 HC RX 637 (ALT 250 FOR IP): Performed by: PSYCHIATRY & NEUROLOGY

## 2024-05-08 PROCEDURE — 80048 BASIC METABOLIC PNL TOTAL CA: CPT

## 2024-05-08 PROCEDURE — 1100000003 HC PRIVATE W/ TELEMETRY

## 2024-05-08 PROCEDURE — 94640 AIRWAY INHALATION TREATMENT: CPT

## 2024-05-08 PROCEDURE — 36415 COLL VENOUS BLD VENIPUNCTURE: CPT

## 2024-05-08 PROCEDURE — 6360000002 HC RX W HCPCS: Performed by: HOSPITALIST

## 2024-05-08 PROCEDURE — 2580000003 HC RX 258: Performed by: HOSPITALIST

## 2024-05-08 RX ORDER — BUTALBITAL, ACETAMINOPHEN AND CAFFEINE 50; 325; 40 MG/1; MG/1; MG/1
1 TABLET ORAL EVERY 4 HOURS PRN
Qty: 10 TABLET | Refills: 0 | Status: SHIPPED | OUTPATIENT
Start: 2024-05-08

## 2024-05-08 RX ORDER — POTASSIUM CHLORIDE 750 MG/1
40 TABLET, FILM COATED, EXTENDED RELEASE ORAL ONCE
Status: COMPLETED | OUTPATIENT
Start: 2024-05-08 | End: 2024-05-08

## 2024-05-08 RX ORDER — MECLIZINE HYDROCHLORIDE 25 MG/1
25 TABLET ORAL 3 TIMES DAILY PRN
Qty: 15 TABLET | Refills: 0 | Status: SHIPPED | OUTPATIENT
Start: 2024-05-08

## 2024-05-08 RX ORDER — ONDANSETRON 4 MG/1
4 TABLET, ORALLY DISINTEGRATING ORAL EVERY 8 HOURS PRN
Qty: 10 TABLET | Refills: 0 | Status: SHIPPED | OUTPATIENT
Start: 2024-05-08

## 2024-05-08 RX ADMIN — SODIUM CHLORIDE, PRESERVATIVE FREE 10 ML: 5 INJECTION INTRAVENOUS at 20:37

## 2024-05-08 RX ADMIN — POTASSIUM CHLORIDE 40 MEQ: 750 TABLET, EXTENDED RELEASE ORAL at 10:39

## 2024-05-08 RX ADMIN — GABAPENTIN 100 MG: 100 CAPSULE ORAL at 20:36

## 2024-05-08 RX ADMIN — IPRATROPIUM BROMIDE AND ALBUTEROL SULFATE 1 DOSE: 2.5; .5 SOLUTION RESPIRATORY (INHALATION) at 08:05

## 2024-05-08 RX ADMIN — ATORVASTATIN CALCIUM 80 MG: 40 TABLET, FILM COATED ORAL at 20:36

## 2024-05-08 RX ADMIN — SODIUM CHLORIDE, PRESERVATIVE FREE 10 ML: 5 INJECTION INTRAVENOUS at 09:27

## 2024-05-08 RX ADMIN — TAMSULOSIN HYDROCHLORIDE 0.4 MG: 0.4 CAPSULE ORAL at 09:25

## 2024-05-08 RX ADMIN — ENOXAPARIN SODIUM 30 MG: 100 INJECTION SUBCUTANEOUS at 20:39

## 2024-05-08 RX ADMIN — BUTALBITAL, ACETAMINOPHEN AND CAFFEINE 1 TABLET: 325; 50; 40 TABLET ORAL at 09:26

## 2024-05-08 RX ADMIN — FINASTERIDE 5 MG: 5 TABLET, FILM COATED ORAL at 09:26

## 2024-05-08 RX ADMIN — MECLIZINE 25 MG: 12.5 TABLET ORAL at 09:26

## 2024-05-08 RX ADMIN — GABAPENTIN 100 MG: 100 CAPSULE ORAL at 13:50

## 2024-05-08 RX ADMIN — GABAPENTIN 100 MG: 100 CAPSULE ORAL at 09:25

## 2024-05-08 RX ADMIN — CLOPIDOGREL BISULFATE 75 MG: 75 TABLET, FILM COATED ORAL at 09:25

## 2024-05-08 RX ADMIN — ASPIRIN 325 MG: 325 TABLET ORAL at 09:26

## 2024-05-08 RX ADMIN — AMLODIPINE BESYLATE 5 MG: 5 TABLET ORAL at 09:27

## 2024-05-08 RX ADMIN — ENOXAPARIN SODIUM 30 MG: 100 INJECTION SUBCUTANEOUS at 09:27

## 2024-05-08 ASSESSMENT — PAIN DESCRIPTION - DESCRIPTORS: DESCRIPTORS: ACHING

## 2024-05-08 ASSESSMENT — PAIN SCALES - GENERAL
PAINLEVEL_OUTOF10: 6
PAINLEVEL_OUTOF10: 0
PAINLEVEL_OUTOF10: 0

## 2024-05-08 ASSESSMENT — PAIN DESCRIPTION - LOCATION: LOCATION: HEAD

## 2024-05-08 NOTE — DISCHARGE SUMMARY
Discharge Summary   Please note that this dictation was completed with [employer], the computer voice recognition software.  Quite often unanticipated grammatical, syntax, homophones, and other interpretive errors are inadvertently transcribed by the computer software.  Please disregard these errors.  Please excuse any errors that have escaped final proofreading.    PATIENT ID: Bruce Crawford  MRN: [id number]   YOB: 1955    DATE OF ADMISSION: 5/6/2024  6:17 PM    DATE OF DISCHARGE: 5/8/2024  PRIMARY CARE PROVIDER: Femi Barbour MD         ATTENDING PHYSICIAN: Anjum Lane MD  DISCHARGING PROVIDER: Anjum Lane MD       CONSULTATIONS: IP CONSULT TO TELE-NEUROLOGY  IP CONSULT TO NEUROLOGY    PROCEDURES/SURGERIES: * No surgery found *    ADMITTING HPI from excerpted H&P   CHIEF COMPLAINT:  Dizziness     Pt came in stated he felt like he is having another stroke. He was asked when he had a stroke he stated last week. Pt is argumentative with staff including MD stating we should just know his symptoms. Pt refused IV in A/C. Pt refused 20 gauge in his hand or anything greater than a 22 gauge in his hand. It was explained if we have imaging to do we need one in the A/C. Pt refusing labs at this time, stating MD still at bedside let her finish seeing me.Pt states he is on blood thinners. Pt also states he is nauseous with movement and endorses SOB when laying flat. Pt appears in NAD at this time.        HPI: Bruce Crawford, 69 y.o. male presents to the ED with cc of dizziness.  He states he began to have dizziness and feeling off balance around 11 AM.  This was accompanied by a mild headache and nausea and generalized weakness.  He reports some associated diffuse bilateral blurry vision.  He denies any speech changes.  He states that his right upper extremity feels weak and a bit numb.  He says that this has happened multiple times previously in setting of concern for stroke.  He denies any chest  following test results are still pending:     FOLLOW UP APPOINTMENTS:    [unfilled]     ADDITIONAL CARE RECOMMENDATIONS:     DIET: diabetic diet and low fat, low cholesterol diet low-sodium    ACTIVITY: activity as tolerated    WOUND CARE:     EQUIPMENT needed:       DISCHARGE MEDICATIONS:  Please see discharge medication list        NOTIFY YOUR PHYSICIAN FOR ANY OF THE FOLLOWING:   Fever over 101 degrees for 24 hours.   Chest pain, shortness of breath, fever, chills, nausea, vomiting, diarrhea, change in mentation, falling, weakness, bleeding. Severe pain or pain not relieved by medications.  Or, any other signs or symptoms that you may have questions about.    DISPOSITION:   x Home    OT  PT  HH  RN       Long term SNF/Inpatient Rehab    Independent/assisted living    Hospice    Other:       PATIENT CONDITION AT DISCHARGE:     Functional status    Poor     Deconditioned    x Independent      Cognition    x Lucid     Forgetful     Dementia      Catheters/lines (plus indication)    Mancera     PICC     PEG    x None      Code status    x Full code     DNR      PHYSICAL EXAMINATION AT DISCHARGE:    General : alert x 3, awake, no acute distress,   HEENT: PEERL, EOMI, moist mucus membrane, TM clear  Neck: supple, no JVD, no meningeal signs  Chest: Clear to auscultation bilaterally   CVS: S1 S2 heard, Capillary refill less than 2 seconds  Abd: soft/ Non tender, non distended, BS physiological,   Ext: no clubbing, no cyanosis, no edema, brisk 2+ DP pulses  Neuro/Psych: pleasant mood and affect, CN 2-12 grossly intact, sensory grossly within normal limit, Strength 5/5 in all extremities, DTR 1+ x 4  Skin: warm     CHRONIC MEDICAL DIAGNOSES:      Greater than 31 minutes were spent with the patient on counseling and coordination of care    Signed:   Anjum Lane MD  5/8/2024  7:23 AM

## 2024-05-08 NOTE — DISCHARGE INSTRUCTIONS
It is important that you take the medication exactly as they are prescribed.   Keep your medication in the bottles provided by the pharmacist and keep a list of the medication names, dosages, and times to be taken in your wallet.   Do not take other medications without consulting your doctor.       NOTIFY YOUR PHYSICIAN OR GO TO THE NEAREST EMERGENCY ROOM FOR ANY OF THE FOLLOWING:   Fever over 101 degrees for 24 hours.   Chest pain, shortness of breath, fever, chills, nausea, vomiting, diarrhea, change in mentation, falling, weakness, bleeding. Severe pain or pain not relieved by medications.  Or, any other signs or symptoms that you may have questions about.    Note: You were admitted with dizziness and was evaluated by neurologist.  You need to follow-up with primary care physician for continued healthcare management/screenings.  You were found to be prediabetic and will need to discuss with primary care physician for continued monitoring and further evaluation.

## 2024-05-08 NOTE — PROGRESS NOTES
Comprehensive Nutrition Assessment     Type and Reason for Visit: Initial, Consult     Nutrition Recommendations/Plan:   Consider CC diet with 5 CHO choices per tray.  Pt's A1c is 5.7 and he is obese  Enc fluid intake         Malnutrition Assessment:  Malnutrition Status:  No malnutrition (05/08/24 1334)              Nutrition Assessment:    Pt admitted with dizziness and HA, pt hypertensive in ED, states he may have had a stroke in 7/23 but refused MRI which he is still refusing; admitted again in April 2024; againbelieves he had a stroke.  Refusing IV and other care.  Hx notable for HTN, pre-diabetes, CKD, COPD, substance abuse     Nutrition Related Findings:    Pt tolerating diet Wound Type: None              Lab Results   Component Value Date/Time      04/18/2024 02:24 PM     K 3.7 04/18/2024 02:24 PM      04/18/2024 02:24 PM     CO2 30 04/18/2024 02:24 PM     BUN 17 04/18/2024 02:24 PM     CREATININE 1.33 04/18/2024 02:24 PM     GLUCOSE 142 04/18/2024 02:24 PM     CALCIUM 9.6 04/18/2024 02:24 PM     PHOS 4.3 07/18/2023 06:58 AM     MG 1.9 07/18/2023 06:58 AM            Estimated Daily Nutrient Needs:  Energy Requirements Based On: Kcal/kg  Weight Used for Energy Requirements: Adjusted  Energy (kcal/day): 2385  Weight Used for Protein Requirements: Adjusted  Protein (g/day): 105  Method Used for Fluid Requirements: Other (Comment)  Fluid (ml/day): 3000     Nutrition Related Findings:   Edema: None      Last BM: 05/07/24     Wounds:   Wound Type: None        Current Nutrition Intake & Therapies:    Average Meal Intake: %  Average Supplements Intake: None Ordered  ADULT DIET; Regular  Meal Intake:   Patient Vitals for the past 168 hrs:    PO Meals Eaten (%)   05/08/24 0912 76 - 100%      Supplement Intake:  No data found.  Nutrition Support: none        Anthropometric Measures:  Height: 181.6 cm (5' 11.5\")  Ideal Body Weight (IBW): 175 lbs (80 kg)    Current Body Weight: 111.1 kg (249 lb 9.6 oz),  142 % IBW.    Current BMI (kg/m2): 34.33 Weight Adjustment For:  (ABW 95.4 kg)  BMI Categories: Obese Class 1 (BMI 30.0-34.9)         Wt Readings from Last 10 Encounters:   04/18/24 111.1 kg (245 lb)   07/21/23 118.8 kg (261 lb 12.8 oz)   07/18/23 115.4 kg (254 lb 6.4 oz)   07/12/22 124.7 kg (275 lb)   04/14/21 115.2 kg (254 lb)               Nutrition Diagnosis:   Limited adherence to nutrition-related recommendations, Overweight/Obese related to excessive energy intake as evidenced by       Nutrition Interventions:   Food and/or Nutrient Delivery: Continue Current Diet  Nutrition Education/Counseling: No recommendation at this time  Coordination of Nutrition Care: No recommendation at this time     Goals:  Previous Goal Met: Progressing toward Goal(s)  Goals: Meet at least 75% of estimated needs, prior to discharge     Nutrition Monitoring and Evaluation:   Behavioral-Environmental Outcomes: Readiness for Change  Food/Nutrient Intake Outcomes: Food and Nutrient Intake  Physical Signs/Symptoms Outcomes: Meal Time Behavior, Weight     Discharge Planning:    Too soon to determine      Karthikeyan Britton, RD  [phone]

## 2024-05-08 NOTE — PLAN OF CARE
Problem: Pain  Goal: Verbalizes/displays adequate comfort level or baseline comfort level  Outcome: Progressing     Problem: Chronic Conditions and Co-morbidities  Goal: Patient's chronic conditions and co-morbidity symptoms are monitored and maintained or improved  Outcome: Progressing     Problem: Neurosensory - Adult  Goal: Achieves stable or improved neurological status  Outcome: Progressing  Flowsheets (Taken 5/8/2024 8883)  Achieves stable or improved neurological status: Assess for and report changes in neurological status

## 2024-05-08 NOTE — PROGRESS NOTES
Physician Progress Note      PATIENT:               JENNIFER SHIPMAN  [facility] #:                  [id number]  :                       1955  ADMIT DATE:       2024 6:17 PM  DISCH DATE:  RESPONDING  PROVIDER #:        Ifrah Lane MD          QUERY TEXT:    Pt admitted with Dizziness. If possible, please document in progress notes and   discharge summary the etiology.    The medical record reflects the following:  Risk Factors:    Clinical Indicators:  CTH: No evidence of acute intracranial abnormality. No interval change in   appearance.  Pt refused MRI    Treatment: Meclizine  -pt refused IVFs    Please call if you have any questions or need assistance. I can also be   reached via [employer] or [employer] # [phone].  Thank you,  Judith Diaz, RN/CDI,  Options provided:  -- Dizziness due to, please specify etiology.  -- Other - I will add my own diagnosis  -- Disagree - Not applicable / Not valid  -- Disagree - Clinically unable to determine / Unknown  -- Refer to Clinical Documentation Reviewer    PROVIDER RESPONSE TEXT:    Provider is clinically unable to determine a response to this query.    Query created by: Judith Diaz on 2024 1:44 PM      Electronically signed by:  Ifrah Lane MD 2024 5:25 PM

## 2024-05-08 NOTE — PROGRESS NOTES
Interdisciplinary team rounds were held 5/8/2024 with the following team members:Care Management, Nursing, Pastoral Care, Pharmacy, Physical Therapy, and Physician and the patient.    Plan of care discussed. See clinical pathway and/or care plan for interventions and desired outcomes.    Discussed pt not wanting to be discharged. Pt became upset at talk of discharge and stated that he would like to appeal.

## 2024-05-08 NOTE — CARE COORDINATION
BEST  RUR: 14%    CM met with patient at bedside. As per MD patient to discharge today. Patient does not agree with discharge plan and wants to appeal discharge. CM discussed 2nd IMM letter with patient. Patient signed letter. Copy given to patient, on chart and updated on document list.     Patient mentions he will not call anyone to appeal his discharge because he did not have to the previous admission. States he wants to speak to CM supervisor.     CM supervisor, Kylee Dejesus accompanied CM to patient room. Supervisor spoke to patient that he must be the one to call to appeal his discharge.     Patient agrees to call.     ROSENDO MorenoN, RN,   [phone]

## 2024-05-08 NOTE — PROGRESS NOTES
Pharmacist Discharge Medication Reconciliation    Discharging Provider: Dr. Ifrah Lane    Significant PMH:   Past Medical History:   Diagnosis Date    Frequent headaches     Hypertension     Muscle pain     Neuropathy     Visual disturbance      Chief Complaint for this Admission:   Chief Complaint   Patient presents with    Dizziness     Allergies: Patient has no known allergies.    Discharge Medications:   Current Discharge Medication List        START taking these medications    Details   butalbital-acetaminophen-caffeine (FIORICET, ESGIC) -40 MG per tablet Take 1 tablet by mouth every 4 hours as needed for Headaches  Qty: 10 tablet, Refills: 0      ondansetron (ZOFRAN-ODT) 4 MG disintegrating tablet Take 1 tablet by mouth every 8 hours as needed for Nausea or Vomiting  Qty: 10 tablet, Refills: 0           CONTINUE these medications which have CHANGED    Details   meclizine (ANTIVERT) 25 MG tablet Take 1 tablet by mouth 3 times daily as needed for Dizziness  Qty: 15 tablet, Refills: 0           CONTINUE these medications which have NOT CHANGED    Details   fluticasone-umeclidin-vilant (TRELEGY ELLIPTA) 100-62.5-25 MCG/ACT AEPB inhaler Inhale 1 puff into the lungs as needed.      aspirin 325 MG tablet Take 1 tablet by mouth daily  Qty: 30 tablet, Refills: 0      gabapentin (NEURONTIN) 100 MG capsule Take 1 capsule by mouth 3 times daily for 30 days. Max Daily Amount: 300 mg  Qty: 90 capsule, Refills: 0    Associated Diagnoses: Sciatica of right side      atorvastatin (LIPITOR) 80 MG tablet Take 1 tablet by mouth nightly  Qty: 30 tablet, Refills: 0      amLODIPine (NORVASC) 5 MG tablet Take 1 tablet by mouth daily  Qty: 30 tablet, Refills: 0      finasteride (PROSCAR) 5 MG tablet Take 1 tablet by mouth daily  Qty: 30 tablet, Refills: 0      tamsulosin (FLOMAX) 0.4 MG capsule Take 1 capsule by mouth daily  Qty: 30 capsule, Refills: 0      clopidogrel (PLAVIX) 75 MG tablet Take 1 tablet by mouth daily  Qty:

## 2024-05-08 NOTE — PROGRESS NOTES
1925: Verbal change of shift report received from AAYUSH Patel.     2040: Assessment and VS completed. Pt reported HA 4/10. PM meds administered per order, including PRN Fioricet for HA and Meclizine for dizziness, per pt request. Pt given ice cream and soda as requested. Pt resting comfortably in bed, watching TV and eating snack.    2215: Pt called out requesting breathing treatment. RT notified.    2315: Magnesium infusion complete. IV flushed and saline locked/ capped. Pt requesting more snacks.     0720: Verbal change of shift report given at bedside to AAYUSH Hu.

## 2024-05-08 NOTE — PROGRESS NOTES
[facility]                                                                               Hospitalist Progress Note  Anjum Lane MD          Date of Service:  2024  NAME:  Bruce Crawford  :  1955  MRN:  [id number]    Please note that this dictation was completed with [employer], the computer voice recognition software.  Quite often unanticipated grammatical, syntax, homophones, and other interpretive errors are inadvertently transcribed by the computer software.  Please disregard these errors.  Please excuse any errors that have escaped final proofreading.    Admission Summary:   CHIEF COMPLAINT:  Dizziness     Pt came in stated he felt like he is having another stroke. He was asked when he had a stroke he stated last week. Pt is argumentative with staff including MD stating we should just know his symptoms. Pt refused IV in A/C. Pt refused 20 gauge in his hand or anything greater than a 22 gauge in his hand. It was explained if we have imaging to do we need one in the A/C. Pt refusing labs at this time, stating MD still at bedside let her finish seeing me.Pt states he is on blood thinners. Pt also states he is nauseous with movement and endorses SOB when laying flat. Pt appears in NAD at this time.        HPI: Bruce Crawford, 69 y.o. male presents to the ED with cc of dizziness.  He states he began to have dizziness and feeling off balance around 11 AM.  This was accompanied by a mild headache and nausea and generalized weakness.  He reports some associated diffuse bilateral blurry vision.  He denies any speech changes.  He states that his right upper extremity feels weak and a bit numb.  He says that this has happened multiple times previously in setting of concern for stroke.  He denies any chest pain, denies any acute shortness of breath.  He takes Plavix, no thinners.     reviewed his discharge summary from 2024, he was admitted for stroke evaluation

## 2024-05-09 PROCEDURE — 2580000003 HC RX 258: Performed by: HOSPITALIST

## 2024-05-09 PROCEDURE — 94640 AIRWAY INHALATION TREATMENT: CPT

## 2024-05-09 PROCEDURE — 6370000000 HC RX 637 (ALT 250 FOR IP): Performed by: HOSPITALIST

## 2024-05-09 PROCEDURE — 1100000003 HC PRIVATE W/ TELEMETRY

## 2024-05-09 PROCEDURE — 6360000002 HC RX W HCPCS: Performed by: HOSPITALIST

## 2024-05-09 PROCEDURE — 6370000000 HC RX 637 (ALT 250 FOR IP): Performed by: INTERNAL MEDICINE

## 2024-05-09 RX ORDER — FLUTICASONE FUROATE, UMECLIDINIUM BROMIDE AND VILANTEROL TRIFENATATE 100; 62.5; 25 UG/1; UG/1; UG/1
1 POWDER RESPIRATORY (INHALATION) DAILY
Qty: 1 EACH | Refills: 0 | Status: SHIPPED | OUTPATIENT
Start: 2024-05-09

## 2024-05-09 RX ORDER — ASPIRIN 325 MG
325 TABLET ORAL DAILY
Qty: 30 TABLET | Refills: 0 | Status: SHIPPED | OUTPATIENT
Start: 2024-05-09

## 2024-05-09 RX ORDER — AMLODIPINE BESYLATE 5 MG/1
5 TABLET ORAL DAILY
Qty: 30 TABLET | Refills: 0 | Status: SHIPPED | OUTPATIENT
Start: 2024-05-09

## 2024-05-09 RX ORDER — TAMSULOSIN HYDROCHLORIDE 0.4 MG/1
0.4 CAPSULE ORAL DAILY
Qty: 30 CAPSULE | Refills: 0 | Status: SHIPPED | OUTPATIENT
Start: 2024-05-09

## 2024-05-09 RX ORDER — CLOPIDOGREL BISULFATE 75 MG/1
75 TABLET ORAL DAILY
Qty: 30 TABLET | Refills: 0 | Status: SHIPPED | OUTPATIENT
Start: 2024-05-09

## 2024-05-09 RX ORDER — FINASTERIDE 5 MG/1
5 TABLET, FILM COATED ORAL DAILY
Qty: 30 TABLET | Refills: 0 | Status: SHIPPED | OUTPATIENT
Start: 2024-05-09

## 2024-05-09 RX ORDER — ATORVASTATIN CALCIUM 80 MG/1
80 TABLET, FILM COATED ORAL NIGHTLY
Qty: 30 TABLET | Refills: 0 | Status: SHIPPED | OUTPATIENT
Start: 2024-05-09

## 2024-05-09 RX ORDER — GABAPENTIN 100 MG/1
100 CAPSULE ORAL 3 TIMES DAILY
Qty: 90 CAPSULE | Refills: 0 | Status: SHIPPED | OUTPATIENT
Start: 2024-05-09 | End: 2024-06-08

## 2024-05-09 RX ADMIN — ENOXAPARIN SODIUM 30 MG: 100 INJECTION SUBCUTANEOUS at 08:53

## 2024-05-09 RX ADMIN — ENOXAPARIN SODIUM 30 MG: 100 INJECTION SUBCUTANEOUS at 21:46

## 2024-05-09 RX ADMIN — GABAPENTIN 100 MG: 100 CAPSULE ORAL at 08:52

## 2024-05-09 RX ADMIN — MECLIZINE 25 MG: 12.5 TABLET ORAL at 21:46

## 2024-05-09 RX ADMIN — FINASTERIDE 5 MG: 5 TABLET, FILM COATED ORAL at 08:52

## 2024-05-09 RX ADMIN — ASPIRIN 325 MG: 325 TABLET ORAL at 08:52

## 2024-05-09 RX ADMIN — SODIUM CHLORIDE, PRESERVATIVE FREE 10 ML: 5 INJECTION INTRAVENOUS at 21:47

## 2024-05-09 RX ADMIN — IPRATROPIUM BROMIDE AND ALBUTEROL SULFATE 1 DOSE: 2.5; .5 SOLUTION RESPIRATORY (INHALATION) at 11:16

## 2024-05-09 RX ADMIN — MECLIZINE 25 MG: 12.5 TABLET ORAL at 08:52

## 2024-05-09 RX ADMIN — GABAPENTIN 100 MG: 100 CAPSULE ORAL at 21:46

## 2024-05-09 RX ADMIN — ATORVASTATIN CALCIUM 80 MG: 40 TABLET, FILM COATED ORAL at 21:46

## 2024-05-09 RX ADMIN — SODIUM CHLORIDE, PRESERVATIVE FREE 10 ML: 5 INJECTION INTRAVENOUS at 08:53

## 2024-05-09 RX ADMIN — GABAPENTIN 100 MG: 100 CAPSULE ORAL at 14:46

## 2024-05-09 RX ADMIN — CLOPIDOGREL BISULFATE 75 MG: 75 TABLET, FILM COATED ORAL at 08:52

## 2024-05-09 RX ADMIN — AMLODIPINE BESYLATE 5 MG: 5 TABLET ORAL at 08:52

## 2024-05-09 RX ADMIN — TAMSULOSIN HYDROCHLORIDE 0.4 MG: 0.4 CAPSULE ORAL at 08:52

## 2024-05-09 ASSESSMENT — PAIN SCALES - GENERAL: PAINLEVEL_OUTOF10: 0

## 2024-05-09 NOTE — PROGRESS NOTES
[facility]                                                                               Hospitalist Progress Note  Anjum Lane MD          Date of Service:  2024  NAME:  Bruce Crawford  :  1955  MRN:  [id number]    Please note that this dictation was completed with [employer], the computer voice recognition software.  Quite often unanticipated grammatical, syntax, homophones, and other interpretive errors are inadvertently transcribed by the computer software.  Please disregard these errors.  Please excuse any errors that have escaped final proofreading.    Admission Summary:   CHIEF COMPLAINT:  Dizziness     Pt came in stated he felt like he is having another stroke. He was asked when he had a stroke he stated last week. Pt is argumentative with staff including MD stating we should just know his symptoms. Pt refused IV in A/C. Pt refused 20 gauge in his hand or anything greater than a 22 gauge in his hand. It was explained if we have imaging to do we need one in the A/C. Pt refusing labs at this time, stating MD still at bedside let her finish seeing me.Pt states he is on blood thinners. Pt also states he is nauseous with movement and endorses SOB when laying flat. Pt appears in NAD at this time.        HPI: Bruce Crawford, 69 y.o. male presents to the ED with cc of dizziness.  He states he began to have dizziness and feeling off balance around 11 AM.  This was accompanied by a mild headache and nausea and generalized weakness.  He reports some associated diffuse bilateral blurry vision.  He denies any speech changes.  He states that his right upper extremity feels weak and a bit numb.  He says that this has happened multiple times previously in setting of concern for stroke.  He denies any chest pain, denies any acute shortness of breath.  He takes Plavix, no thinners.     reviewed his discharge summary from 2024, he was admitted for stroke evaluation  \"FERR\" in the last 72 hours.    Invalid input(s): \"FE\", \"PSAT\"   No results found for: \"RBCF\"   No results for input(s): \"PH\", \"PCO2\", \"PO2\" in the last 72 hours.  No results for input(s): \"CPK\" in the last 72 hours.    Invalid input(s): \"CPKMB\", \"CKNDX\", \"TROIQ\"  Lab Results   Component Value Date/Time    CHOL 108 05/07/2024 04:27 AM    HDL 54 05/07/2024 04:27 AM    LDL 43.4 05/07/2024 04:27 AM    LDL 89.4 04/19/2024 04:43 AM     No results found for: \"GLUCPOC\"  [unfilled]    Notes reviewed from all clinical/nonclinical/nursing services involved in patient's clinical care. Care coordination discussions were held with appropriate clinical/nonclinical/ nursing providers based on care coordination needs.         Patients current active Medications were reviewed, considered, added and adjusted based on the clinical condition today.      Home Medications were reconciled to the best of my ability given all available resources at the time of admission. Route is PO if not otherwise noted.      Admission Status:[id number]:::1}      Medications Reviewed:     Current Facility-Administered Medications   Medication Dose Route Frequency    ipratropium 0.5 mg-albuterol 2.5 mg (DUONEB) nebulizer solution 1 Dose  1 Dose Inhalation Q4H PRN    aspirin tablet 325 mg  325 mg Oral Daily    butalbital-acetaminophen-caffeine (FIORICET, ESGIC) per tablet 1 tablet  1 tablet Oral Q4H PRN    prochlorperazine (COMPAZINE) injection 10 mg  10 mg IntraVENous Q6H PRN    sodium chloride flush 0.9 % injection 5-40 mL  5-40 mL IntraVENous 2 times per day    sodium chloride flush 0.9 % injection 5-40 mL  5-40 mL IntraVENous PRN    0.9 % sodium chloride infusion   IntraVENous PRN    ondansetron (ZOFRAN-ODT) disintegrating tablet 4 mg  4 mg Oral Q8H PRN    Or    ondansetron (ZOFRAN) injection 4 mg  4 mg IntraVENous Q6H PRN    polyethylene glycol (GLYCOLAX) packet 17 g  17 g Oral Daily PRN    enoxaparin Sodium (LOVENOX) injection 30 mg  30 mg SubCUTAneous BID

## 2024-05-09 NOTE — CARE COORDINATION
CM went w/  (Kylee) to meet w/ pt in his room. CM presented pt w/ copy of HINN12 Letter (Noncovered Continued Stay) for him to sign. 2nd copy will be added to bedside chart.     CM asked pt what his plan was for transportation home at discharge tomorrow. Pt asserted, \"You can walk me to the pharmacy and set up a ride for me from there.\" CM stated to pt that last time pt was here, she ordered a Lyft for him, and he walked home. Pt stated he didn't care whether CM gives him a ride or not.     CM will check in w/ RN team in the morning to verify if pt needs transportation.     Nupur Mcginnis, NINA, CM

## 2024-05-09 NOTE — CARE COORDINATION
BEST     IDR Rounds again this am with MD and team.   NESTOR once decision from Yasmani.     Patient has been asking for next steps.   Written note  given to pateint- [facility] will call him.  He can call [facility] back for questions.  He has the phone # and ID Case #.     We would follow-up once we received decision- Normally they call the patient first then fax notification to us.     Addendum   Fax received from [facility]   Final Determination 12:48 PM. Control ID  [id number]  MBI/NEIL [id number].     Viktor Met with patient to get patient to sign PINO so they can follow-up on his behalf with the Department of DSS.  See CM previous Note- Patient met with [facility] and updated his application- may need some additional verification.  Waiting to see if he will qualify for Full Benefits or limited coverage.     Asked MD to have his medications sent to pharmacy today so they can be ready for patient  today-     To assist with transportation as needed.     To confirm phone # for patient- so [facility] can reach out again to patient for any other resources especially with Bills.  He indicated to me he has several phones.  He informed me he still having some difficulty with getting the final paperwork from [facility] regarding the approval for PIP.  He did indicates the Manager at [employer] said he see the approval in their system and has set him with a payment plan of $100.00 per month. He paid this in April 29 and next one due May 24, 2024.  Still trying to work on getting help for Water Bill.    Kylee KURTZ RN   [phone]

## 2024-05-09 NOTE — PLAN OF CARE
Problem: Pain  Goal: Verbalizes/displays adequate comfort level or baseline comfort level  5/8/2024 1243 by Mayte Cochran, RN  Outcome: Progressing     Problem: Neurosensory - Adult  Goal: Achieves stable or improved neurological status  5/9/2024 0049 by Caitlyn Tenorio, RN  Outcome: Progressing  5/8/2024 1243 by Mayte Cochran, RN  Outcome: Progressing  Flowsheets (Taken 5/8/2024 0732)  Achieves stable or improved neurological status: Assess for and report changes in neurological status

## 2024-05-09 NOTE — PROGRESS NOTES
0485 -- Bedside shift change report given to LIVIER Norman (oncoming nurse) by AAYUSH Brady (offgoing nurse). Report included the following information Nurse Handoff Report and Event Log. Pt was discharged 5/8 however has appealed his discharge. Waiting to hear decision.     1364 -- Pt asking if we have heard decision about appeal, advised we have not but will let him know when we do. Pt concerned that tele lead came off during the night, informed pt he was still being monitored.     0800 -- Tele d/c per MD.     1340 -- Advised per CM appeal was denied, pt has until 1159 on 5/10 and will then need to leave. Writer in pt's room to advise, pt currently in the shower.     8116 -- Pt advised that meds will be sent to pharmacy and everything will be ready for discharge tomorrow.     1605 -- Notified per CM that pharmacy unable to fill scripts as pt just had meds filled/picked up from [facility] outpatient pharmacy on 4/23.

## 2024-05-10 VITALS
TEMPERATURE: 98.2 F | OXYGEN SATURATION: 97 % | HEART RATE: 76 BPM | SYSTOLIC BLOOD PRESSURE: 147 MMHG | HEIGHT: 72 IN | DIASTOLIC BLOOD PRESSURE: 93 MMHG | WEIGHT: 249.6 LBS | RESPIRATION RATE: 18 BRPM | BODY MASS INDEX: 33.81 KG/M2

## 2024-05-10 NOTE — PROGRESS NOTES
0835) Patient refused Vital Sign assessment as well as head to toe assessment by nurse.  Patient removed his own IV and stated that he was leaving.  He refused to sign the AMA form.  He walked out on his own.

## 2024-05-10 NOTE — PLAN OF CARE
Problem: Pain  Goal: Verbalizes/displays adequate comfort level or baseline comfort level  5/10/2024 0045 by Caitlyn Tenorio RN  Outcome: Progressing  5/9/2024 1439 by Tanisha Davies LPN  Outcome: Progressing

## 2024-05-24 ENCOUNTER — FOLLOWUP TELEPHONE ENCOUNTER (OUTPATIENT)
Facility: HOSPITAL | Age: 69
End: 2024-05-24

## 2024-05-24 NOTE — TELEPHONE ENCOUNTER
CM called patient by telephone to perform post discharge assessment and for the purpose of follow up call from inpatient discharge to check on environmental challenges/medications/appointment follow up/and questions/concerns.     Pt did not answer the phone. CM called both phone numbers listed on pt's facesheet. CM left a HIPAA compliant voicemail on both phones and provided call back number.     CM will attempt a second call.    Samantha Camara LMSW,   [phone]

## 2024-05-28 ENCOUNTER — FOLLOWUP TELEPHONE ENCOUNTER (OUTPATIENT)
Facility: HOSPITAL | Age: 69
End: 2024-05-28

## 2024-05-28 NOTE — TELEPHONE ENCOUNTER
CM called patient by telephone to perform post discharge assessment and for the purpose of follow up call from inpatient discharge to check on environmental challenges/medications/appointment follow up/and questions/concerns.     Pt did not answer the phone. CM left a HIPAA compliant VM and provided call back number. CM to close case and will reopen upon further contact from pt.    Samantha Camara LMSW,   [phone]

## 2024-06-10 ENCOUNTER — HOSPITAL ENCOUNTER (EMERGENCY)
Facility: HOSPITAL | Age: 69
Discharge: HOME | End: 2024-06-10
Payer: MEDICARE

## 2024-06-10 VITALS
HEART RATE: 74 BPM | SYSTOLIC BLOOD PRESSURE: 150 MMHG | OXYGEN SATURATION: 99 % | BODY MASS INDEX: 33.8 KG/M2 | HEIGHT: 73 IN | RESPIRATION RATE: 20 BRPM | WEIGHT: 255 LBS | TEMPERATURE: 98.3 F | DIASTOLIC BLOOD PRESSURE: 85 MMHG

## 2024-06-10 DIAGNOSIS — G89.29 ACUTE EXACERBATION OF CHRONIC LOW BACK PAIN: Primary | ICD-10-CM

## 2024-06-10 DIAGNOSIS — M54.50 ACUTE EXACERBATION OF CHRONIC LOW BACK PAIN: Primary | ICD-10-CM

## 2024-06-10 PROCEDURE — 6370000000 HC RX 637 (ALT 250 FOR IP)

## 2024-06-10 PROCEDURE — 99284 EMERGENCY DEPT VISIT MOD MDM: CPT

## 2024-06-10 PROCEDURE — 6360000002 HC RX W HCPCS

## 2024-06-10 PROCEDURE — 96372 THER/PROPH/DIAG INJ SC/IM: CPT

## 2024-06-10 RX ORDER — PREDNISONE 20 MG/1
20 TABLET ORAL DAILY
Qty: 3 TABLET | Refills: 0 | Status: SHIPPED | OUTPATIENT
Start: 2024-06-10 | End: 2024-06-13

## 2024-06-10 RX ORDER — LIDOCAINE 4 G/G
1 PATCH TOPICAL
Status: DISCONTINUED | OUTPATIENT
Start: 2024-06-10 | End: 2024-06-10 | Stop reason: HOSPADM

## 2024-06-10 RX ORDER — KETOROLAC TROMETHAMINE 30 MG/ML
30 INJECTION, SOLUTION INTRAMUSCULAR; INTRAVENOUS ONCE
Status: COMPLETED | OUTPATIENT
Start: 2024-06-10 | End: 2024-06-10

## 2024-06-10 RX ORDER — GABAPENTIN 100 MG/1
200 CAPSULE ORAL 2 TIMES DAILY
Qty: 20 CAPSULE | Refills: 0 | Status: SHIPPED | OUTPATIENT
Start: 2024-06-10 | End: 2024-06-15

## 2024-06-10 RX ORDER — GABAPENTIN 300 MG/1
300 CAPSULE ORAL
Status: COMPLETED | OUTPATIENT
Start: 2024-06-10 | End: 2024-06-10

## 2024-06-10 RX ORDER — ACETAMINOPHEN 500 MG
1000 TABLET ORAL
Status: COMPLETED | OUTPATIENT
Start: 2024-06-10 | End: 2024-06-10

## 2024-06-10 RX ADMIN — ACETAMINOPHEN 1000 MG: 500 TABLET ORAL at 17:39

## 2024-06-10 RX ADMIN — GABAPENTIN 300 MG: 300 CAPSULE ORAL at 17:39

## 2024-06-10 RX ADMIN — KETOROLAC TROMETHAMINE 30 MG: 30 INJECTION, SOLUTION INTRAMUSCULAR at 17:40

## 2024-06-10 ASSESSMENT — LIFESTYLE VARIABLES
HOW OFTEN DO YOU HAVE A DRINK CONTAINING ALCOHOL: NEVER
HOW MANY STANDARD DRINKS CONTAINING ALCOHOL DO YOU HAVE ON A TYPICAL DAY: PATIENT DOES NOT DRINK

## 2024-06-10 ASSESSMENT — PAIN DESCRIPTION - ORIENTATION
ORIENTATION: LEFT;LOWER
ORIENTATION: LOWER;LEFT

## 2024-06-10 ASSESSMENT — PAIN DESCRIPTION - PAIN TYPE: TYPE: ACUTE PAIN

## 2024-06-10 ASSESSMENT — PAIN DESCRIPTION - DESCRIPTORS
DESCRIPTORS: ACHING
DESCRIPTORS: DISCOMFORT;SHARP

## 2024-06-10 ASSESSMENT — PAIN DESCRIPTION - LOCATION: LOCATION: BACK

## 2024-06-10 ASSESSMENT — PAIN SCALES - GENERAL
PAINLEVEL_OUTOF10: 10
PAINLEVEL_OUTOF10: 0

## 2024-06-10 ASSESSMENT — PAIN DESCRIPTION - FREQUENCY: FREQUENCY: CONTINUOUS

## 2024-06-10 NOTE — ED NOTES
Discharge instructions were given to the patient by Kortney Barker RN  .     The patient left the Emergency Department ambulatory, alert and oriented and in no acute distress with 2 prescriptions. The patient was encouraged to call or return to the ED for worsening issues or problems and was encouraged to schedule a follow up appointment for continuing care. Patient discharged home ambulatory with a steady gait.    The patient verbalized understanding of discharge instructions and prescriptions, all questions were answered. The patient has no further concerns at this time.

## 2024-06-10 NOTE — ED PROVIDER NOTES
Get Your Medications        These medications were sent to [place], [place] - [address] - P [phone] - F [phone] [address] [address]      Phone: [phone]   gabapentin 100 MG capsule  predniSONE 20 MG tablet           DISCONTINUED MEDICATIONS:  Discharge Medication List as of 6/10/2024  6:07 PM        I have seen and evaluated the patient autonomously. My supervision physician was on site and available for consultation if needed.     I am the Primary Clinician of Record.   Ca Mena PA-C (electronically signed)    (Please note that parts of this dictation were completed with voice recognition software. Quite often unanticipated grammatical, syntax, homophones, and other interpretive errors are inadvertently transcribed by the computer software. Please disregards these errors. Please excuse any errors that have escaped final proofreading.)       Ca Mena PA-C  06/10/24 9530

## 2024-06-10 NOTE — ED TRIAGE NOTES
Patient reports dropping a canister of liquid nails last night- causing back pain and pulled muscle on left side back. Patient called EMS but an ambulance never showed today, so he biked himself to the ER.

## 2024-06-26 ENCOUNTER — HOSPITAL ENCOUNTER (EMERGENCY)
Facility: HOSPITAL | Age: 69
Discharge: HOME | End: 2024-06-26
Attending: STUDENT IN AN ORGANIZED HEALTH CARE EDUCATION/TRAINING PROGRAM
Payer: MEDICARE

## 2024-06-26 VITALS
HEIGHT: 71 IN | RESPIRATION RATE: 18 BRPM | BODY MASS INDEX: 36.82 KG/M2 | OXYGEN SATURATION: 93 % | WEIGHT: 263 LBS | SYSTOLIC BLOOD PRESSURE: 162 MMHG | HEART RATE: 83 BPM | DIASTOLIC BLOOD PRESSURE: 99 MMHG | TEMPERATURE: 99 F

## 2024-06-26 DIAGNOSIS — E87.6 HYPOKALEMIA: ICD-10-CM

## 2024-06-26 DIAGNOSIS — R31.9 HEMATURIA, UNSPECIFIED TYPE: Primary | ICD-10-CM

## 2024-06-26 LAB
ALBUMIN SERPL-MCNC: 3.6 G/DL (ref 3.5–5)
ALBUMIN/GLOB SERPL: 1 (ref 1.1–2.2)
ALP SERPL-CCNC: 85 U/L (ref 45–117)
ALT SERPL-CCNC: 30 U/L (ref 12–78)
ANION GAP SERPL CALC-SCNC: 9 MMOL/L (ref 5–15)
APPEARANCE UR: CLEAR
AST SERPL-CCNC: 19 U/L (ref 15–37)
BACTERIA URNS QL MICRO: NEGATIVE /HPF
BASOPHILS # BLD: 0.1 K/UL (ref 0–0.1)
BASOPHILS NFR BLD: 1 % (ref 0–1)
BILIRUB SERPL-MCNC: 0.5 MG/DL (ref 0.2–1)
BILIRUB UR QL: NEGATIVE
BUN SERPL-MCNC: 17 MG/DL (ref 6–20)
BUN/CREAT SERPL: 12 (ref 12–20)
CALCIUM SERPL-MCNC: 9.1 MG/DL (ref 8.5–10.1)
CHLORIDE SERPL-SCNC: 107 MMOL/L (ref 97–108)
CO2 SERPL-SCNC: 28 MMOL/L (ref 21–32)
COLOR UR: ABNORMAL
CREAT SERPL-MCNC: 1.37 MG/DL (ref 0.7–1.3)
DIFFERENTIAL METHOD BLD: ABNORMAL
EOSINOPHIL # BLD: 0.2 K/UL (ref 0–0.4)
EOSINOPHIL NFR BLD: 2 % (ref 0–7)
EPITH CASTS URNS QL MICRO: ABNORMAL /LPF
ERYTHROCYTE [DISTWIDTH] IN BLOOD BY AUTOMATED COUNT: 13.7 % (ref 11.5–14.5)
GLOBULIN SER CALC-MCNC: 3.5 G/DL (ref 2–4)
GLUCOSE SERPL-MCNC: 122 MG/DL (ref 65–100)
GLUCOSE UR STRIP.AUTO-MCNC: NEGATIVE MG/DL
HCT VFR BLD AUTO: 42.6 % (ref 36.6–50.3)
HGB BLD-MCNC: 14.4 G/DL (ref 12.1–17)
HGB UR QL STRIP: ABNORMAL
IMM GRANULOCYTES # BLD AUTO: 0 K/UL (ref 0–0.04)
IMM GRANULOCYTES NFR BLD AUTO: 0 % (ref 0–0.5)
INR PPP: 1.1 (ref 0.9–1.1)
KETONES UR QL STRIP.AUTO: NEGATIVE MG/DL
LEUKOCYTE ESTERASE UR QL STRIP.AUTO: NEGATIVE
LYMPHOCYTES # BLD: 2.1 K/UL (ref 0.8–3.5)
LYMPHOCYTES NFR BLD: 21 % (ref 12–49)
MCH RBC QN AUTO: 29.8 PG (ref 26–34)
MCHC RBC AUTO-ENTMCNC: 33.8 G/DL (ref 30–36.5)
MCV RBC AUTO: 88.2 FL (ref 80–99)
MONOCYTES # BLD: 0.9 K/UL (ref 0–1)
MONOCYTES NFR BLD: 9 % (ref 5–13)
NEUTS SEG # BLD: 6.6 K/UL (ref 1.8–8)
NEUTS SEG NFR BLD: 67 % (ref 32–75)
NITRITE UR QL STRIP.AUTO: NEGATIVE
NRBC # BLD: 0 K/UL (ref 0–0.01)
NRBC BLD-RTO: 0 PER 100 WBC
PH UR STRIP: 6 (ref 5–8)
PLATELET # BLD AUTO: 273 K/UL (ref 150–400)
PMV BLD AUTO: 8.8 FL (ref 8.9–12.9)
POTASSIUM SERPL-SCNC: 3.1 MMOL/L (ref 3.5–5.1)
PROT SERPL-MCNC: 7.1 G/DL (ref 6.4–8.2)
PROT UR STRIP-MCNC: NEGATIVE MG/DL
PROTHROMBIN TIME: 11.3 SEC (ref 9–11.1)
RBC # BLD AUTO: 4.83 M/UL (ref 4.1–5.7)
RBC #/AREA URNS HPF: ABNORMAL /HPF (ref 0–5)
SODIUM SERPL-SCNC: 144 MMOL/L (ref 136–145)
SP GR UR REFRACTOMETRY: 1.02
URINE CULTURE IF INDICATED: ABNORMAL
UROBILINOGEN UR QL STRIP.AUTO: 1 EU/DL (ref 0.2–1)
WBC # BLD AUTO: 9.8 K/UL (ref 4.1–11.1)
WBC URNS QL MICRO: ABNORMAL /HPF (ref 0–4)

## 2024-06-26 PROCEDURE — 6370000000 HC RX 637 (ALT 250 FOR IP): Performed by: STUDENT IN AN ORGANIZED HEALTH CARE EDUCATION/TRAINING PROGRAM

## 2024-06-26 PROCEDURE — 85025 COMPLETE CBC W/AUTO DIFF WBC: CPT

## 2024-06-26 PROCEDURE — 99283 EMERGENCY DEPT VISIT LOW MDM: CPT

## 2024-06-26 PROCEDURE — 81001 URINALYSIS AUTO W/SCOPE: CPT

## 2024-06-26 PROCEDURE — 36415 COLL VENOUS BLD VENIPUNCTURE: CPT

## 2024-06-26 PROCEDURE — 80053 COMPREHEN METABOLIC PANEL: CPT

## 2024-06-26 PROCEDURE — 85610 PROTHROMBIN TIME: CPT

## 2024-06-26 RX ORDER — POTASSIUM CHLORIDE 750 MG/1
40 TABLET, FILM COATED, EXTENDED RELEASE ORAL ONCE
Status: COMPLETED | OUTPATIENT
Start: 2024-06-26 | End: 2024-06-26

## 2024-06-26 RX ADMIN — POTASSIUM CHLORIDE 40 MEQ: 750 TABLET, EXTENDED RELEASE ORAL at 08:26

## 2024-06-26 ASSESSMENT — PAIN - FUNCTIONAL ASSESSMENT: PAIN_FUNCTIONAL_ASSESSMENT: NONE - DENIES PAIN

## 2024-06-26 NOTE — ED NOTES
Pt presents to ED ambulatory complaining of blood and clots in his urine that started last night around 0400 am. Pt denies pain, n/v/d. Pt also reports whenever he wipes the tissue is stained bright red. Pt is on Plavix.  Pt is alert and oriented x 4, RR even and unlabored, skin is warm and dry. Assessment completed and pt updated on plan of care.  Call bell in reach.        Emergency Department Nursing Plan of Care       The Nursing Plan of Care is developed from the Nursing assessment and Emergency Department Attending provider initial evaluation.  The plan of care may be reviewed in the “ED Provider note”.    The Plan of Care was developed with the following considerations:   Patient / Family readiness to learn indicated by:verbalized understanding  Persons(s) to be included in education: patient  Barriers to Learning/Limitations:None    Signed

## 2024-06-26 NOTE — ED TRIAGE NOTES
Pt ambulatory to triage with complaint of blood and clots in his urine that started sometime last night. Pt denies any pain.  Pt is on Plavix

## 2024-06-26 NOTE — ED NOTES
Discharge instructions were given to the patient by Kacy LOONEY. The patient left the Emergency Department ambulatory, alert and oriented and in no acute distress with 0 prescription. The patient was encouraged to call or return to the ED for worsening issues or problems and was encouraged to schedule a follow up appointment for continuing care. The patient verbalized understanding of discharge instructions and prescriptions, all questions were answered. The patient has no further concerns at this time.

## 2024-06-26 NOTE — ED PROVIDER NOTES
EMERGENCY DEPARTMENT HISTORY AND PHYSICAL EXAM      Date: 6/26/2024  Patient Name: Bruce Crawford    History of Presenting Illness     Chief Complaint   Patient presents with    Hematuria         HPI: History From: patient, History limited by: none  Bruce Crawford, 69 y.o. male presents to the ED with cc of hematuria.  He states that this morning, he noted some blood and small clots in his underwear, and when he urinated, it was pinkish tinged.  This happened a few times.  He denies any acute pain.  He states that he had a injury to his right testicle in high school, he has some chronic pain secondary to that over the testicle, however no acute pain.  No vomiting, diarrhea or fever, no abdominal pain.  He takes Plavix, no other blood thinners.  Reports a history of microscopic hematuria previously for which he saw urology.          There are no other complaints, changes, or physical findings at this time.    PCP: Femi Barbour MD    No current facility-administered medications on file prior to encounter.     Current Outpatient Medications on File Prior to Encounter   Medication Sig Dispense Refill    gabapentin (NEURONTIN) 100 MG capsule Take 2 capsules by mouth 2 times daily for 5 days. Intended supply: 90 days Max Daily Amount: 400 mg 20 capsule 0    aspirin 325 MG tablet Take 1 tablet by mouth daily 30 tablet 0    fluticasone-umeclidin-vilant (TRELEGY ELLIPTA) 100-62.5-25 MCG/ACT AEPB inhaler Inhale 1 puff into the lungs daily 1 each 0    atorvastatin (LIPITOR) 80 MG tablet Take 1 tablet by mouth nightly 30 tablet 0    amLODIPine (NORVASC) 5 MG tablet Take 1 tablet by mouth daily 30 tablet 0    finasteride (PROSCAR) 5 MG tablet Take 1 tablet by mouth daily 30 tablet 0    tamsulosin (FLOMAX) 0.4 MG capsule Take 1 capsule by mouth daily 30 capsule 0    clopidogrel (PLAVIX) 75 MG tablet Take 1 tablet by mouth daily 30 tablet 0    butalbital-acetaminophen-caffeine (FIORICET, ESGIC) -40 MG per tablet Take

## 2024-08-21 ENCOUNTER — HOSPITAL ENCOUNTER (OUTPATIENT)
Facility: HOSPITAL | Age: 69
Setting detail: RECURRING SERIES
Discharge: HOME | End: 2024-08-24
Payer: MEDICARE

## 2024-08-21 PROCEDURE — 97161 PT EVAL LOW COMPLEX 20 MIN: CPT | Performed by: PHYSICAL THERAPIST

## 2024-08-21 PROCEDURE — 97140 MANUAL THERAPY 1/> REGIONS: CPT | Performed by: PHYSICAL THERAPIST

## 2024-08-21 PROCEDURE — 97110 THERAPEUTIC EXERCISES: CPT | Performed by: PHYSICAL THERAPIST

## 2024-08-21 NOTE — PROGRESS NOTES
[facility]   [address], [address]   [facility], [address]  Phone: [phone]   Fax: [phone]         PHYSICAL THERAPY - EVALUATION/PLAN OF CARE NOTE (updated 3/23)      Date: 2024          Patient Name:  Bruce Crawford :  1955   Medical   Diagnosis:  Other low back pain [M54.59] Treatment Diagnosis:  M54.59  OTHER LOWER BACK PAIN    Referral Source:  Femi Barbour MD Provider #:  [id number]                Insurance: Payor: [employer] MEDICARE / Plan: [employer] [facility] MEDICARE / Product Type: *No Product type* /      Patient  verified yes     Visit #   Current  / Total 1 12   Time   In / Out 1300 1357   Total Treatment Time 57   Total Timed Codes 2         SUBJECTIVE  Pain Level (0-10 scale): 4/10  [x]constant []intermittent []improving []worsening []no change since onset  Pain Quality: shooting, stiff, electrical \"like someone is chopping an axe into my spine\"  Aggravating Factors: walking, standing, lifting  Alleviating Factors: medication: prednisone (not currently taking), knee brace, stretching (into flexion)  Any medication changes, allergies to medications, adverse drug reactions, diagnosis change, or new procedure performed?: [x] No    [] Yes (see summary sheet for update)  Medications: Verified on Patient Summary List    Subjective functional status/changes:     \"I have lower back disc disease diagnosed by Femi Barbour or Femi Barbour. I was taking prednisone, but he took me off that and I don't have any pads for my TENS unit and now my pain is getting worse.\"   \"I also think some of my pain is coming from my spongy testicle, but I haven't followed up with urology. They said they were going to put a camera in my penis.\"  \"I was flipping a 400lb tire at [employer] gym, but I am not doing that anymore.\"    Start of Care: 2024  Onset Date: Chronic LBP, recent exacerbation 6/10/24   Current symptoms/Complaints: LBP

## 2024-09-03 ENCOUNTER — HOSPITAL ENCOUNTER (EMERGENCY)
Facility: HOSPITAL | Age: 69
Discharge: HOME | End: 2024-09-04
Attending: EMERGENCY MEDICINE
Payer: MEDICARE

## 2024-09-03 DIAGNOSIS — R42 ORTHOSTATIC LIGHTHEADEDNESS: Primary | ICD-10-CM

## 2024-09-03 LAB
ALBUMIN SERPL-MCNC: 3.6 G/DL (ref 3.5–5)
ALBUMIN/GLOB SERPL: 1 (ref 1.1–2.2)
ALP SERPL-CCNC: 93 U/L (ref 45–117)
ALT SERPL-CCNC: 43 U/L (ref 12–78)
ANION GAP SERPL CALC-SCNC: 7 MMOL/L (ref 5–15)
AST SERPL-CCNC: 26 U/L (ref 15–37)
BASOPHILS # BLD: 0.1 K/UL (ref 0–0.1)
BASOPHILS NFR BLD: 1 % (ref 0–1)
BILIRUB SERPL-MCNC: 0.3 MG/DL (ref 0.2–1)
BUN SERPL-MCNC: 20 MG/DL (ref 6–20)
BUN/CREAT SERPL: 14 (ref 12–20)
CALCIUM SERPL-MCNC: 9.3 MG/DL (ref 8.5–10.1)
CHLORIDE SERPL-SCNC: 106 MMOL/L (ref 97–108)
CO2 SERPL-SCNC: 31 MMOL/L (ref 21–32)
CREAT SERPL-MCNC: 1.46 MG/DL (ref 0.7–1.3)
DIFFERENTIAL METHOD BLD: NORMAL
EOSINOPHIL # BLD: 0.2 K/UL (ref 0–0.4)
EOSINOPHIL NFR BLD: 3 % (ref 0–7)
ERYTHROCYTE [DISTWIDTH] IN BLOOD BY AUTOMATED COUNT: 13.4 % (ref 11.5–14.5)
GLOBULIN SER CALC-MCNC: 3.7 G/DL (ref 2–4)
GLUCOSE SERPL-MCNC: 108 MG/DL (ref 65–100)
HCT VFR BLD AUTO: 45.4 % (ref 36.6–50.3)
HGB BLD-MCNC: 15 G/DL (ref 12.1–17)
IMM GRANULOCYTES # BLD AUTO: 0 K/UL (ref 0–0.04)
IMM GRANULOCYTES NFR BLD AUTO: 0 % (ref 0–0.5)
LYMPHOCYTES # BLD: 1.4 K/UL (ref 0.8–3.5)
LYMPHOCYTES NFR BLD: 17 % (ref 12–49)
MCH RBC QN AUTO: 30.3 PG (ref 26–34)
MCHC RBC AUTO-ENTMCNC: 33 G/DL (ref 30–36.5)
MCV RBC AUTO: 91.7 FL (ref 80–99)
MONOCYTES # BLD: 0.9 K/UL (ref 0–1)
MONOCYTES NFR BLD: 10 % (ref 5–13)
NEUTS SEG # BLD: 6 K/UL (ref 1.8–8)
NEUTS SEG NFR BLD: 69 % (ref 32–75)
NRBC # BLD: 0 K/UL (ref 0–0.01)
NRBC BLD-RTO: 0 PER 100 WBC
PLATELET # BLD AUTO: 273 K/UL (ref 150–400)
PMV BLD AUTO: 8.9 FL (ref 8.9–12.9)
POTASSIUM SERPL-SCNC: 3.6 MMOL/L (ref 3.5–5.1)
PROT SERPL-MCNC: 7.3 G/DL (ref 6.4–8.2)
RBC # BLD AUTO: 4.95 M/UL (ref 4.1–5.7)
SODIUM SERPL-SCNC: 144 MMOL/L (ref 136–145)
TROPONIN I SERPL HS-MCNC: 10 NG/L (ref 0–76)
WBC # BLD AUTO: 8.6 K/UL (ref 4.1–11.1)

## 2024-09-03 PROCEDURE — 99284 EMERGENCY DEPT VISIT MOD MDM: CPT

## 2024-09-03 PROCEDURE — 80053 COMPREHEN METABOLIC PANEL: CPT

## 2024-09-03 PROCEDURE — 84484 ASSAY OF TROPONIN QUANT: CPT

## 2024-09-03 PROCEDURE — 36415 COLL VENOUS BLD VENIPUNCTURE: CPT

## 2024-09-03 PROCEDURE — 96360 HYDRATION IV INFUSION INIT: CPT

## 2024-09-03 PROCEDURE — 2580000003 HC RX 258: Performed by: EMERGENCY MEDICINE

## 2024-09-03 PROCEDURE — 85025 COMPLETE CBC W/AUTO DIFF WBC: CPT

## 2024-09-03 RX ORDER — 0.9 % SODIUM CHLORIDE 0.9 %
1000 INTRAVENOUS SOLUTION INTRAVENOUS ONCE
Status: COMPLETED | OUTPATIENT
Start: 2024-09-03 | End: 2024-09-03

## 2024-09-03 RX ADMIN — SODIUM CHLORIDE 1000 ML: 9 INJECTION, SOLUTION INTRAVENOUS at 22:27

## 2024-09-03 ASSESSMENT — PAIN - FUNCTIONAL ASSESSMENT: PAIN_FUNCTIONAL_ASSESSMENT: NONE - DENIES PAIN

## 2024-09-04 ENCOUNTER — TELEPHONE (OUTPATIENT)
Facility: HOSPITAL | Age: 69
End: 2024-09-04

## 2024-09-04 VITALS
BODY MASS INDEX: 32.1 KG/M2 | RESPIRATION RATE: 23 BRPM | OXYGEN SATURATION: 97 % | DIASTOLIC BLOOD PRESSURE: 93 MMHG | WEIGHT: 237 LBS | SYSTOLIC BLOOD PRESSURE: 140 MMHG | HEART RATE: 66 BPM | TEMPERATURE: 98.2 F | HEIGHT: 72 IN

## 2024-09-04 LAB — TROPONIN I SERPL HS-MCNC: 11 NG/L (ref 0–76)

## 2024-09-04 NOTE — ED TRIAGE NOTES
Pt arrived via EMS c/o dizziness/lightheadedness that started approximately 1 hour ago while he was smoking a cigarette. Pt also reports that he has not taken any of his medication for the past 5 days because he has been using crack cocaine and cannot remember.  
abnormal fetal heart rate tracing

## 2024-09-04 NOTE — ED PROVIDER NOTES
[facility] EMERGENCY DEPT  EMERGENCY DEPARTMENT ENCOUNTER       Pt Name: Bruce Crawford  MRN: [id number]  Birthdate 1955  Date of evaluation: 9/3/2024  Provider: Hayde Gibbons MD   PCP: Femi Barbour MD  Note Started: 10:35 PM EDT 9/3/24     CHIEF COMPLAINT       Chief Complaint   Patient presents with    Dizziness        HISTORY OF PRESENT ILLNESS: 1 or more elements      History From: patient, History limited by: none     Bruce Crawford is a 69 y.o. male who presents with chief complaint of lightheadedness.       Please See MDM for Additional Details of the HPI/PMH  Nursing Notes were all reviewed and agreed with or any disagreements were addressed in the HPI.     REVIEW OF SYSTEMS        Positives and Pertinent negatives as per HPI.    PAST HISTORY     Past Medical History:  Past Medical History:   Diagnosis Date    Frequent headaches     Hypertension     Muscle pain     Neuropathy     Visual disturbance        Past Surgical History:  Past Surgical History:   Procedure Laterality Date    HERNIA REPAIR      times 2       Family History:  Family History   Problem Relation Age of Onset    Neuropathy Father     Stroke Mother     Heart Disease Father        Social History:  Social History     Tobacco Use    Smoking status: Some Days     Current packs/day: 0.25     Average packs/day: 0.3 packs/day for 30.0 years (7.5 ttl pk-yrs)     Types: Cigarettes    Smokeless tobacco: Never    Tobacco comments:     Smokes \"real tobacco\" - smokes self rolled 2-3 cigarettes per day   Vaping Use    Vaping status: Never Used   Substance Use Topics    Alcohol use: Yes     Comment: ocassionally    Drug use: Yes     Types: Cocaine     Comment: occ, last use earlier tonight. inhalation       Allergies:  No Known Allergies    CURRENT MEDICATIONS      Previous Medications    AMLODIPINE (NORVASC) 5 MG TABLET    Take 1 tablet by mouth daily    ASPIRIN 325 MG TABLET    Take 1 tablet by mouth daily    ATORVASTATIN (LIPITOR) 80 MG TABLET

## 2024-09-04 NOTE — ED NOTES
Discharge instructions were given to the patient by Boom.     The patient left the Emergency Department alert and oriented and in no acute distress with 0 prescriptions. The patient was encouraged to call or return to the ED for worsening issues or problems and was encouraged to schedule a follow up appointment for continuing care.     Ambulation assessment completed before discharge.  Pt left Emergency Department ambulating at baseline with no ortho devices  Ortho device education: none    The patient verbalized understanding of discharge instructions and prescriptions, all questions were answered. The patient has no further concerns at this time.

## 2024-09-04 NOTE — TELEPHONE ENCOUNTER
BEST  Late Entry  8-28-24 & 9-2-24.   Received call from Administration - Patient wanted to talk to someone in Care Management due to being interested in a Recovery Program.  The Phone # he left was [phone].   Called our Peer Recovery  Counselor  Tevin Fairchild  - She had reached out to patient several times in the past without patient returning her calls.     She agreed to call him at the # above to discuss treatment options.     8-29-24  Patient left  for me  with two # to reach him on- [phone] & [phone].     9-2-24 Called Ms. Fairchild she indicates she was able to reach the patient and she was taking him to the [facility] for inpatient treatment.     Kylee Dejesus MSW RN   [phone]

## 2024-09-05 LAB
EKG ATRIAL RATE: 68 BPM
EKG DIAGNOSIS: NORMAL
EKG P AXIS: 36 DEGREES
EKG P-R INTERVAL: 150 MS
EKG Q-T INTERVAL: 388 MS
EKG QRS DURATION: 100 MS
EKG QTC CALCULATION (BAZETT): 412 MS
EKG R AXIS: -64 DEGREES
EKG T AXIS: 89 DEGREES
EKG VENTRICULAR RATE: 68 BPM

## 2024-09-19 ENCOUNTER — HOSPITAL ENCOUNTER (EMERGENCY)
Facility: HOSPITAL | Age: 69
Discharge: HOME | End: 2024-09-20
Payer: MEDICARE

## 2024-09-19 DIAGNOSIS — G89.29 ACUTE EXACERBATION OF CHRONIC LOW BACK PAIN: Primary | ICD-10-CM

## 2024-09-19 DIAGNOSIS — I10 ESSENTIAL HYPERTENSION: ICD-10-CM

## 2024-09-19 DIAGNOSIS — M54.50 ACUTE EXACERBATION OF CHRONIC LOW BACK PAIN: Primary | ICD-10-CM

## 2024-09-19 DIAGNOSIS — R31.21 ASYMPTOMATIC MICROSCOPIC HEMATURIA: ICD-10-CM

## 2024-09-19 LAB
APPEARANCE UR: CLEAR
BACTERIA URNS QL MICRO: NEGATIVE /HPF
BILIRUB UR QL: NEGATIVE
COLOR UR: ABNORMAL
EPITH CASTS URNS QL MICRO: ABNORMAL /LPF
GLUCOSE UR STRIP.AUTO-MCNC: NEGATIVE MG/DL
HGB UR QL STRIP: ABNORMAL
HYALINE CASTS URNS QL MICRO: ABNORMAL /LPF (ref 0–2)
KETONES UR QL STRIP.AUTO: NEGATIVE MG/DL
LEUKOCYTE ESTERASE UR QL STRIP.AUTO: NEGATIVE
NITRITE UR QL STRIP.AUTO: NEGATIVE
PH UR STRIP: 6 (ref 5–8)
PROT UR STRIP-MCNC: NEGATIVE MG/DL
RBC #/AREA URNS HPF: ABNORMAL /HPF (ref 0–5)
SP GR UR REFRACTOMETRY: 1.02
URINE CULTURE IF INDICATED: ABNORMAL
UROBILINOGEN UR QL STRIP.AUTO: 0.2 EU/DL (ref 0.2–1)
WBC URNS QL MICRO: ABNORMAL /HPF (ref 0–4)

## 2024-09-19 PROCEDURE — 81001 URINALYSIS AUTO W/SCOPE: CPT

## 2024-09-19 PROCEDURE — 99283 EMERGENCY DEPT VISIT LOW MDM: CPT

## 2024-09-19 RX ORDER — ACETAMINOPHEN 325 MG/1
650 TABLET ORAL
Status: COMPLETED | OUTPATIENT
Start: 2024-09-19 | End: 2024-09-20

## 2024-09-19 RX ORDER — CYCLOBENZAPRINE HCL 10 MG
10 TABLET ORAL 3 TIMES DAILY PRN
Status: DISCONTINUED | OUTPATIENT
Start: 2024-09-19 | End: 2024-09-20 | Stop reason: HOSPADM

## 2024-09-19 ASSESSMENT — PAIN SCALES - GENERAL
PAINLEVEL_OUTOF10: 10
PAINLEVEL_OUTOF10: 5

## 2024-09-19 ASSESSMENT — PAIN - FUNCTIONAL ASSESSMENT: PAIN_FUNCTIONAL_ASSESSMENT: 0-10

## 2024-09-20 VITALS
HEART RATE: 67 BPM | OXYGEN SATURATION: 96 % | DIASTOLIC BLOOD PRESSURE: 97 MMHG | TEMPERATURE: 98.4 F | RESPIRATION RATE: 16 BRPM | SYSTOLIC BLOOD PRESSURE: 128 MMHG

## 2024-09-20 PROCEDURE — 6370000000 HC RX 637 (ALT 250 FOR IP): Performed by: PHYSICIAN ASSISTANT

## 2024-09-20 RX ORDER — ACETAMINOPHEN 325 MG/1
650 TABLET ORAL EVERY 6 HOURS PRN
Qty: 20 TABLET | Refills: 0 | Status: SHIPPED | OUTPATIENT
Start: 2024-09-20

## 2024-09-20 RX ORDER — CYCLOBENZAPRINE HCL 10 MG
10 TABLET ORAL 3 TIMES DAILY PRN
Qty: 21 TABLET | Refills: 0 | Status: SHIPPED | OUTPATIENT
Start: 2024-09-20 | End: 2024-09-30

## 2024-09-20 RX ORDER — PREDNISONE 10 MG/1
TABLET ORAL
Qty: 1 EACH | Refills: 0 | Status: SHIPPED | OUTPATIENT
Start: 2024-09-20

## 2024-09-20 RX ADMIN — CYCLOBENZAPRINE 10 MG: 10 TABLET, FILM COATED ORAL at 00:00

## 2024-09-20 RX ADMIN — ACETAMINOPHEN 650 MG: 325 TABLET ORAL at 00:00

## 2024-09-20 ASSESSMENT — PAIN SCALES - GENERAL: PAINLEVEL_OUTOF10: 5

## 2024-09-20 ASSESSMENT — ENCOUNTER SYMPTOMS: BACK PAIN: 1

## 2024-10-07 ENCOUNTER — APPOINTMENT (OUTPATIENT)
Facility: HOSPITAL | Age: 69
DRG: 069 | End: 2024-10-07
Payer: MEDICARE

## 2024-10-07 ENCOUNTER — HOSPITAL ENCOUNTER (INPATIENT)
Facility: HOSPITAL | Age: 69
LOS: 1 days | Discharge: HOME | DRG: 069 | End: 2024-10-09
Attending: EMERGENCY MEDICINE | Admitting: FAMILY MEDICINE
Payer: MEDICARE

## 2024-10-07 DIAGNOSIS — R29.810 FACIAL DROOP: Primary | ICD-10-CM

## 2024-10-07 DIAGNOSIS — R47.81 SLURRED SPEECH: ICD-10-CM

## 2024-10-07 DIAGNOSIS — I63.19 CEREBROVASCULAR ACCIDENT (CVA) DUE TO EMBOLISM OF OTHER PRECEREBRAL ARTERY (HCC): ICD-10-CM

## 2024-10-07 PROBLEM — G45.9 TIA (TRANSIENT ISCHEMIC ATTACK): Status: ACTIVE | Noted: 2024-10-07

## 2024-10-07 LAB
ALBUMIN SERPL-MCNC: 3.5 G/DL (ref 3.5–5)
ALBUMIN/GLOB SERPL: 1 (ref 1.1–2.2)
ALP SERPL-CCNC: 92 U/L (ref 45–117)
ALT SERPL-CCNC: 36 U/L (ref 12–78)
AMPHET UR QL SCN: NEGATIVE
ANION GAP SERPL CALC-SCNC: 2 MMOL/L (ref 2–12)
AST SERPL-CCNC: 26 U/L (ref 15–37)
BARBITURATES UR QL SCN: NEGATIVE
BASOPHILS # BLD: 0.1 K/UL (ref 0–0.1)
BASOPHILS NFR BLD: 1 % (ref 0–1)
BENZODIAZ UR QL: NEGATIVE
BILIRUB SERPL-MCNC: 0.7 MG/DL (ref 0.2–1)
BUN SERPL-MCNC: 18 MG/DL (ref 6–20)
BUN/CREAT SERPL: 12 (ref 12–20)
CALCIUM SERPL-MCNC: 9.1 MG/DL (ref 8.5–10.1)
CANNABINOIDS UR QL SCN: NEGATIVE
CHLORIDE SERPL-SCNC: 108 MMOL/L (ref 97–108)
CO2 SERPL-SCNC: 29 MMOL/L (ref 21–32)
COCAINE UR QL SCN: NEGATIVE
COMMENT:: NORMAL
CREAT SERPL-MCNC: 1.46 MG/DL (ref 0.7–1.3)
DIFFERENTIAL METHOD BLD: NORMAL
EOSINOPHIL # BLD: 0.2 K/UL (ref 0–0.4)
EOSINOPHIL NFR BLD: 2 % (ref 0–7)
ERYTHROCYTE [DISTWIDTH] IN BLOOD BY AUTOMATED COUNT: 13.2 % (ref 11.5–14.5)
GLOBULIN SER CALC-MCNC: 3.5 G/DL (ref 2–4)
GLUCOSE SERPL-MCNC: 159 MG/DL (ref 65–100)
HCT VFR BLD AUTO: 41.4 % (ref 36.6–50.3)
HGB BLD-MCNC: 13.9 G/DL (ref 12.1–17)
IMM GRANULOCYTES # BLD AUTO: 0 K/UL (ref 0–0.04)
IMM GRANULOCYTES NFR BLD AUTO: 0 % (ref 0–0.5)
INR PPP: 1.1 (ref 0.9–1.1)
LYMPHOCYTES # BLD: 1.5 K/UL (ref 0.8–3.5)
LYMPHOCYTES NFR BLD: 17 % (ref 12–49)
Lab: NORMAL
MCH RBC QN AUTO: 30.3 PG (ref 26–34)
MCHC RBC AUTO-ENTMCNC: 33.6 G/DL (ref 30–36.5)
MCV RBC AUTO: 90.4 FL (ref 80–99)
METHADONE UR QL: NEGATIVE
MONOCYTES # BLD: 0.8 K/UL (ref 0–1)
MONOCYTES NFR BLD: 9 % (ref 5–13)
NEUTS SEG # BLD: 6.2 K/UL (ref 1.8–8)
NEUTS SEG NFR BLD: 71 % (ref 32–75)
NRBC # BLD: 0 K/UL (ref 0–0.01)
NRBC BLD-RTO: 0 PER 100 WBC
OPIATES UR QL: NEGATIVE
P2Y12 PLT RESPONSE: 270 PRU (ref 194–418)
PCP UR QL: NEGATIVE
PLATELET # BLD AUTO: 260 K/UL (ref 150–400)
PMV BLD AUTO: 9.5 FL (ref 8.9–12.9)
POTASSIUM SERPL-SCNC: 3.4 MMOL/L (ref 3.5–5.1)
PROT SERPL-MCNC: 7 G/DL (ref 6.4–8.2)
PROTHROMBIN TIME: 11.4 SEC (ref 9–11.1)
RBC # BLD AUTO: 4.58 M/UL (ref 4.1–5.7)
SODIUM SERPL-SCNC: 139 MMOL/L (ref 136–145)
SPECIMEN HOLD: NORMAL
TROPONIN I SERPL HS-MCNC: 13 NG/L (ref 0–76)
WBC # BLD AUTO: 8.7 K/UL (ref 4.1–11.1)

## 2024-10-07 PROCEDURE — G0378 HOSPITAL OBSERVATION PER HR: HCPCS

## 2024-10-07 PROCEDURE — 85576 BLOOD PLATELET AGGREGATION: CPT

## 2024-10-07 PROCEDURE — 70450 CT HEAD/BRAIN W/O DYE: CPT

## 2024-10-07 PROCEDURE — 80307 DRUG TEST PRSMV CHEM ANLYZR: CPT

## 2024-10-07 PROCEDURE — 36415 COLL VENOUS BLD VENIPUNCTURE: CPT

## 2024-10-07 PROCEDURE — 0042T CT BRAIN PERFUSION: CPT

## 2024-10-07 PROCEDURE — APPNB30 APP NON BILLABLE TIME 0-30 MINS: Performed by: NURSE PRACTITIONER

## 2024-10-07 PROCEDURE — 80053 COMPREHEN METABOLIC PANEL: CPT

## 2024-10-07 PROCEDURE — 84484 ASSAY OF TROPONIN QUANT: CPT

## 2024-10-07 PROCEDURE — 6360000004 HC RX CONTRAST MEDICATION: Performed by: RADIOLOGY

## 2024-10-07 PROCEDURE — 70498 CT ANGIOGRAPHY NECK: CPT

## 2024-10-07 PROCEDURE — 6370000000 HC RX 637 (ALT 250 FOR IP): Performed by: HOSPITALIST

## 2024-10-07 PROCEDURE — 85025 COMPLETE CBC W/AUTO DIFF WBC: CPT

## 2024-10-07 PROCEDURE — 6360000002 HC RX W HCPCS: Performed by: HOSPITALIST

## 2024-10-07 PROCEDURE — 96372 THER/PROPH/DIAG INJ SC/IM: CPT

## 2024-10-07 PROCEDURE — 85610 PROTHROMBIN TIME: CPT

## 2024-10-07 PROCEDURE — 4A03X5D MEASUREMENT OF ARTERIAL FLOW, INTRACRANIAL, EXTERNAL APPROACH: ICD-10-PCS | Performed by: HOSPITALIST

## 2024-10-07 PROCEDURE — 99285 EMERGENCY DEPT VISIT HI MDM: CPT

## 2024-10-07 PROCEDURE — 93005 ELECTROCARDIOGRAM TRACING: CPT | Performed by: EMERGENCY MEDICINE

## 2024-10-07 RX ORDER — ONDANSETRON 2 MG/ML
4 INJECTION INTRAMUSCULAR; INTRAVENOUS EVERY 6 HOURS PRN
Status: DISCONTINUED | OUTPATIENT
Start: 2024-10-07 | End: 2024-10-09 | Stop reason: HOSPADM

## 2024-10-07 RX ORDER — SODIUM CHLORIDE 0.9 % (FLUSH) 0.9 %
5-40 SYRINGE (ML) INJECTION PRN
Status: DISCONTINUED | OUTPATIENT
Start: 2024-10-07 | End: 2024-10-09 | Stop reason: HOSPADM

## 2024-10-07 RX ORDER — ASPIRIN 300 MG/1
300 SUPPOSITORY RECTAL DAILY
Status: DISCONTINUED | OUTPATIENT
Start: 2024-10-07 | End: 2024-10-08

## 2024-10-07 RX ORDER — IOPAMIDOL 755 MG/ML
100 INJECTION, SOLUTION INTRAVASCULAR
Status: COMPLETED | OUTPATIENT
Start: 2024-10-07 | End: 2024-10-07

## 2024-10-07 RX ORDER — ROSUVASTATIN CALCIUM 10 MG/1
40 TABLET, COATED ORAL NIGHTLY
Status: DISCONTINUED | OUTPATIENT
Start: 2024-10-07 | End: 2024-10-07

## 2024-10-07 RX ORDER — ASPIRIN 325 MG
325 TABLET ORAL DAILY
Status: DISCONTINUED | OUTPATIENT
Start: 2024-10-07 | End: 2024-10-09 | Stop reason: HOSPADM

## 2024-10-07 RX ORDER — POLYETHYLENE GLYCOL 3350 17 G/17G
17 POWDER, FOR SOLUTION ORAL DAILY PRN
Status: DISCONTINUED | OUTPATIENT
Start: 2024-10-07 | End: 2024-10-09 | Stop reason: HOSPADM

## 2024-10-07 RX ORDER — ONDANSETRON 4 MG/1
4 TABLET, ORALLY DISINTEGRATING ORAL EVERY 8 HOURS PRN
Status: DISCONTINUED | OUTPATIENT
Start: 2024-10-07 | End: 2024-10-09 | Stop reason: HOSPADM

## 2024-10-07 RX ORDER — LIDOCAINE 4 G/G
1 PATCH TOPICAL DAILY
Status: DISCONTINUED | OUTPATIENT
Start: 2024-10-07 | End: 2024-10-09 | Stop reason: HOSPADM

## 2024-10-07 RX ORDER — ENOXAPARIN SODIUM 100 MG/ML
30 INJECTION SUBCUTANEOUS 2 TIMES DAILY
Status: DISCONTINUED | OUTPATIENT
Start: 2024-10-07 | End: 2024-10-09 | Stop reason: HOSPADM

## 2024-10-07 RX ORDER — TAMSULOSIN HYDROCHLORIDE 0.4 MG/1
0.4 CAPSULE ORAL DAILY
Status: DISCONTINUED | OUTPATIENT
Start: 2024-10-07 | End: 2024-10-09 | Stop reason: HOSPADM

## 2024-10-07 RX ORDER — SODIUM CHLORIDE 0.9 % (FLUSH) 0.9 %
5-40 SYRINGE (ML) INJECTION EVERY 12 HOURS SCHEDULED
Status: DISCONTINUED | OUTPATIENT
Start: 2024-10-07 | End: 2024-10-09 | Stop reason: HOSPADM

## 2024-10-07 RX ORDER — CLOPIDOGREL BISULFATE 75 MG/1
75 TABLET ORAL DAILY
Status: DISCONTINUED | OUTPATIENT
Start: 2024-10-07 | End: 2024-10-09 | Stop reason: HOSPADM

## 2024-10-07 RX ORDER — SODIUM CHLORIDE 9 MG/ML
INJECTION, SOLUTION INTRAVENOUS PRN
Status: DISCONTINUED | OUTPATIENT
Start: 2024-10-07 | End: 2024-10-09 | Stop reason: HOSPADM

## 2024-10-07 RX ORDER — ASPIRIN 81 MG/1
81 TABLET, CHEWABLE ORAL DAILY
Status: DISCONTINUED | OUTPATIENT
Start: 2024-10-07 | End: 2024-10-07

## 2024-10-07 RX ORDER — ATORVASTATIN CALCIUM 40 MG/1
80 TABLET, FILM COATED ORAL NIGHTLY
Status: DISCONTINUED | OUTPATIENT
Start: 2024-10-07 | End: 2024-10-09

## 2024-10-07 RX ADMIN — IOPAMIDOL 40 ML: 755 INJECTION, SOLUTION INTRAVENOUS at 13:20

## 2024-10-07 RX ADMIN — ATORVASTATIN CALCIUM 80 MG: 40 TABLET, FILM COATED ORAL at 22:43

## 2024-10-07 RX ADMIN — IOPAMIDOL 80 ML: 755 INJECTION, SOLUTION INTRAVENOUS at 13:21

## 2024-10-07 RX ADMIN — ASPIRIN 325 MG: 325 TABLET ORAL at 17:09

## 2024-10-07 RX ADMIN — ENOXAPARIN SODIUM 30 MG: 100 INJECTION SUBCUTANEOUS at 22:43

## 2024-10-07 RX ADMIN — ASPIRIN 81 MG CHEWABLE TABLET 81 MG: 81 TABLET CHEWABLE at 15:51

## 2024-10-07 ASSESSMENT — ENCOUNTER SYMPTOMS
CHEST TIGHTNESS: 0
COUGH: 0
SHORTNESS OF BREATH: 0
BLOOD IN STOOL: 0
SORE THROAT: 0
STRIDOR: 0
SINUS PRESSURE: 0
ABDOMINAL PAIN: 0
RHINORRHEA: 0
COLOR CHANGE: 0
TROUBLE SWALLOWING: 0
BACK PAIN: 0
NAUSEA: 0
SINUS PAIN: 0
EYES NEGATIVE: 1
DIARRHEA: 0
VOMITING: 0
WHEEZING: 0

## 2024-10-07 ASSESSMENT — PAIN - FUNCTIONAL ASSESSMENT: PAIN_FUNCTIONAL_ASSESSMENT: NONE - DENIES PAIN

## 2024-10-07 NOTE — ED NOTES
Patient comes via EMS with c/c of aphasia, numbness of the right arm, and drowsiness. LKW 2130 10/6. Pt takes Pavix,  and temp 97.2 per EMS. Code stroke, level 2 van negative called.

## 2024-10-07 NOTE — H&P
pertinent and relevant details are outlined as above.    Objective:   BP (!) 145/72   Pulse 68   Temp 97.2 °F (36.2 °C)   Resp 18   Wt 117 kg (258 lb)   SpO2 97%   BMI 35.48 kg/m²         PHYSICAL EXAM:   General: Alert x oriented x 3, awake, no acute distress,   HEENT: PEERL, EOMI, moist mucus membranes  Neck: Supple, no JVD, no meningeal signs  Chest: Clear to auscultation bilaterally   CVS: RRR, S1 S2 heard, no murmurs/rubs/gallops  Abd: Soft, non-tender, non-distended, +bowel sounds   Ext: No clubbing, no cyanosis, no edema  Neuro/Psych: Pleasant mood and affect, CN 2-12 grossly intact, sensory grossly within normal limit, Strength 5/5 in all extremities  Cap refill: Brisk, less than 3 seconds  Pulses: 2+, symmetric in all extremities  Skin: Warm, dry, without rashes or lesions    Data Review:   I have independently reviewed and interpreted patient's lab and all other diagnostic data    Abnormal Labs Reviewed   COMPREHENSIVE METABOLIC PANEL - Abnormal; Notable for the following components:       Result Value    Potassium 3.4 (*)     Glucose 159 (*)     Creatinine 1.46 (*)     Est, Glom Filt Rate 51 (*)     Albumin/Globulin Ratio 1.0 (*)     All other components within normal limits   PROTIME-INR - Abnormal; Notable for the following components:    Protime 11.4 (*)     All other components within normal limits       Results       EKG--Sinus rhythm with marked sinus arrhythmia   Left anterior fascicular block   Nonspecific T wave abnormality              IMAGING:   CTA HEAD NECK W CONTRAST   Final Result   1. No large vessel occlusion.   2. Perfusion motion limited; no overt perfusion defect.   3. No hemodynamically significant extracranial ICA stenosis (using NASCET   criteria).                  Electronically signed by Jose Armando Leigh      CT BRAIN PERFUSION   Final Result   1. No large vessel occlusion.   2. Perfusion motion limited; no overt perfusion defect.   3. No hemodynamically significant

## 2024-10-07 NOTE — ED PROVIDER NOTES
[facility] EMERGENCY DEP  EMERGENCY DEPARTMENT ENCOUNTER      Pt Name: Bruce Crawford  MRN: [id number]  Birthdate 1955  Date of evaluation: 10/7/2024  Provider: Danilo Jamil MD    CHIEF COMPLAINT       Chief Complaint   Patient presents with    Aphasia         HISTORY OF PRESENT ILLNESS    This is a 69-year-old male who had a stroke about a year ago without any residual.  He states that he was fine up until 930 pm last night when he went to bed.  This morning he woke up about 5:00 am  and was noted to have a little bit of drooping of the right side of his face and some difficulty speaking.  He did not have any headache.  He denies any focal weakness or numbness.  He has no dizziness.  He denies any shortness of breath, chest pain, palpitations, nausea or vomiting and no other symptoms leading up to this event.            Review of External Medical Records:     Nursing Notes were reviewed.    REVIEW OF SYSTEMS       Review of Systems   Constitutional:  Negative for activity change, chills, fatigue and fever.   HENT:  Negative for congestion, ear pain, rhinorrhea, sinus pressure, sinus pain, sore throat and trouble swallowing.    Eyes: Negative.    Respiratory:  Negative for cough, chest tightness, shortness of breath, wheezing and stridor.    Cardiovascular:  Negative for chest pain, palpitations and leg swelling.   Gastrointestinal:  Negative for abdominal pain, blood in stool, diarrhea, nausea and vomiting.   Endocrine: Negative.    Genitourinary:  Negative for decreased urine volume, difficulty urinating, flank pain, frequency and hematuria.   Musculoskeletal:  Negative for back pain, joint swelling and neck pain.   Skin:  Negative for color change, pallor and rash.   Neurological:  Positive for speech difficulty. Negative for dizziness, syncope, weakness, numbness and headaches.        Right-sided facial droop   Psychiatric/Behavioral:  Negative for agitation and behavioral problems.              PAST

## 2024-10-07 NOTE — ED TRIAGE NOTES
Patient in through ems from home with complaints of right sided facial droop and expressive aphasia when he woke today at 0500. Went to bed at 2130 last night on 10/6. Code Stonoel Level 2 called from the field. .     Hx narcolepsy

## 2024-10-08 ENCOUNTER — APPOINTMENT (OUTPATIENT)
Facility: HOSPITAL | Age: 69
DRG: 069 | End: 2024-10-08
Attending: HOSPITALIST
Payer: MEDICARE

## 2024-10-08 PROBLEM — R29.90 STROKE-LIKE SYMPTOMS: Status: ACTIVE | Noted: 2024-10-08

## 2024-10-08 LAB
CHOLEST SERPL-MCNC: 130 MG/DL
EKG ATRIAL RATE: 72 BPM
EKG DIAGNOSIS: NORMAL
EKG P AXIS: 49 DEGREES
EKG P-R INTERVAL: 150 MS
EKG Q-T INTERVAL: 396 MS
EKG QRS DURATION: 102 MS
EKG QTC CALCULATION (BAZETT): 433 MS
EKG R AXIS: -78 DEGREES
EKG T AXIS: -6 DEGREES
EKG VENTRICULAR RATE: 72 BPM
ERYTHROCYTE [DISTWIDTH] IN BLOOD BY AUTOMATED COUNT: 13.2 % (ref 11.5–14.5)
EST. AVERAGE GLUCOSE BLD GHB EST-MCNC: 131 MG/DL
HBA1C MFR BLD: 6.2 % (ref 4–5.6)
HCT VFR BLD AUTO: 41 % (ref 36.6–50.3)
HDLC SERPL-MCNC: 57 MG/DL
HDLC SERPL: 2.3 (ref 0–5)
HGB BLD-MCNC: 13.9 G/DL (ref 12.1–17)
LDLC SERPL CALC-MCNC: 47.4 MG/DL (ref 0–100)
MCH RBC QN AUTO: 30.5 PG (ref 26–34)
MCHC RBC AUTO-ENTMCNC: 33.9 G/DL (ref 30–36.5)
MCV RBC AUTO: 89.9 FL (ref 80–99)
NRBC # BLD: 0 K/UL (ref 0–0.01)
NRBC BLD-RTO: 0 PER 100 WBC
PLATELET # BLD AUTO: 246 K/UL (ref 150–400)
PMV BLD AUTO: 9.6 FL (ref 8.9–12.9)
RBC # BLD AUTO: 4.56 M/UL (ref 4.1–5.7)
TRIGL SERPL-MCNC: 128 MG/DL
VLDLC SERPL CALC-MCNC: 25.6 MG/DL
WBC # BLD AUTO: 7.3 K/UL (ref 4.1–11.1)

## 2024-10-08 PROCEDURE — 99223 1ST HOSP IP/OBS HIGH 75: CPT | Performed by: PSYCHIATRY & NEUROLOGY

## 2024-10-08 PROCEDURE — 36415 COLL VENOUS BLD VENIPUNCTURE: CPT

## 2024-10-08 PROCEDURE — 93010 ELECTROCARDIOGRAM REPORT: CPT | Performed by: INTERNAL MEDICINE

## 2024-10-08 PROCEDURE — 2580000003 HC RX 258: Performed by: HOSPITALIST

## 2024-10-08 PROCEDURE — 6370000000 HC RX 637 (ALT 250 FOR IP): Performed by: HOSPITALIST

## 2024-10-08 PROCEDURE — 6360000002 HC RX W HCPCS: Performed by: HOSPITALIST

## 2024-10-08 PROCEDURE — 97112 NEUROMUSCULAR REEDUCATION: CPT | Performed by: OCCUPATIONAL THERAPIST

## 2024-10-08 PROCEDURE — 93308 TTE F-UP OR LMTD: CPT

## 2024-10-08 PROCEDURE — 97165 OT EVAL LOW COMPLEX 30 MIN: CPT | Performed by: OCCUPATIONAL THERAPIST

## 2024-10-08 PROCEDURE — 83036 HEMOGLOBIN GLYCOSYLATED A1C: CPT

## 2024-10-08 PROCEDURE — 97161 PT EVAL LOW COMPLEX 20 MIN: CPT

## 2024-10-08 PROCEDURE — 2060000000 HC ICU INTERMEDIATE R&B

## 2024-10-08 PROCEDURE — 85027 COMPLETE CBC AUTOMATED: CPT

## 2024-10-08 PROCEDURE — 80061 LIPID PANEL: CPT

## 2024-10-08 PROCEDURE — 92610 EVALUATE SWALLOWING FUNCTION: CPT | Performed by: SPEECH-LANGUAGE PATHOLOGIST

## 2024-10-08 PROCEDURE — 97530 THERAPEUTIC ACTIVITIES: CPT

## 2024-10-08 PROCEDURE — 92522 EVALUATE SPEECH PRODUCTION: CPT | Performed by: SPEECH-LANGUAGE PATHOLOGIST

## 2024-10-08 RX ORDER — ALBUTEROL SULFATE 90 UG/1
2 INHALANT RESPIRATORY (INHALATION) EVERY 4 HOURS PRN
COMMUNITY

## 2024-10-08 RX ORDER — ALBUTEROL SULFATE 0.83 MG/ML
2.5 SOLUTION RESPIRATORY (INHALATION) EVERY 4 HOURS PRN
Status: DISCONTINUED | OUTPATIENT
Start: 2024-10-08 | End: 2024-10-09 | Stop reason: HOSPADM

## 2024-10-08 RX ADMIN — TAMSULOSIN HYDROCHLORIDE 0.4 MG: 0.4 CAPSULE ORAL at 08:52

## 2024-10-08 RX ADMIN — Medication 10 ML: at 21:00

## 2024-10-08 RX ADMIN — Medication 10 ML: at 08:53

## 2024-10-08 RX ADMIN — ATORVASTATIN CALCIUM 80 MG: 40 TABLET, FILM COATED ORAL at 21:00

## 2024-10-08 RX ADMIN — CLOPIDOGREL BISULFATE 75 MG: 75 TABLET ORAL at 08:47

## 2024-10-08 RX ADMIN — ENOXAPARIN SODIUM 30 MG: 100 INJECTION SUBCUTANEOUS at 08:48

## 2024-10-08 RX ADMIN — ASPIRIN 325 MG: 325 TABLET ORAL at 08:47

## 2024-10-08 RX ADMIN — ENOXAPARIN SODIUM 30 MG: 100 INJECTION SUBCUTANEOUS at 21:00

## 2024-10-08 ASSESSMENT — PAIN SCALES - GENERAL: PAINLEVEL_OUTOF10: 0

## 2024-10-08 ASSESSMENT — PAIN - FUNCTIONAL ASSESSMENT
PAIN_FUNCTIONAL_ASSESSMENT: NONE - DENIES PAIN
PAIN_FUNCTIONAL_ASSESSMENT: NONE - DENIES PAIN

## 2024-10-08 NOTE — CONSULTS
Intact FTN, DEE         STUDIES AND REPORTS:  Recent Results (from the past 24 hour(s))   CBC    Collection Time: 10/08/24  4:35 AM   Result Value Ref Range    WBC 7.3 4.1 - 11.1 K/uL    RBC 4.56 4.10 - 5.70 M/uL    Hemoglobin 13.9 12.1 - 17.0 g/dL    Hematocrit 41.0 36.6 - 50.3 %    MCV 89.9 80.0 - 99.0 FL    MCH 30.5 26.0 - 34.0 PG    MCHC 33.9 30.0 - 36.5 g/dL    RDW 13.2 11.5 - 14.5 %    Platelets 246 150 - 400 K/uL    MPV 9.6 8.9 - 12.9 FL    Nucleated RBCs 0.0 0  WBC    nRBC 0.00 0.00 - 0.01 K/uL   Hemoglobin A1c    Collection Time: 10/08/24  4:35 AM   Result Value Ref Range    Hemoglobin A1C 6.2 (H) 4.0 - 5.6 %    Estimated Avg Glucose 131 mg/dL   Lipid Panel    Collection Time: 10/08/24  4:35 AM   Result Value Ref Range    Cholesterol, Total 130 <200 MG/DL    Triglycerides 128 <150 MG/DL    HDL 57 MG/DL    LDL Cholesterol 47.4 0 - 100 MG/DL    VLDL Cholesterol Calculated 25.6 MG/DL    Chol/HDL Ratio 2.3 0.0 - 5.0       MRI Result (most recent):  MRI BRAIN WO CONTRAST 05/07/2024    Narrative  EXAM:  MRI BRAIN WO CONTRAST    INDICATION: 69-year-old male with stroke like symptoms    COMPARISON: 5/6/2024 CT/CTA.    CONTRAST: None.    TECHNIQUE:  Multiplanar multisequence acquisition of the brain without  IV contrast  administration.    FINDINGS:  Patchy chronic lacunar infarcts within the left greater than right thalami and  lung periventricular left corona radiata.  There is no acute infarct.  No acute intracranial hemorrhage, extra-axial fluid  collection, or mass effect. Diffuse periventricular and patchy subcortical white  matter T2/FLAIR hyperintensity, nonspecific and likely microangiopathic ischemic  changes.. Mild diffuse parenchymal volume loss with exvacuodilatation of the  ventricles and extraventricular CSF spaces.  There is no cerebellar tonsillar herniation. Expected arterial flow-voids are  present.    The orbits unremarkable bilaterally. Mild diffuse mucosal thickening of the  paranasal  decreased weight-shifting ability

## 2024-10-08 NOTE — ED NOTES
TRANSFER - OUT REPORT:    Verbal report given to AAYUSH Coffman on Bruce Crawford  being transferred to 6S for routine progression of patient care       Report consisted of patient's Situation, Background, Assessment and   Recommendations(SBAR).     Information from the following report(s) Nurse Handoff Report, ED Encounter Summary, ED SBAR, Intake/Output, MAR, and Recent Results was reviewed with the receiving nurse.    Pope Fall Assessment:                           Lines:   Peripheral IV Left;Posterior Forearm (Active)       Peripheral IV Distal;Posterior;Right Forearm (Active)        Opportunity for questions and clarification was provided.      Patient transported with:  Registered Nurse

## 2024-10-08 NOTE — ED NOTES
Bedside and Verbal shift change report given to AAYUSH Agosto (oncoming nurse) by AAYUSH Drew (offgoing nurse). Report included the following information Nurse Handoff Report and Index.

## 2024-10-09 VITALS
DIASTOLIC BLOOD PRESSURE: 102 MMHG | SYSTOLIC BLOOD PRESSURE: 149 MMHG | OXYGEN SATURATION: 95 % | TEMPERATURE: 98.2 F | RESPIRATION RATE: 19 BRPM | BODY MASS INDEX: 35.53 KG/M2 | HEART RATE: 96 BPM | WEIGHT: 258.38 LBS

## 2024-10-09 LAB
ECHO AV MEAN GRADIENT: 6 MMHG
ECHO AV MEAN VELOCITY: 1.2 M/S
ECHO AV PEAK GRADIENT: 12 MMHG
ECHO AV PEAK VELOCITY: 1.7 M/S
ECHO AV VELOCITY RATIO: 0.65
ECHO AV VTI: 35.4 CM
ECHO LV EDV A2C: 95 ML
ECHO LV EDV A4C: 120 ML
ECHO LV EDV BP: 106 ML (ref 67–155)
ECHO LV EJECTION FRACTION A2C: 51 %
ECHO LV EJECTION FRACTION A4C: 57 %
ECHO LV EJECTION FRACTION BIPLANE: 50 % (ref 55–100)
ECHO LV ESV A2C: 46 ML
ECHO LV ESV A4C: 51 ML
ECHO LV ESV BP: 53 ML (ref 22–58)
ECHO LV FRACTIONAL SHORTENING: 29 % (ref 28–44)
ECHO LV INTERNAL DIMENSION DIASTOLIC: 4.8 CM (ref 4.2–5.9)
ECHO LV INTERNAL DIMENSION SYSTOLIC: 3.4 CM
ECHO LV IVSD: 1.1 CM (ref 0.6–1)
ECHO LV MASS 2D: 219.1 G (ref 88–224)
ECHO LV POSTERIOR WALL DIASTOLIC: 1.3 CM (ref 0.6–1)
ECHO LV RELATIVE WALL THICKNESS RATIO: 0.54
ECHO LVOT AV VTI INDEX: 0.73
ECHO LVOT MEAN GRADIENT: 2 MMHG
ECHO LVOT PEAK GRADIENT: 5 MMHG
ECHO LVOT PEAK VELOCITY: 1.1 M/S
ECHO LVOT VTI: 25.9 CM
ECHO MV REGURGITANT PEAK GRADIENT: 64 MMHG
ECHO MV REGURGITANT PEAK VELOCITY: 4 M/S
ECHO TV REGURGITANT MAX VELOCITY: 3.01 M/S
ECHO TV REGURGITANT PEAK GRADIENT: 36 MMHG

## 2024-10-09 PROCEDURE — 6370000000 HC RX 637 (ALT 250 FOR IP): Performed by: HOSPITALIST

## 2024-10-09 PROCEDURE — 6360000002 HC RX W HCPCS: Performed by: HOSPITALIST

## 2024-10-09 RX ORDER — FINASTERIDE 5 MG/1
5 TABLET, FILM COATED ORAL DAILY
Status: DISCONTINUED | OUTPATIENT
Start: 2024-10-09 | End: 2024-10-09 | Stop reason: HOSPADM

## 2024-10-09 RX ORDER — MECLIZINE HYDROCHLORIDE 25 MG/1
25 TABLET ORAL 3 TIMES DAILY PRN
Qty: 15 TABLET | Refills: 0 | Status: SHIPPED | OUTPATIENT
Start: 2024-10-09

## 2024-10-09 RX ORDER — AMLODIPINE BESYLATE 5 MG/1
5 TABLET ORAL DAILY
Status: DISCONTINUED | OUTPATIENT
Start: 2024-10-09 | End: 2024-10-09 | Stop reason: HOSPADM

## 2024-10-09 RX ORDER — HYDRALAZINE HYDROCHLORIDE 20 MG/ML
10 INJECTION INTRAMUSCULAR; INTRAVENOUS EVERY 6 HOURS PRN
Status: DISCONTINUED | OUTPATIENT
Start: 2024-10-09 | End: 2024-10-09 | Stop reason: HOSPADM

## 2024-10-09 RX ORDER — MECLIZINE HCL 12.5 MG 12.5 MG/1
25 TABLET ORAL 3 TIMES DAILY PRN
Status: DISCONTINUED | OUTPATIENT
Start: 2024-10-09 | End: 2024-10-09 | Stop reason: HOSPADM

## 2024-10-09 RX ORDER — ATORVASTATIN CALCIUM 20 MG/1
20 TABLET, FILM COATED ORAL NIGHTLY
Status: DISCONTINUED | OUTPATIENT
Start: 2024-10-09 | End: 2024-10-09 | Stop reason: HOSPADM

## 2024-10-09 RX ADMIN — ENOXAPARIN SODIUM 30 MG: 100 INJECTION SUBCUTANEOUS at 09:58

## 2024-10-09 RX ADMIN — TAMSULOSIN HYDROCHLORIDE 0.4 MG: 0.4 CAPSULE ORAL at 09:58

## 2024-10-09 RX ADMIN — FINASTERIDE 5 MG: 5 TABLET, FILM COATED ORAL at 09:58

## 2024-10-09 RX ADMIN — ASPIRIN 325 MG: 325 TABLET ORAL at 09:58

## 2024-10-09 RX ADMIN — CLOPIDOGREL BISULFATE 75 MG: 75 TABLET ORAL at 09:58

## 2024-10-09 RX ADMIN — AMLODIPINE BESYLATE 5 MG: 5 TABLET ORAL at 09:58

## 2024-10-09 ASSESSMENT — PAIN SCALES - GENERAL
PAINLEVEL_OUTOF10: 0
PAINLEVEL_OUTOF10: 0

## 2024-10-09 NOTE — PROGRESS NOTES
Neurocritical Care Code Stroke Documentation      Symptoms:   Pt woke up this morning with expressive aphasia, right facial droop, and reported left arm numbness.  He has a previous history of stroke last year with no focal deficits per EMS.   Last Known Well: 0 pm last night    Medical hx:     Past Medical History:   Diagnosis Date    Frequent headaches     Hypertension     Muscle pain     Neuropathy     Visual disturbance       Anticoagulation: Plavix   VAN:   Negative   NIHSS:   1a-LOC:0    1b-Month/Age:0    1c-Open/Close Hand:0    2-Best Gaze:0    3-Visual Fields:0    4-Facial Palsy:1 (right facial droop)    5a-Left Arm:0    5b-Right Arm:0    6a-Left Le    6b-Right Le    7-Limb Ataxia:0    8-Sensory:1 (left-sided sensory changes)     9-Best Language:0    10-Dysarthria:0    11-Extinction/Inattention:0  TOTAL SCORE:2   Imaging:   CT: No evidence of acute infarct or intracranial hemorrhage.  2. Periventricular white matter disease is likely secondary to chronic small vessel ischemic changes.  3. Chronic bilateral thalamic infarctions.    CTA/CTP:   1. No large vessel occlusion.  2. Perfusion motion limited; no overt perfusion defect.  3. No hemodynamically significant extracranial ICA stenosis (using NASCET criteria).   Plan:   TNK Candidate:NO    Mechanical thrombectomy Candidate: NO     Discussed with Dr. Jamil.     Arrival time: 1252  Time spent: 30 minutes.     ARUN Yanes - NP   
PHYSICAL THERAPY EVALUATION/DISCHARGE    Patient: Bruce Crawford (69 y.o. male)  Date: 10/8/2024  Primary Diagnosis: TIA (transient ischemic attack) [G45.9]       Precautions: General Precautions                      ASSESSMENT AND RECOMMENDATIONS:  Based on the objective data below, the patient presents independent w/ all mobility s/p admission for TIA/ CVA work up d/t facial droop and altered speech.  CT shows no acute infarction, + chronic bilateral infarcts. Pt endorses overall improvement in symptoms, still w/ dizziness which did not limit his mobility or safety today.   He demonstrates normal strength, ROM, and coordination.  He ambulated functional distances w/o DME w/ steady gait, completed hygiene, clothing management and feeding tasks on his own.  At this time, patient presents w/ no acute therapy needs, will sign off.  Please re-consult if needs arise.      Functional Outcome Measure:  The patient scored 54/56 on the Patrick outcome measure which is indicative of low fall risk.          Further skilled acute physical therapy is not indicated at this time.       PLAN :  Recommendation for discharge: (in order for the patient to meet his/her long term goals):   No skilled physical therapy    Other factors to consider for discharge: no additional factors    IF patient discharges home will need the following DME: none       SUBJECTIVE:       OBJECTIVE DATA SUMMARY:     Past Medical History:   Diagnosis Date    Frequent headaches     Hypertension     Muscle pain     Neuropathy     Visual disturbance      Past Surgical History:   Procedure Laterality Date    HERNIA REPAIR      times 2       Home Situation and Prior Level of Function:   Social/Functional History  Lives With: Friend(s)  Type of Home: Apartment ([facility])  Home Layout: Two level, Able to Live on Main level with bedroom/bathroom  Home Access: Stairs to enter without rails  Entrance Stairs - Number of Steps: 
Rounded on Religious patients and provided Anointing of the Sick at request of patient/family.  
Speech LAnguage Pathology EVALUATION/DISCHARGE    Patient: Bruce Crawford (69 y.o. male)  Date: 10/8/2024  Primary Diagnosis: TIA (transient ischemic attack) [G45.9]       Precautions:  General Precautions                  ASSESSMENT :  Patient presents with intact oropharyngeal swallow.  He removed upper dentures for eating however he was able to bite solids.  He demonstrated timely and complete mastication.  No pocketing or oral residue noted.  Patient tolerated thin liquids, puree, and solids without overt s/s aspiration or difficulty.  Given tolerance of diet to date and bedside presentation feel patient is safe to continue regular diet, thin liquids.  Patient demonstrates very mild left facial weakness.  He reports sensation is intact.  Patient is speaking in full fluent sentences and is intelligible at conversation level.  Speech is occasionally imprecise however patient remains fully intelligible.  Patient reports improved in speech intelligibility since onset of symptoms 4 days ago.  Patient is able to express basic wants and needs.      Patient will be discharged from skilled speech-language pathology services at this time.     PLAN :  Recommendations and Planned Interventions:  Diet: Regular and thin liquids  Sit up for meals  Medication as tolerated  No further SLP intervention warranted    Acute SLP Services: No, patient will be discharged from acute skilled speech-language pathology at this time.    Discharge Recommendations: No, additional SLP treatment not indicated at discharge     SUBJECTIVE:   Patient stated, “It's just about all the way back.” of speech intelligibility.  RN approved visit.    Patient reports dizziness, facial droop and speech changes began 4 days ago.  Head CT negative for acute event, MRI pending.    OBJECTIVE:     Past Medical History:   Diagnosis Date    Frequent headaches     Hypertension     Muscle pain     Neuropathy     Visual disturbance      Past Surgical History: 
Spiritual Health History and Assessment/Progress Note  [facility]    Rituals, Rites and Sacraments,  ,  ,      Name: Bruce Crawford MRN: [id number]    Age: 69 y.o.     Sex: male   Language: English   Mu-ism: Bahai   Acute CVA (cerebrovascular accident) (HCC)     Date: 10/9/2024            Total Time Calculated: 5 min              Spiritual Assessment began in [facility] 6S NEURO-SCI TELE        Referral/Consult From: Clergy/   Encounter Overview/Reason: Rituals, Rites and Sacraments  Service Provided For: Patient    Dottie, Belief, Meaning:   Patient is connected with a dottie tradition or spiritual practice  Family/Friends No family/friends present      Importance and Influence:  Patient has spiritual/personal beliefs that influence decisions regarding their health  Family/Friends No family/friends present    Community:  Patient Other: unknown at this time  Family/Friends No family/friends present    Assessment and Plan of Care:     Patient Interventions include: Provided sacramental/Restorationism ritual  Family/Friends Interventions include: No family/friends present    Patient Plan of Care: Spiritual Care available upon further referral  Family/Friends Plan of Care: Spiritual Care available upon further referral    Electronically signed by Chaplain KRISTOPHER on 10/9/2024 at 1:34 PM     Initiative Gaming visit.  Mr. Crawford is Bahai. He was visited today by Fr. Lee and Mr. Crwaford was good with his visit and declined communion today. Assured of continued prayer for his well-being.  He is hoping to be discharged soon,     Sr. JADEN Li, RN, ACSW, LCSW   Page:  [phone]  
Full  Stability at 15 Degree Dorsiflexion: Full  Repeated Dorsiflexion/ Volar Flexion: Full  Circumduction: Full  Wrist Total: 10    Hand  Mass Flexion: Full  Mass Extension: Full  Grasp A: Full  Grasp B: Full  Grasp C: Full  Grasp D: Full  Grasp E: Full  Hand Total: 14    Coordination/Speed  Tremor: None  Dysmetria: None  Time: <1s  Coordination/Speed Total: 6    Total A-D  Total A-D (Motor Function): 66         This is a reliable/valid measure of arm function after a neurological event. It has established value to characterize functional status and for measuring spontaneous and therapy-induced recovery; tests proximal and distal motor functions. Fugl-Kaufman Assessment - UE scores recorded between five and 30 days post neurologic event can be used to predict UE recovery at six months post neurologic event.  Severe = 0-21 points   Moderately Severe = 22-33 points   Moderate = 34-47 points   Mild = 48-66 points  LOUIS Cordova, LEXUS Silva, MILTON Aggarwal, ROBERTO Roa, & MARKY Faust (1992). Measurement of motor recovery after stroke: Outcome assessment and sample size requirements. Stroke, 23, pp. 5982-5507.   --------------------------------------------------------------------------------------------------------------------------------------------------------------------  MCID:  Stroke:   (Ken et al, 2001; n = 171; mean age 70 (11) years; assessed within 17 (12) days of stroke, Acute Stroke)  FMA Motor Scores from Admission to Discharge   10 point increase in FMA Upper Extremity = 1.5 change in discharge FIM   10 point increase in FMA Lower Extremity = 1.9 change in discharge FIM  MDC:   Stroke:   (Maik et al, 2008, n = 14, mean age = 59.9 (14.6) years, assessed on average 14 (6.5) months post stroke, Chronic Stroke)   FMA = 5.2 points for the Upper Extremity portion of the assessment       [facility] AM-PAC \"6 Clicks\"                                                       Daily Activity Inpatient Short 
Oral Daily    clopidogrel (PLAVIX) tablet 75 mg  75 mg Oral Daily    tamsulosin (FLOMAX) capsule 0.4 mg  0.4 mg Oral Daily    lidocaine 4 % external patch 1 patch  1 patch TransDERmal Daily     ______________________________________________________________________  EXPECTED LENGTH OF STAY: 2  ACTUAL LENGTH OF STAY:          1                 Bryan Flores MD    
infusion   IntraVENous PRN    ondansetron (ZOFRAN-ODT) disintegrating tablet 4 mg  4 mg Oral Q8H PRN    Or    ondansetron (ZOFRAN) injection 4 mg  4 mg IntraVENous Q6H PRN    polyethylene glycol (GLYCOLAX) packet 17 g  17 g Oral Daily PRN    enoxaparin Sodium (LOVENOX) injection 30 mg  30 mg SubCUTAneous BID    aspirin suppository 300 mg  300 mg Rectal Daily    aspirin tablet 325 mg  325 mg Oral Daily    atorvastatin (LIPITOR) tablet 80 mg  80 mg Oral Nightly    clopidogrel (PLAVIX) tablet 75 mg  75 mg Oral Daily    tamsulosin (FLOMAX) capsule 0.4 mg  0.4 mg Oral Daily    lidocaine 4 % external patch 1 patch  1 patch TransDERmal Daily     Current Outpatient Medications   Medication Sig    predniSONE 10 MG (21) TBPK Follow instructions on 6 day dose pack    acetaminophen (TYLENOL) 325 MG tablet Take 2 tablets by mouth every 6 hours as needed for Pain    aspirin 325 MG tablet Take 1 tablet by mouth daily    fluticasone-umeclidin-vilant (TRELEGY ELLIPTA) 100-62.5-25 MCG/ACT AEPB inhaler Inhale 1 puff into the lungs daily (Patient taking differently: Inhale 1 puff into the lungs 3 times daily)    atorvastatin (LIPITOR) 80 MG tablet Take 1 tablet by mouth nightly    amLODIPine (NORVASC) 5 MG tablet Take 1 tablet by mouth daily    tamsulosin (FLOMAX) 0.4 MG capsule Take 1 capsule by mouth daily    clopidogrel (PLAVIX) 75 MG tablet Take 1 tablet by mouth daily    butalbital-acetaminophen-caffeine (FIORICET, ESGIC) -40 MG per tablet Take 1 tablet by mouth every 4 hours as needed for Headaches    meclizine (ANTIVERT) 25 MG tablet Take 1 tablet by mouth 3 times daily as needed for Dizziness    ondansetron (ZOFRAN-ODT) 4 MG disintegrating tablet Take 1 tablet by mouth every 8 hours as needed for Nausea or Vomiting    gabapentin (NEURONTIN) 100 MG capsule Take 2 capsules by mouth 2 times daily for 5 days. Intended supply: 90 days Max Daily Amount: 400 mg    finasteride (PROSCAR) 5 MG tablet Take 1 tablet by mouth daily

## 2024-10-09 NOTE — DISCHARGE INSTRUCTIONS
Discharge Instructions       PATIENT ID: Bruce Crawford  MRN: [id number]   YOB: 1955    DATE OF ADMISSION: [unfilled]    DATE OF DISCHARGE: 10/9/2024    PRIMARY CARE PROVIDER: @PCP@     ATTENDING PHYSICIAN: [unfilled]  DISCHARGING PROVIDER: Bryan Flores MD    To contact this individual call [phone] and ask the  to page.   If unavailable ask to be transferred the Adult Hospitalist Department.    DISCHARGE DIAGNOSES Dizziness    CONSULTATIONS: Neurology    PROCEDURES/SURGERIES: * No surgery found *    PENDING TEST RESULTS:   At the time of discharge the following test results are still pending: none    FOLLOW UP APPOINTMENTS:   PCP    ADDITIONAL CARE RECOMMENDATIONS: as above    DIET: cardiac diet    ACTIVITY: activity as tolerated      DISCHARGE MEDICATIONS:   See Medication Reconciliation Form    It is important that you take the medication exactly as they are prescribed.   Keep your medication in the bottles provided by the pharmacist and keep a list of the medication names, dosages, and times to be taken in your wallet.   Do not take other medications without consulting your doctor.       NOTIFY YOUR PHYSICIAN FOR ANY OF THE FOLLOWING:   Fever over 101 degrees for 24 hours.   Chest pain, shortness of breath, fever, chills, nausea, vomiting, diarrhea, change in mentation, falling, weakness, bleeding. Severe pain or pain not relieved by medications.  Or, any other signs or symptoms that you may have questions about.      DISPOSITION:  x  Home With:   OT  PT  HH  RN       SNF/Inpatient Rehab/LTAC    Independent/assisted living    Hospice    Other:     CDMP Checked:   Yes x     PROBLEM LIST Updated:  Yes x       Signed:   Bryan Flores MD  10/9/2024  10:09 AM    DR. Stephanie Joyce

## 2024-10-09 NOTE — PLAN OF CARE
Problem: Chronic Conditions and Co-morbidities  Goal: Patient's chronic conditions and co-morbidity symptoms are monitored and maintained or improved  10/9/2024 0130 by Bessie Ndiaye RN  Outcome: Progressing  Flowsheets (Taken 10/9/2024 0000)  Care Plan - Patient's Chronic Conditions and Co-Morbidity Symptoms are Monitored and Maintained or Improved: Monitor and assess patient's chronic conditions and comorbid symptoms for stability, deterioration, or improvement  10/8/2024 2306 by Severson, Constance M, RN  Outcome: Progressing  10/8/2024 2027 by Briana Mota RN  Outcome: Progressing  Flowsheets  Taken 10/8/2024 2000 by Severson, Constance M, RN  Care Plan - Patient's Chronic Conditions and Co-Morbidity Symptoms are Monitored and Maintained or Improved: Monitor and assess patient's chronic conditions and comorbid symptoms for stability, deterioration, or improvement  Taken 10/8/2024 1600 by Briana Mota RN  Care Plan - Patient's Chronic Conditions and Co-Morbidity Symptoms are Monitored and Maintained or Improved:   Monitor and assess patient's chronic conditions and comorbid symptoms for stability, deterioration, or improvement   Collaborate with multidisciplinary team to address chronic and comorbid conditions and prevent exacerbation or deterioration   Update acute care plan with appropriate goals if chronic or comorbid symptoms are exacerbated and prevent overall improvement and discharge     Problem: Discharge Planning  Goal: Discharge to home or other facility with appropriate resources  10/9/2024 0130 by Bessie Ndiaye RN  Outcome: Progressing  Flowsheets (Taken 10/9/2024 0000)  Discharge to home or other facility with appropriate resources: Identify barriers to discharge with patient and caregiver  10/8/2024 2306 by Severson, Constance M, RN  Outcome: Progressing  10/8/2024 2027 by Briana Mota RN  Outcome: Progressing  Flowsheets  Taken 10/8/2024 2000 by Severson, Constance M,

## 2024-10-09 NOTE — PLAN OF CARE
I have reviewed discharge paperwork and medication with pt. Pt will be leaving via uber @ 1400 to the [facility] rehab center with medications picked up from pharmacy @ [facility]. Opportunities for questions provided     Stroke Education provided to patient and the following topics were discussed    1. Patients personal risk factors for stroke are carotid stenosis, hyperlipidemia, and hypertension    2. Warning signs of Stroke:        * Sudden numbness or weakness of the face, arm or leg, especially on one side of          The body            * Sudden confusion, trouble speaking or understanding        * Sudden trouble seeing in one or both eyes        * Sudden trouble walking, dizziness, loss of balance or coordination        * Sudden severe headache with no known cause      3. Importance of activation Emergency Medical Services ( 9-1-1 ) immediately if experience any warning signs of stroke.    4. Be sure and schedule a follow-up appointment with your primary care doctor or any specialists as instructed.     5. You must take medicine every day to treat your risk factors for stroke.  Be sure to take your medicines exactly as your doctor tells you: no more, no less.  Know what your medicines are for , what they do.  Anti-thrombotics /anticoagulants can help prevent strokes.  You are taking the following medicine(s)  ASA, Plavix,     6.  Smoking and second-hand smoke greatly increase your risk of stroke, cardiovascular disease and death. Smoking history Unknown    7. Information provided was [facility] Stroke Education Binder, Stroke Handouts, or Verbal Education    8. Documentation of teaching completed in Patient Education Activity and on Care Plan with teaching response noted?  yes     Problem: Chronic Conditions and Co-morbidities  Goal: Patient's chronic conditions and co-morbidity symptoms are monitored and maintained or improved  10/9/2024 1149 by Mikayla Hilton, RN  Outcome: Adequate for

## 2024-10-09 NOTE — PLAN OF CARE
Problem: Chronic Conditions and Co-morbidities  Goal: Patient's chronic conditions and co-morbidity symptoms are monitored and maintained or improved  Outcome: Progressing  Flowsheets (Taken 10/8/2024 1600)  Care Plan - Patient's Chronic Conditions and Co-Morbidity Symptoms are Monitored and Maintained or Improved:   Monitor and assess patient's chronic conditions and comorbid symptoms for stability, deterioration, or improvement   Collaborate with multidisciplinary team to address chronic and comorbid conditions and prevent exacerbation or deterioration   Update acute care plan with appropriate goals if chronic or comorbid symptoms are exacerbated and prevent overall improvement and discharge     Problem: Discharge Planning  Goal: Discharge to home or other facility with appropriate resources  Outcome: Progressing  Flowsheets (Taken 10/8/2024 1600)  Discharge to home or other facility with appropriate resources:   Identify barriers to discharge with patient and caregiver   Identify discharge learning needs (meds, wound care, etc)   Arrange for needed discharge resources and transportation as appropriate   Arrange for interpreters to assist at discharge as needed   Refer to discharge planning if patient needs post-hospital services based on physician order or complex needs related to functional status, cognitive ability or social support system     Problem: Safety - Adult  Goal: Free from fall injury  Outcome: Progressing

## 2024-10-09 NOTE — CARE COORDINATION
Patient expects to be discharged to: Other (comment)  (SA program)   Services At/After Discharge   Mode of Transport at Discharge   ([employer] transportation)     AIXA Ordonez

## 2024-10-09 NOTE — DISCHARGE SUMMARY
Discharge Summary       PATIENT ID: Bruce Crawford  MRN: [id number]   YOB: 1955    DATE OF ADMISSION: 10/7/2024  1:03 PM    DATE OF DISCHARGE: 10/9/2024   PRIMARY CARE PROVIDER: Femi Barbour MD     ATTENDING PHYSICIAN: Dr Bryan Flores  DISCHARGING PROVIDER: Bryan Flores MD    To contact this individual call [phone] and ask the  to page.  If unavailable ask to be transferred the Adult Hospitalist Department.    CONSULTATIONS: IP CONSULT TO TELE-NEUROLOGY  IP CONSULT TO NEUROLOGY  IP CONSULT TO PHARMACY    PROCEDURES/SURGERIES: * No surgery found *    ADMITTING DIAGNOSES & HOSPITAL COURSE:   Possible CVA versus TIA  Hx of  bilateral thalamic and left lacunar infarcts  - CT head negative for acute infarct or ICH  - CT brain perfusion and CTA head/neck: no LVO, no ICA stenosis  - Echo unremarkable  - refusing to get MRI brain  - Appreciate Neurology  - Aspirin and statin and continue Plavix as well  -Dizziness likely sec to BPPV       HTN - restart home norvasc today    BPH - continue Flomax/Proscar  CKD stage III: outpatient follow up  Hx Substance Use (crack cocaine) - UDS negative      PENDING TEST RESULTS:   At the time of discharge the following test results are still pending: none    FOLLOW UP APPOINTMENTS:    PCP    ADDITIONAL CARE RECOMMENDATIONS: as above    DIET: cardiac diet    ACTIVITY: activity as tolerated      DISCHARGE MEDICATIONS:     Medication List        CONTINUE taking these medications      acetaminophen 325 MG tablet  Commonly known as: TYLENOL  Take 2 tablets by mouth every 6 hours as needed for Pain     amLODIPine 5 MG tablet  Commonly known as: NORVASC  Take 1 tablet by mouth daily     aspirin 325 MG tablet  Take 1 tablet by mouth daily     atorvastatin 80 MG tablet  Commonly known as: LIPITOR  Take 1 tablet by mouth nightly     butalbital-acetaminophen-caffeine -40 MG per tablet  Commonly known as: FIORICET, ESGIC  Take 1 tablet by mouth every 4 hours

## 2024-10-09 NOTE — PLAN OF CARE
Problem: Chronic Conditions and Co-morbidities  Goal: Patient's chronic conditions and co-morbidity symptoms are monitored and maintained or improved  10/8/2024 2306 by Severson, Constance M, RN  Outcome: Progressing  10/8/2024 2027 by Briana Mota RN  Outcome: Progressing  Flowsheets  Taken 10/8/2024 2000 by Severson, Constance M, RN  Care Plan - Patient's Chronic Conditions and Co-Morbidity Symptoms are Monitored and Maintained or Improved: Monitor and assess patient's chronic conditions and comorbid symptoms for stability, deterioration, or improvement  Taken 10/8/2024 1600 by Briana Mota RN  Care Plan - Patient's Chronic Conditions and Co-Morbidity Symptoms are Monitored and Maintained or Improved:   Monitor and assess patient's chronic conditions and comorbid symptoms for stability, deterioration, or improvement   Collaborate with multidisciplinary team to address chronic and comorbid conditions and prevent exacerbation or deterioration   Update acute care plan with appropriate goals if chronic or comorbid symptoms are exacerbated and prevent overall improvement and discharge     Problem: Discharge Planning  Goal: Discharge to home or other facility with appropriate resources  10/8/2024 2306 by Severson, Constance M, RN  Outcome: Progressing  10/8/2024 2027 by Briana Mota RN  Outcome: Progressing  Flowsheets  Taken 10/8/2024 2000 by Severson, Constance M, RN  Discharge to home or other facility with appropriate resources: Identify barriers to discharge with patient and caregiver  Taken 10/8/2024 1600 by Briana Mota RN  Discharge to home or other facility with appropriate resources:   Identify barriers to discharge with patient and caregiver   Identify discharge learning needs (meds, wound care, etc)   Arrange for needed discharge resources and transportation as appropriate   Arrange for interpreters to assist at discharge as needed   Refer to discharge planning if patient needs

## 2024-10-11 ENCOUNTER — APPOINTMENT (OUTPATIENT)
Facility: HOSPITAL | Age: 69
End: 2024-10-11
Payer: MEDICARE

## 2024-10-11 ENCOUNTER — HOSPITAL ENCOUNTER (EMERGENCY)
Facility: HOSPITAL | Age: 69
Discharge: HOME | End: 2024-10-11
Payer: MEDICARE

## 2024-10-11 VITALS
SYSTOLIC BLOOD PRESSURE: 160 MMHG | RESPIRATION RATE: 18 BRPM | DIASTOLIC BLOOD PRESSURE: 133 MMHG | TEMPERATURE: 98.2 F | OXYGEN SATURATION: 95 % | HEART RATE: 94 BPM

## 2024-10-11 DIAGNOSIS — J44.9 CHRONIC OBSTRUCTIVE PULMONARY DISEASE, UNSPECIFIED COPD TYPE (HCC): Primary | ICD-10-CM

## 2024-10-11 DIAGNOSIS — R06.02 SHORTNESS OF BREATH: ICD-10-CM

## 2024-10-11 LAB
ALBUMIN SERPL-MCNC: 3.5 G/DL (ref 3.5–5)
ALBUMIN/GLOB SERPL: 1 (ref 1.1–2.2)
ALP SERPL-CCNC: 86 U/L (ref 45–117)
ALT SERPL-CCNC: 50 U/L (ref 12–78)
ANION GAP SERPL CALC-SCNC: 6 MMOL/L (ref 2–12)
AST SERPL-CCNC: 36 U/L (ref 15–37)
BASOPHILS # BLD: 0.1 K/UL (ref 0–0.1)
BASOPHILS NFR BLD: 1 % (ref 0–1)
BILIRUB SERPL-MCNC: 0.6 MG/DL (ref 0.2–1)
BUN SERPL-MCNC: 18 MG/DL (ref 6–20)
BUN/CREAT SERPL: 12 (ref 12–20)
CALCIUM SERPL-MCNC: 9.4 MG/DL (ref 8.5–10.1)
CHLORIDE SERPL-SCNC: 110 MMOL/L (ref 97–108)
CO2 SERPL-SCNC: 28 MMOL/L (ref 21–32)
CREAT SERPL-MCNC: 1.54 MG/DL (ref 0.7–1.3)
D DIMER PPP FEU-MCNC: 0.25 MG/L FEU (ref 0–0.65)
DIFFERENTIAL METHOD BLD: NORMAL
EOSINOPHIL # BLD: 0.2 K/UL (ref 0–0.4)
EOSINOPHIL NFR BLD: 3 % (ref 0–7)
ERYTHROCYTE [DISTWIDTH] IN BLOOD BY AUTOMATED COUNT: 13.3 % (ref 11.5–14.5)
FLUAV RNA SPEC QL NAA+PROBE: NOT DETECTED
FLUBV RNA SPEC QL NAA+PROBE: NOT DETECTED
GLOBULIN SER CALC-MCNC: 3.4 G/DL (ref 2–4)
GLUCOSE SERPL-MCNC: 135 MG/DL (ref 65–100)
HCT VFR BLD AUTO: 40.7 % (ref 36.6–50.3)
HGB BLD-MCNC: 13.9 G/DL (ref 12.1–17)
IMM GRANULOCYTES # BLD AUTO: 0 K/UL (ref 0–0.04)
IMM GRANULOCYTES NFR BLD AUTO: 0 % (ref 0–0.5)
LYMPHOCYTES # BLD: 1.5 K/UL (ref 0.8–3.5)
LYMPHOCYTES NFR BLD: 18 % (ref 12–49)
MCH RBC QN AUTO: 30.8 PG (ref 26–34)
MCHC RBC AUTO-ENTMCNC: 34.2 G/DL (ref 30–36.5)
MCV RBC AUTO: 90.2 FL (ref 80–99)
MONOCYTES # BLD: 0.7 K/UL (ref 0–1)
MONOCYTES NFR BLD: 9 % (ref 5–13)
NEUTS SEG # BLD: 5.6 K/UL (ref 1.8–8)
NEUTS SEG NFR BLD: 69 % (ref 32–75)
NRBC # BLD: 0 K/UL (ref 0–0.01)
NRBC BLD-RTO: 0 PER 100 WBC
PLATELET # BLD AUTO: 291 K/UL (ref 150–400)
PMV BLD AUTO: 9.2 FL (ref 8.9–12.9)
POTASSIUM SERPL-SCNC: 3.7 MMOL/L (ref 3.5–5.1)
PROT SERPL-MCNC: 6.9 G/DL (ref 6.4–8.2)
RBC # BLD AUTO: 4.51 M/UL (ref 4.1–5.7)
SARS-COV-2 RNA RESP QL NAA+PROBE: NOT DETECTED
SODIUM SERPL-SCNC: 144 MMOL/L (ref 136–145)
SOURCE: NORMAL
TROPONIN I SERPL HS-MCNC: 12 NG/L (ref 0–76)
TROPONIN I SERPL HS-MCNC: 13 NG/L (ref 0–76)
WBC # BLD AUTO: 8.2 K/UL (ref 4.1–11.1)

## 2024-10-11 PROCEDURE — 6370000000 HC RX 637 (ALT 250 FOR IP): Performed by: PHYSICIAN ASSISTANT

## 2024-10-11 PROCEDURE — 85025 COMPLETE CBC W/AUTO DIFF WBC: CPT

## 2024-10-11 PROCEDURE — 2580000003 HC RX 258: Performed by: PHYSICIAN ASSISTANT

## 2024-10-11 PROCEDURE — 6360000002 HC RX W HCPCS: Performed by: PHYSICIAN ASSISTANT

## 2024-10-11 PROCEDURE — 36415 COLL VENOUS BLD VENIPUNCTURE: CPT

## 2024-10-11 PROCEDURE — 94640 AIRWAY INHALATION TREATMENT: CPT

## 2024-10-11 PROCEDURE — 80053 COMPREHEN METABOLIC PANEL: CPT

## 2024-10-11 PROCEDURE — 84484 ASSAY OF TROPONIN QUANT: CPT

## 2024-10-11 PROCEDURE — 85379 FIBRIN DEGRADATION QUANT: CPT

## 2024-10-11 PROCEDURE — 93005 ELECTROCARDIOGRAM TRACING: CPT | Performed by: PHYSICIAN ASSISTANT

## 2024-10-11 PROCEDURE — 99285 EMERGENCY DEPT VISIT HI MDM: CPT

## 2024-10-11 PROCEDURE — 71045 X-RAY EXAM CHEST 1 VIEW: CPT

## 2024-10-11 PROCEDURE — 87636 SARSCOV2 & INF A&B AMP PRB: CPT

## 2024-10-11 PROCEDURE — 96374 THER/PROPH/DIAG INJ IV PUSH: CPT

## 2024-10-11 PROCEDURE — 96375 TX/PRO/DX INJ NEW DRUG ADDON: CPT

## 2024-10-11 RX ORDER — AZITHROMYCIN 250 MG/1
TABLET, FILM COATED ORAL
Qty: 6 TABLET | Refills: 0 | Status: SHIPPED | OUTPATIENT
Start: 2024-10-11 | End: 2024-10-12

## 2024-10-11 RX ORDER — PREDNISONE 10 MG/1
TABLET ORAL
Qty: 15 TABLET | Refills: 0 | Status: SHIPPED | OUTPATIENT
Start: 2024-10-11 | End: 2024-10-12

## 2024-10-11 RX ORDER — IPRATROPIUM BROMIDE AND ALBUTEROL SULFATE 2.5; .5 MG/3ML; MG/3ML
1 SOLUTION RESPIRATORY (INHALATION) EVERY 4 HOURS
Qty: 360 ML | Refills: 0 | Status: SHIPPED | OUTPATIENT
Start: 2024-10-11 | End: 2024-10-12

## 2024-10-11 RX ORDER — MORPHINE SULFATE 4 MG/ML
4 INJECTION, SOLUTION INTRAMUSCULAR; INTRAVENOUS
Status: COMPLETED | OUTPATIENT
Start: 2024-10-11 | End: 2024-10-11

## 2024-10-11 RX ORDER — IPRATROPIUM BROMIDE AND ALBUTEROL SULFATE 2.5; .5 MG/3ML; MG/3ML
1 SOLUTION RESPIRATORY (INHALATION)
Status: COMPLETED | OUTPATIENT
Start: 2024-10-11 | End: 2024-10-11

## 2024-10-11 RX ORDER — ACETAMINOPHEN 500 MG
1000 TABLET ORAL
Status: COMPLETED | OUTPATIENT
Start: 2024-10-11 | End: 2024-10-11

## 2024-10-11 RX ADMIN — IPRATROPIUM BROMIDE AND ALBUTEROL SULFATE 1 DOSE: .5; 3 SOLUTION RESPIRATORY (INHALATION) at 19:43

## 2024-10-11 RX ADMIN — MORPHINE SULFATE 4 MG: 4 INJECTION, SOLUTION INTRAMUSCULAR; INTRAVENOUS at 19:59

## 2024-10-11 RX ADMIN — ACETAMINOPHEN 1000 MG: 500 TABLET ORAL at 19:58

## 2024-10-11 RX ADMIN — WATER 125 MG: 1 INJECTION INTRAMUSCULAR; INTRAVENOUS; SUBCUTANEOUS at 19:15

## 2024-10-12 LAB
EKG ATRIAL RATE: 91 BPM
EKG DIAGNOSIS: NORMAL
EKG P AXIS: 5 DEGREES
EKG P-R INTERVAL: 142 MS
EKG Q-T INTERVAL: 386 MS
EKG QRS DURATION: 104 MS
EKG QTC CALCULATION (BAZETT): 474 MS
EKG R AXIS: -58 DEGREES
EKG T AXIS: 34 DEGREES
EKG VENTRICULAR RATE: 91 BPM

## 2024-10-12 RX ORDER — PREDNISONE 10 MG/1
TABLET ORAL
Qty: 15 TABLET | Refills: 0 | Status: SHIPPED | OUTPATIENT
Start: 2024-10-12

## 2024-10-12 RX ORDER — IPRATROPIUM BROMIDE AND ALBUTEROL SULFATE 2.5; .5 MG/3ML; MG/3ML
1 SOLUTION RESPIRATORY (INHALATION) EVERY 4 HOURS
Qty: 360 ML | Refills: 0 | Status: SHIPPED | OUTPATIENT
Start: 2024-10-12

## 2024-10-12 RX ORDER — AZITHROMYCIN 250 MG/1
TABLET, FILM COATED ORAL
Qty: 6 TABLET | Refills: 0 | Status: SHIPPED | OUTPATIENT
Start: 2024-10-12 | End: 2024-10-22

## 2024-10-12 NOTE — ED PROVIDER NOTES
[facility] EMERGENCY DEPT  EMERGENCY DEPARTMENT ENCOUNTER       Pt Name: Bruce Crawford  MRN: [id number]  Birthdate 1955  Date of Evaluation: 10/11/2024  Provider: Angelica Duarte PA-C   PCP: Femi Barbour MD  Note Started: 10:12 PM 10/11/24     CHIEF COMPLAINT       Chief Complaint   Patient presents with    Chest Pain     BIBEMS, CP started last night with SOB. History of COPD, gave 324 ASA        HISTORY OF PRESENT ILLNESS: 1 or more elements      History From: Patient  None     Bruce Crawford is a 69 y.o. male who presents to the ED today concerns of shortness of breath.  History of COPD.  The patient states that he is currently in a group rehab facility.  Reports that he does not have his nebulizer and needs one for while at the facility.  He states that he is on Trelegy as well however sounds as though he does not have this as well.  Patient has had a productive cough.  States that he has pain on each side of his chest.  No noted fevers.  Presents here for further evaluation     Nursing Notes were all reviewed and agreed with or any disagreements were addressed in the HPI.     REVIEW OF SYSTEMS      Review of Systems     Positives and Pertinent negatives as per HPI.    PAST HISTORY     Past Medical History:  Past Medical History:   Diagnosis Date    Cerebral artery occlusion with cerebral infarction (HCC)     COPD (chronic obstructive pulmonary disease) (HCC)     Crack cocaine use     Fentanyl dependence (HCC)     Frequent headaches     Hypertension     Muscle pain     Neuropathy     Sleep apnea     Visual disturbance        Past Surgical History:  Past Surgical History:   Procedure Laterality Date    HERNIA REPAIR      times 2       Family History:  Family History   Problem Relation Age of Onset    Neuropathy Father     Stroke Mother     Heart Disease Father        Social History:  Social History     Tobacco Use    Smoking status: Former     Current packs/day: 0.25     Average packs/day: 0.3  help him.  Therefore we will send him with a prescription of this.  He states that he send if [employer] at this time and does not have a nebulizer however he does have an albuterol inhaler.  I will write a DME order for him to have a nebulizer and send him with a prescription of DuoNeb vials.  Of course worsening of symptoms he can return here    ED Course as of 10/11/24 2212   Fri Oct 11, 2024   1826 EKG is interpreted by me as sinus rhythm, heart rate 91, left axis deviation, normal intervals, no ST changes [WB]      ED Course User Index  [WB] Tushar Ochoa MD         FINAL IMPRESSION     1. Chronic obstructive pulmonary disease, unspecified COPD type (HCC)    2. Shortness of breath          DISPOSITION/PLAN   DISPOSITION Decision To Discharge 10/11/2024 10:08:53 PM  Condition at Disposition: Data Unavailable      Discharge Note: The patient is stable for discharge home. The signs, symptoms, diagnosis, and discharge instructions have been discussed, understanding conveyed, and agreed upon. The patient is to follow up as recommended or return to ER should their symptoms worsen.      PATIENT REFERRED TO:  [facility] EMERGENCY DEPT  [address] [address]  [phone]    If symptoms worsen    Femi Barbour MD  [facility] [address] [address] [phone]    In 1 week         DISCHARGE MEDICATIONS:     Medication List        START taking these medications      azithromycin 250 MG tablet  Commonly known as: ZITHROMAX  500mg on day 1 followed by 250mg on days 2 - 5     ipratropium 0.5 mg-albuterol 2.5 mg 0.5-2.5 (3) MG/3ML Soln nebulizer solution  Commonly known as: DUONEB  Inhale 3 mLs into the lungs every 4 hours     predniSONE 10 MG tablet  Commonly known as: DELTASONE  50 mg PO once a day, 40 mg PO once a day, 30 mg PO once a day, 20 mg PO once a day, 10 mg PO once a day. Start tomorrow            ASK your doctor about these medications      acetaminophen 325 MG

## 2024-10-27 ENCOUNTER — HOSPITAL ENCOUNTER (EMERGENCY)
Facility: HOSPITAL | Age: 69
Discharge: HOME | End: 2024-10-27
Attending: STUDENT IN AN ORGANIZED HEALTH CARE EDUCATION/TRAINING PROGRAM
Payer: MEDICARE

## 2024-10-27 ENCOUNTER — APPOINTMENT (OUTPATIENT)
Facility: HOSPITAL | Age: 69
End: 2024-10-27
Payer: MEDICARE

## 2024-10-27 VITALS
RESPIRATION RATE: 14 BRPM | DIASTOLIC BLOOD PRESSURE: 93 MMHG | BODY MASS INDEX: 34.4 KG/M2 | TEMPERATURE: 97.6 F | WEIGHT: 254 LBS | OXYGEN SATURATION: 97 % | SYSTOLIC BLOOD PRESSURE: 157 MMHG | HEIGHT: 72 IN | HEART RATE: 70 BPM

## 2024-10-27 DIAGNOSIS — M79.674 TOE PAIN, RIGHT: Primary | ICD-10-CM

## 2024-10-27 PROCEDURE — 99283 EMERGENCY DEPT VISIT LOW MDM: CPT

## 2024-10-27 PROCEDURE — 6370000000 HC RX 637 (ALT 250 FOR IP): Performed by: EMERGENCY MEDICINE

## 2024-10-27 PROCEDURE — 73660 X-RAY EXAM OF TOE(S): CPT

## 2024-10-27 RX ORDER — ACETAMINOPHEN 500 MG
1000 TABLET ORAL
Status: COMPLETED | OUTPATIENT
Start: 2024-10-27 | End: 2024-10-27

## 2024-10-27 RX ADMIN — ACETAMINOPHEN 1000 MG: 500 TABLET ORAL at 18:35

## 2024-10-27 ASSESSMENT — PAIN SCALES - GENERAL
PAINLEVEL_OUTOF10: 10
PAINLEVEL_OUTOF10: 10

## 2024-10-27 ASSESSMENT — PAIN DESCRIPTION - LOCATION: LOCATION: TOE (COMMENT WHICH ONE)

## 2024-10-27 ASSESSMENT — PAIN DESCRIPTION - ORIENTATION: ORIENTATION: RIGHT

## 2024-10-27 ASSESSMENT — PAIN DESCRIPTION - DESCRIPTORS: DESCRIPTORS: ACHING

## 2024-10-27 ASSESSMENT — PAIN - FUNCTIONAL ASSESSMENT: PAIN_FUNCTIONAL_ASSESSMENT: ACTIVITIES ARE NOT PREVENTED

## 2024-10-27 NOTE — ED PROVIDER NOTES
COPD (chronic obstructive pulmonary disease) ([facility])     Crack cocaine use     Fentanyl dependence ([facility])     Frequent headaches     Hypertension     Muscle pain     Neuropathy     Sleep apnea     Visual disturbance        SURGICAL HISTORY       Past Surgical History:   Procedure Laterality Date    HERNIA REPAIR      times 2       CURRENT MEDICATIONS       Previous Medications    ACETAMINOPHEN (TYLENOL) 325 MG TABLET    Take 2 tablets by mouth every 6 hours as needed for Pain    ALBUTEROL SULFATE HFA (VENTOLIN HFA) 108 (90 BASE) MCG/ACT INHALER    Inhale 2 puffs into the lungs every 4 hours as needed for Wheezing    AMLODIPINE (NORVASC) 5 MG TABLET    Take 1 tablet by mouth daily    ASPIRIN 325 MG TABLET    Take 1 tablet by mouth daily    ATORVASTATIN (LIPITOR) 80 MG TABLET    Take 1 tablet by mouth nightly    BUTALBITAL-ACETAMINOPHEN-CAFFEINE (FIORICET, ESGIC) -40 MG PER TABLET    Take 1 tablet by mouth every 4 hours as needed for Headaches    CLOPIDOGREL (PLAVIX) 75 MG TABLET    Take 1 tablet by mouth daily    FINASTERIDE (PROSCAR) 5 MG TABLET    Take 1 tablet by mouth daily    FLUTICASONE-UMECLIDIN-VILANT (TRELEGY ELLIPTA) 100-62.5-25 MCG/ACT AEPB INHALER    Inhale 1 puff into the lungs daily    IPRATROPIUM 0.5 MG-ALBUTEROL 2.5 MG (DUONEB) 0.5-2.5 (3) MG/3ML SOLN NEBULIZER SOLUTION    Inhale 3 mLs into the lungs every 4 hours    MECLIZINE (ANTIVERT) 25 MG TABLET    Take 1 tablet by mouth 3 times daily as needed for Dizziness    ONDANSETRON (ZOFRAN-ODT) 4 MG DISINTEGRATING TABLET    Take 1 tablet by mouth every 8 hours as needed for Nausea or Vomiting    PREDNISONE (DELTASONE) 10 MG TABLET    50 mg PO once a day, 40 mg PO once a day, 30 mg PO once a day, 20 mg PO once a day, 10 mg PO once a day. Start tomorrow    TAMSULOSIN (FLOMAX) 0.4 MG CAPSULE    Take 1 capsule by mouth daily       ALLERGIES     Patient has no known allergies.    FAMILY HISTORY       Family History   Problem Relation Age of Onset

## 2024-10-27 NOTE — ED NOTES
BIBEMS c/o pain to R 3rd toe pain after running last night in shoes that were smaller than his normal size. Pt states other toes feel fine. Pt states pain is only present when bearing weight on toe. Able to stand and pivot from EMS stretcher.

## 2024-10-28 ASSESSMENT — ENCOUNTER SYMPTOMS
ABDOMINAL PAIN: 0
BACK PAIN: 0
COUGH: 0
VOMITING: 0
COLOR CHANGE: 0
SORE THROAT: 0
SHORTNESS OF BREATH: 0
DIARRHEA: 0

## 2025-01-16 ENCOUNTER — HOSPITAL ENCOUNTER (EMERGENCY)
Facility: HOSPITAL | Age: 70
Discharge: HOME | End: 2025-01-16
Attending: EMERGENCY MEDICINE
Payer: MEDICARE

## 2025-01-16 VITALS
WEIGHT: 268.3 LBS | RESPIRATION RATE: 20 BRPM | BODY MASS INDEX: 36.34 KG/M2 | OXYGEN SATURATION: 96 % | TEMPERATURE: 97.9 F | HEIGHT: 72 IN | DIASTOLIC BLOOD PRESSURE: 93 MMHG | HEART RATE: 81 BPM | SYSTOLIC BLOOD PRESSURE: 138 MMHG

## 2025-01-16 DIAGNOSIS — U07.1 COVID-19 VIRUS INFECTION: ICD-10-CM

## 2025-01-16 DIAGNOSIS — R04.0 BLEEDING FROM THE NOSE: Primary | ICD-10-CM

## 2025-01-16 LAB
FLUAV RNA SPEC QL NAA+PROBE: NOT DETECTED
FLUBV RNA SPEC QL NAA+PROBE: NOT DETECTED
SARS-COV-2 RNA RESP QL NAA+PROBE: DETECTED
SOURCE: ABNORMAL

## 2025-01-16 PROCEDURE — 87636 SARSCOV2 & INF A&B AMP PRB: CPT

## 2025-01-16 PROCEDURE — 99283 EMERGENCY DEPT VISIT LOW MDM: CPT

## 2025-01-16 ASSESSMENT — ENCOUNTER SYMPTOMS
RHINORRHEA: 1
NAUSEA: 0
COLOR CHANGE: 0
SHORTNESS OF BREATH: 0
BACK PAIN: 0
ABDOMINAL PAIN: 0
TROUBLE SWALLOWING: 0
VOMITING: 0
COUGH: 0

## 2025-01-16 ASSESSMENT — PAIN - FUNCTIONAL ASSESSMENT: PAIN_FUNCTIONAL_ASSESSMENT: NONE - DENIES PAIN

## 2025-01-16 NOTE — ED PROVIDER NOTES
mis-transcribed.)    ARUN Reyna NP (electronically signed)  Emergency Attending Physician / Physician Assistant / Nurse Practitioner             Rashmi Lombardo APRN - NP  01/16/25 4127

## 2025-01-16 NOTE — ED TRIAGE NOTES
Patient is coming in with complaints of nose bleed from the [facility] to the left nostril that lasted about 2 minutes. Patient has recent COVID at [place]. Denies chest pain and SOB.

## 2025-01-16 NOTE — DISCHARGE INSTRUCTIONS
Throw out your toothbrush and start fresh with a new toothbrush.  Increase your fluids to 8 ounces every hour that you are awake; include salty liquids such as soups, broths, seltzer water and Gatorade alternating with water.  Alternate Motrin and Tylenol as needed for any fever or bodyaches.  Close follow-up with your doctor if symptoms persist.

## 2025-02-25 ENCOUNTER — HOSPITAL ENCOUNTER (OUTPATIENT)
Facility: HOSPITAL | Age: 70
Discharge: HOME | End: 2025-02-28
Payer: MEDICARE

## 2025-02-25 DIAGNOSIS — Z87.891 PERSONAL HISTORY OF TOBACCO USE: ICD-10-CM

## 2025-02-25 DIAGNOSIS — Z87.891 FORMER SMOKER: ICD-10-CM

## 2025-02-25 PROCEDURE — 71271 CT THORAX LUNG CANCER SCR C-: CPT

## 2025-07-01 ENCOUNTER — HOSPITAL ENCOUNTER (EMERGENCY)
Facility: HOSPITAL | Age: 70
Discharge: HOME | End: 2025-07-01
Attending: STUDENT IN AN ORGANIZED HEALTH CARE EDUCATION/TRAINING PROGRAM
Payer: MEDICARE

## 2025-07-01 ENCOUNTER — APPOINTMENT (OUTPATIENT)
Facility: HOSPITAL | Age: 70
End: 2025-07-01
Payer: MEDICARE

## 2025-07-01 VITALS
DIASTOLIC BLOOD PRESSURE: 102 MMHG | SYSTOLIC BLOOD PRESSURE: 156 MMHG | OXYGEN SATURATION: 94 % | HEART RATE: 59 BPM | RESPIRATION RATE: 16 BRPM | TEMPERATURE: 97.7 F

## 2025-07-01 DIAGNOSIS — K40.91 RECURRENT INGUINAL HERNIA WITHOUT OBSTRUCTION OR GANGRENE, UNSPECIFIED LATERALITY: ICD-10-CM

## 2025-07-01 DIAGNOSIS — R10.84 GENERALIZED ABDOMINAL PAIN: Primary | ICD-10-CM

## 2025-07-01 LAB
ALBUMIN SERPL-MCNC: 3.8 G/DL (ref 3.5–5)
ALBUMIN/GLOB SERPL: 1 (ref 1.1–2.2)
ALP SERPL-CCNC: 99 U/L (ref 45–117)
ALT SERPL-CCNC: 33 U/L (ref 12–78)
ANION GAP SERPL CALC-SCNC: 6 MMOL/L (ref 2–12)
AST SERPL-CCNC: 23 U/L (ref 15–37)
BASOPHILS # BLD: 0.1 K/UL (ref 0–0.1)
BASOPHILS NFR BLD: 1.2 % (ref 0–1)
BILIRUB SERPL-MCNC: 0.5 MG/DL (ref 0.2–1)
BUN SERPL-MCNC: 18 MG/DL (ref 6–20)
BUN/CREAT SERPL: 12 (ref 12–20)
CALCIUM SERPL-MCNC: 9.3 MG/DL (ref 8.5–10.1)
CHLORIDE SERPL-SCNC: 108 MMOL/L (ref 97–108)
CO2 SERPL-SCNC: 27 MMOL/L (ref 21–32)
CREAT SERPL-MCNC: 1.5 MG/DL (ref 0.7–1.3)
DIFFERENTIAL METHOD BLD: ABNORMAL
EOSINOPHIL # BLD: 0.2 K/UL (ref 0–0.4)
EOSINOPHIL NFR BLD: 2.3 % (ref 0–7)
ERYTHROCYTE [DISTWIDTH] IN BLOOD BY AUTOMATED COUNT: 13 % (ref 11.5–14.5)
GLOBULIN SER CALC-MCNC: 3.8 G/DL (ref 2–4)
GLUCOSE SERPL-MCNC: 130 MG/DL (ref 65–100)
HCT VFR BLD AUTO: 44.9 % (ref 36.6–50.3)
HGB BLD-MCNC: 15.4 G/DL (ref 12.1–17)
IMM GRANULOCYTES # BLD AUTO: 0.03 K/UL (ref 0–0.04)
IMM GRANULOCYTES NFR BLD AUTO: 0.3 % (ref 0–0.5)
LIPASE SERPL-CCNC: 25 U/L (ref 13–75)
LYMPHOCYTES # BLD: 1.83 K/UL (ref 0.8–3.5)
LYMPHOCYTES NFR BLD: 21.1 % (ref 12–49)
MCH RBC QN AUTO: 30.5 PG (ref 26–34)
MCHC RBC AUTO-ENTMCNC: 34.3 G/DL (ref 30–36.5)
MCV RBC AUTO: 88.9 FL (ref 80–99)
MONOCYTES # BLD: 0.87 K/UL (ref 0–1)
MONOCYTES NFR BLD: 10 % (ref 5–13)
NEUTS SEG # BLD: 5.63 K/UL (ref 1.8–8)
NEUTS SEG NFR BLD: 65.1 % (ref 32–75)
NRBC # BLD: 0 K/UL (ref 0–0.01)
NRBC BLD-RTO: 0 PER 100 WBC
PLATELET # BLD AUTO: 288 K/UL (ref 150–400)
PMV BLD AUTO: 10 FL (ref 8.9–12.9)
POTASSIUM SERPL-SCNC: 3.5 MMOL/L (ref 3.5–5.1)
PROT SERPL-MCNC: 7.6 G/DL (ref 6.4–8.2)
RBC # BLD AUTO: 5.05 M/UL (ref 4.1–5.7)
SODIUM SERPL-SCNC: 141 MMOL/L (ref 136–145)
WBC # BLD AUTO: 8.7 K/UL (ref 4.1–11.1)

## 2025-07-01 PROCEDURE — 99285 EMERGENCY DEPT VISIT HI MDM: CPT

## 2025-07-01 PROCEDURE — 83690 ASSAY OF LIPASE: CPT

## 2025-07-01 PROCEDURE — 74177 CT ABD & PELVIS W/CONTRAST: CPT

## 2025-07-01 PROCEDURE — 85025 COMPLETE CBC W/AUTO DIFF WBC: CPT

## 2025-07-01 PROCEDURE — 80053 COMPREHEN METABOLIC PANEL: CPT

## 2025-07-01 PROCEDURE — 6360000002 HC RX W HCPCS: Performed by: STUDENT IN AN ORGANIZED HEALTH CARE EDUCATION/TRAINING PROGRAM

## 2025-07-01 PROCEDURE — 6360000004 HC RX CONTRAST MEDICATION: Performed by: STUDENT IN AN ORGANIZED HEALTH CARE EDUCATION/TRAINING PROGRAM

## 2025-07-01 PROCEDURE — 36415 COLL VENOUS BLD VENIPUNCTURE: CPT

## 2025-07-01 PROCEDURE — 96374 THER/PROPH/DIAG INJ IV PUSH: CPT

## 2025-07-01 RX ORDER — KETOROLAC TROMETHAMINE 30 MG/ML
15 INJECTION, SOLUTION INTRAMUSCULAR; INTRAVENOUS
Status: COMPLETED | OUTPATIENT
Start: 2025-07-01 | End: 2025-07-01

## 2025-07-01 RX ORDER — IBUPROFEN 600 MG/1
600 TABLET, FILM COATED ORAL EVERY 6 HOURS PRN
Qty: 30 TABLET | Refills: 0 | Status: SHIPPED | OUTPATIENT
Start: 2025-07-01

## 2025-07-01 RX ORDER — IOPAMIDOL 755 MG/ML
100 INJECTION, SOLUTION INTRAVASCULAR
Status: COMPLETED | OUTPATIENT
Start: 2025-07-01 | End: 2025-07-01

## 2025-07-01 RX ORDER — ACETAMINOPHEN 500 MG
1000 TABLET ORAL EVERY 6 HOURS PRN
Qty: 60 TABLET | Refills: 0 | Status: SHIPPED | OUTPATIENT
Start: 2025-07-01

## 2025-07-01 RX ORDER — FAMOTIDINE 20 MG/1
20 TABLET, FILM COATED ORAL 2 TIMES DAILY PRN
Qty: 60 TABLET | Refills: 0 | Status: SHIPPED | OUTPATIENT
Start: 2025-07-01 | End: 2025-07-31

## 2025-07-01 RX ADMIN — IOPAMIDOL 100 ML: 755 INJECTION, SOLUTION INTRAVENOUS at 19:35

## 2025-07-01 RX ADMIN — KETOROLAC TROMETHAMINE 15 MG: 30 INJECTION, SOLUTION INTRAMUSCULAR at 18:13

## 2025-07-01 ASSESSMENT — PAIN SCALES - GENERAL
PAINLEVEL_OUTOF10: 0
PAINLEVEL_OUTOF10: 6
PAINLEVEL_OUTOF10: 0

## 2025-07-01 ASSESSMENT — PAIN DESCRIPTION - LOCATION
LOCATION: GROIN
LOCATION: GROIN

## 2025-07-01 NOTE — ED PROVIDER NOTES
platelets, and white blood cells    Imaging studies reviewed and interpreted independently by ER physician: CT scan shows no hernia, no obstruction no strangulation.  Patient made aware of all nonemergent findings    Independent historian: EMS provides history of patient's symptoms and no medications given prior to arrival    New consult: None    Risk: Prescription medication management, Prescription IV medications given in the ER, New serious or life-threatening medical condition diagnosed in the ER, Acute on chronic exacerbation of medical condition, and Considered admission or transfer to higher level of care given patient's medical condition: Evaluation for hernia and strangulation of hernia    Cardiac Monitor: Cardiac Monitor:  Rate: 61 bpm  The cardiac monitor revealed the following rhythm as interpreted by me: Normal Sinus Rhythm Cardiac and pulse ox monitoring were ordered to monitor patient for signs of cardiac dysrhythmia, which they are at risk for based on their history and/or risk for cardiovascular disease and/or metabolic abnormalities.  Pulse ox monitoring: normal oxygenation, reading 99   Independently interpreted by ER physician      Social Determinants affecting Treatment Plan: Patient lacks a Primary care Physician. Patient has been given PCP/Free clinic resources and additional time has been spent discussing a plan to follow up, Patient is homeless/has housing insecurity.  Given Housing Resources, and Patient lacks transportation. Discussed transportation assistance options and programs., Patient has a poor medical literacy and understanding. Additional time provided and explanation of patient's diagnosis and treatment plan, Patient has a mental health illness that is poorly controlled.  Provided resources and additional time provided in explanation of their treatment plan., Patient lacks transportation. Time spent with patient discussing free and low cost transportation, and These social

## 2025-07-02 NOTE — DISCHARGE INSTRUCTIONS
Your blood work and CT scan were normal today.  You had no evidence of hernia on the CT scan.  But sometimes you have hernias that resolve on their own.  Please follow-up with a general surgeon and a GI doctor for further evaluation and treatment.  If you have recurrent pain you may have to have another abdominal surgery for evaluation of hernia and repair of any mesh.  He also noticed to have some gastritis on the CT scan and fatty liver disease.  Please take the following GI medicine, famotidine, for the gastritis and follow-up with a GI doctor for both of these issues.    All results included below        Thank you for choosing our Emergency Department for your care.  It is our privilege to care for you in your time of need.  In the next several days, you may receive a survey via email or mailed to your home about your experience with our team.  We would greatly appreciate you taking a few minutes to complete the survey, as we use this information to learn what we have done well and what we could be doing better. Thank you for trusting us with your care!    Below you will find a list of your tests from today's visit.   Labs and Radiology Studies  Recent Results (from the past 12 hours)   CBC with Auto Differential    Collection Time: 07/01/25  5:53 PM   Result Value Ref Range    WBC 8.7 4.1 - 11.1 K/uL    RBC 5.05 4.10 - 5.70 M/uL    Hemoglobin 15.4 12.1 - 17.0 g/dL    Hematocrit 44.9 36.6 - 50.3 %    MCV 88.9 80.0 - 99.0 FL    MCH 30.5 26.0 - 34.0 PG    MCHC 34.3 30.0 - 36.5 g/dL    RDW 13.0 11.5 - 14.5 %    Platelets 288 150 - 400 K/uL    MPV 10.0 8.9 - 12.9 FL    Nucleated RBCs 0.0 0  WBC    nRBC 0.00 0.00 - 0.01 K/uL    Neutrophils % 65.1 32.0 - 75.0 %    Lymphocytes % 21.1 12.0 - 49.0 %    Monocytes % 10.0 5.0 - 13.0 %    Eosinophils % 2.3 0.0 - 7.0 %    Basophils % 1.2 (H) 0.0 - 1.0 %    Immature Granulocytes % 0.3 0.0 - 0.5 %    Neutrophils Absolute 5.63 1.80 - 8.00 K/UL    Lymphocytes Absolute 1.83

## 2025-07-02 NOTE — ED NOTES
Patient denies pain at this time, resting in stretcher with eyes closed, states he will need an uber when he is discharged, provided ginger ale at this time and callbell in reach.

## (undated) LAB
ALBUMIN SERPL-MCNC: 3.6 G/DL (ref 3.5–5)
ALBUMIN/GLOB SERPL: 1.1 (ref 1.1–2.2)
ALP SERPL-CCNC: 71 U/L (ref 45–117)
ALT SERPL-CCNC: 47 U/L (ref 12–78)
ANION GAP SERPL CALC-SCNC: 9 MMOL/L (ref 5–15)
AST SERPL-CCNC: 33 U/L (ref 15–37)
ATRIAL RATE: 79 BPM
BASOPHILS # BLD: 0.1 K/UL (ref 0–0.1)
BASOPHILS NFR BLD: 1 % (ref 0–1)
BILIRUB SERPL-MCNC: 0.4 MG/DL (ref 0.2–1)
BUN SERPL-MCNC: 13 MG/DL (ref 6–20)
BUN/CREAT SERPL: 10 (ref 12–20)
CALCIUM SERPL-MCNC: 9 MG/DL (ref 8.5–10.1)
CALCULATED P AXIS, ECG09: 32 DEGREES
CALCULATED R AXIS, ECG10: -52 DEGREES
CALCULATED T AXIS, ECG11: 19 DEGREES
CHLORIDE SERPL-SCNC: 103 MMOL/L (ref 97–108)
CO2 SERPL-SCNC: 30 MMOL/L (ref 21–32)
CREAT SERPL-MCNC: 1.36 MG/DL (ref 0.7–1.3)
DIAGNOSIS, 93000: (no result)
DIFFERENTIAL METHOD BLD: (no result)
EOSINOPHIL # BLD: 0.2 K/UL (ref 0–0.4)
EOSINOPHIL NFR BLD: 2 % (ref 0–7)
ERYTHROCYTE [DISTWIDTH] IN BLOOD BY AUTOMATED COUNT: 13 % (ref 11.5–14.5)
GLOBULIN SER CALC-MCNC: 3.3 G/DL (ref 2–4)
GLUCOSE SERPL-MCNC: 89 MG/DL (ref 65–100)
HCT VFR BLD AUTO: 42.5 % (ref 36.6–50.3)
HGB BLD-MCNC: 15.2 G/DL (ref 12.1–17)
IMM GRANULOCYTES # BLD AUTO: 0 K/UL (ref 0–0.04)
IMM GRANULOCYTES NFR BLD AUTO: 0 % (ref 0–0.5)
LYMPHOCYTES # BLD: 2.1 K/UL (ref 0.8–3.5)
LYMPHOCYTES NFR BLD: 24 % (ref 12–49)
MCH RBC QN AUTO: 31.1 PG (ref 26–34)
MCHC RBC AUTO-ENTMCNC: 35.8 G/DL (ref 30–36.5)
MCV RBC AUTO: 87.1 FL (ref 80–99)
MONOCYTES # BLD: 0.8 K/UL (ref 0–1)
MONOCYTES NFR BLD: 9 % (ref 5–13)
NEUTS SEG # BLD: 5.6 K/UL (ref 1.8–8)
NEUTS SEG NFR BLD: 64 % (ref 32–75)
NRBC # BLD: 0 K/UL (ref 0–0.01)
NRBC BLD-RTO: 0 PER 100 WBC
P-R INTERVAL, ECG05: 144 MS
PLATELET # BLD AUTO: 290 K/UL (ref 150–400)
PMV BLD AUTO: 9.2 FL (ref 8.9–12.9)
POTASSIUM SERPL-SCNC: 3 MMOL/L (ref 3.5–5.1)
PROT SERPL-MCNC: 6.9 G/DL (ref 6.4–8.2)
Q-T INTERVAL, ECG07: 396 MS
QRS DURATION, ECG06: 90 MS
QTC CALCULATION (BEZET), ECG08: 454 MS
RBC # BLD AUTO: 4.88 M/UL (ref 4.1–5.7)
SODIUM SERPL-SCNC: 142 MMOL/L (ref 136–145)
TROPONIN I SERPL HS-MCNC: 12 NG/L (ref 0–76)
TROPONIN I SERPL HS-MCNC: 13 NG/L (ref 0–76)
VENTRICULAR RATE, ECG03: 79 BPM
WBC # BLD AUTO: 8.8 K/UL (ref 4.1–11.1)